# Patient Record
Sex: FEMALE | Race: WHITE | Employment: FULL TIME | URBAN - METROPOLITAN AREA
[De-identification: names, ages, dates, MRNs, and addresses within clinical notes are randomized per-mention and may not be internally consistent; named-entity substitution may affect disease eponyms.]

---

## 2017-03-01 DIAGNOSIS — R60.9 EDEMA, UNSPECIFIED TYPE: ICD-10-CM

## 2017-03-01 DIAGNOSIS — F41.1 GAD (GENERALIZED ANXIETY DISORDER): ICD-10-CM

## 2017-03-01 DIAGNOSIS — I10 ESSENTIAL HYPERTENSION: ICD-10-CM

## 2017-03-01 RX ORDER — HYDROCHLOROTHIAZIDE 25 MG/1
TABLET ORAL
Qty: 90 TABLET | Refills: 0 | Status: SHIPPED | OUTPATIENT
Start: 2017-03-01 | End: 2017-06-06 | Stop reason: SDUPTHER

## 2017-03-01 RX ORDER — ALPRAZOLAM 1 MG/1
TABLET ORAL
Qty: 30 TABLET | Refills: 0 | Status: SHIPPED | OUTPATIENT
Start: 2017-03-01 | End: 2017-04-03 | Stop reason: SDUPTHER

## 2017-03-02 ENCOUNTER — NURSE ONLY (OUTPATIENT)
Dept: INTERNAL MEDICINE CLINIC | Age: 47
End: 2017-03-02

## 2017-03-02 ENCOUNTER — HOSPITAL ENCOUNTER (OUTPATIENT)
Dept: MAMMOGRAPHY | Age: 47
Discharge: HOME | End: 2017-03-02
Attending: FAMILY MEDICINE | Admitting: FAMILY MEDICINE

## 2017-03-02 DIAGNOSIS — E53.8 B12 DEFICIENCY: Primary | ICD-10-CM

## 2017-03-02 DIAGNOSIS — Z12.31 ENCOUNTER FOR SCREENING MAMMOGRAM FOR MALIGNANT NEOPLASM OF BREAST: ICD-10-CM

## 2017-03-02 PROCEDURE — 96372 THER/PROPH/DIAG INJ SC/IM: CPT | Performed by: FAMILY MEDICINE

## 2017-03-02 RX ORDER — CYANOCOBALAMIN 1000 UG/ML
1000 INJECTION INTRAMUSCULAR; SUBCUTANEOUS ONCE
Status: COMPLETED | OUTPATIENT
Start: 2017-03-02 | End: 2017-03-02

## 2017-03-02 RX ADMIN — CYANOCOBALAMIN 1000 MCG: 1000 INJECTION INTRAMUSCULAR; SUBCUTANEOUS at 12:41

## 2017-03-06 ENCOUNTER — TELEPHONE (OUTPATIENT)
Dept: INTERNAL MEDICINE CLINIC | Age: 47
End: 2017-03-06

## 2017-03-06 DIAGNOSIS — F41.1 GAD (GENERALIZED ANXIETY DISORDER): ICD-10-CM

## 2017-03-15 RX ORDER — SERTRALINE HYDROCHLORIDE 25 MG/1
TABLET, FILM COATED ORAL
Qty: 90 TABLET | Refills: 0 | Status: SHIPPED | OUTPATIENT
Start: 2017-03-15 | End: 2017-04-12 | Stop reason: SDUPTHER

## 2017-04-03 DIAGNOSIS — F41.1 GAD (GENERALIZED ANXIETY DISORDER): ICD-10-CM

## 2017-04-03 RX ORDER — ALPRAZOLAM 1 MG/1
TABLET ORAL
Qty: 30 TABLET | Refills: 0 | Status: SHIPPED | OUTPATIENT
Start: 2017-04-03 | End: 2017-04-24 | Stop reason: SDUPTHER

## 2017-04-06 ENCOUNTER — OFFICE VISIT (OUTPATIENT)
Dept: INTERNAL MEDICINE CLINIC | Age: 47
End: 2017-04-06

## 2017-04-06 VITALS
BODY MASS INDEX: 40.8 KG/M2 | SYSTOLIC BLOOD PRESSURE: 116 MMHG | HEIGHT: 64 IN | DIASTOLIC BLOOD PRESSURE: 80 MMHG | WEIGHT: 239 LBS | HEART RATE: 78 BPM | TEMPERATURE: 98.8 F

## 2017-04-06 DIAGNOSIS — E78.2 MIXED HYPERLIPIDEMIA: ICD-10-CM

## 2017-04-06 DIAGNOSIS — E53.8 B12 DEFICIENCY: ICD-10-CM

## 2017-04-06 DIAGNOSIS — I10 ESSENTIAL HYPERTENSION: Primary | ICD-10-CM

## 2017-04-06 DIAGNOSIS — F41.1 GAD (GENERALIZED ANXIETY DISORDER): ICD-10-CM

## 2017-04-06 PROCEDURE — 36415 COLL VENOUS BLD VENIPUNCTURE: CPT | Performed by: FAMILY MEDICINE

## 2017-04-06 PROCEDURE — 96372 THER/PROPH/DIAG INJ SC/IM: CPT | Performed by: FAMILY MEDICINE

## 2017-04-06 PROCEDURE — 99214 OFFICE O/P EST MOD 30 MIN: CPT | Performed by: FAMILY MEDICINE

## 2017-04-06 RX ORDER — CYANOCOBALAMIN 1000 UG/ML
1000 INJECTION INTRAMUSCULAR; SUBCUTANEOUS ONCE
Status: COMPLETED | OUTPATIENT
Start: 2017-04-06 | End: 2017-04-06

## 2017-04-06 RX ADMIN — CYANOCOBALAMIN 1000 MCG: 1000 INJECTION INTRAMUSCULAR; SUBCUTANEOUS at 09:56

## 2017-04-11 DIAGNOSIS — E78.2 MIXED HYPERLIPIDEMIA: Primary | ICD-10-CM

## 2017-04-11 LAB
CHOLESTEROL, TOTAL: 261 MG/DL (ref 100–199)
HDL PARTICLE NO, NMR: 37.3 UMOL/L
HDL SIZE: 9.2 NM
HDLC SERPL-MCNC: 73 MG/DL
LARGE HDL PARTICLE, NMR: 9 UMOL/L
LARGE VLDL PARTICLE, NMR: 13 NMOL/L
LDL CHOLESTEROL: 141 MG/DL (ref 0–99)
LDL PARTICLE NUMBER, NMR: 1450 NMOL/L
LDL PARTICLE SIZE: 22 NM
LDL SIZE: 22 NM
LP INSULIN RESIST SCORE: 45
SMALL LDL PARTICLE, NMR: 369 NMOL/L
SMALL LDL-P: 369 NMOL/L
TRIGL SERPL-MCNC: 233 MG/DL (ref 0–149)
VLDL SIZE: 50.1 NM

## 2017-04-11 RX ORDER — ATORVASTATIN CALCIUM 20 MG/1
20 TABLET, FILM COATED ORAL DAILY
Qty: 30 TABLET | Refills: 5 | Status: SHIPPED | OUTPATIENT
Start: 2017-04-11 | End: 2017-08-22

## 2017-04-12 RX ORDER — SERTRALINE HYDROCHLORIDE 25 MG/1
TABLET, FILM COATED ORAL
Qty: 90 TABLET | Refills: 0 | Status: SHIPPED | OUTPATIENT
Start: 2017-04-12 | End: 2017-05-15 | Stop reason: SDUPTHER

## 2017-04-24 DIAGNOSIS — F41.1 GAD (GENERALIZED ANXIETY DISORDER): ICD-10-CM

## 2017-04-25 RX ORDER — ALPRAZOLAM 1 MG/1
TABLET ORAL
Qty: 30 TABLET | Refills: 2 | Status: SHIPPED | OUTPATIENT
Start: 2017-04-25 | End: 2017-07-21 | Stop reason: SDUPTHER

## 2017-05-11 RX ORDER — FLUCONAZOLE 150 MG/1
150 TABLET ORAL ONCE
Qty: 1 TABLET | Refills: 0 | Status: SHIPPED | OUTPATIENT
Start: 2017-05-11 | End: 2017-05-11

## 2017-05-15 RX ORDER — SERTRALINE HYDROCHLORIDE 25 MG/1
TABLET, FILM COATED ORAL
Qty: 90 TABLET | Refills: 0 | Status: SHIPPED | OUTPATIENT
Start: 2017-05-15 | End: 2017-06-15 | Stop reason: SDUPTHER

## 2017-05-18 ENCOUNTER — NURSE ONLY (OUTPATIENT)
Dept: INTERNAL MEDICINE CLINIC | Age: 47
End: 2017-05-18

## 2017-05-18 DIAGNOSIS — E53.8 B12 DEFICIENCY: Primary | ICD-10-CM

## 2017-05-18 PROCEDURE — 96372 THER/PROPH/DIAG INJ SC/IM: CPT | Performed by: FAMILY MEDICINE

## 2017-05-18 RX ADMIN — CYANOCOBALAMIN 1000 MCG: 1000 INJECTION INTRAMUSCULAR; SUBCUTANEOUS at 11:46

## 2017-05-19 ENCOUNTER — PATIENT MESSAGE (OUTPATIENT)
Dept: INTERNAL MEDICINE CLINIC | Age: 47
End: 2017-05-19

## 2017-06-14 RX ORDER — METFORMIN HYDROCHLORIDE 500 MG/1
TABLET, EXTENDED RELEASE ORAL
Qty: 120 TABLET | Refills: 5 | OUTPATIENT
Start: 2017-06-14

## 2017-07-12 RX ORDER — METFORMIN HYDROCHLORIDE 500 MG/1
2000 TABLET, EXTENDED RELEASE ORAL
Qty: 120 TABLET | Refills: 1 | Status: SHIPPED | OUTPATIENT
Start: 2017-07-12 | End: 2017-09-06 | Stop reason: SDUPTHER

## 2017-07-21 DIAGNOSIS — F41.1 GAD (GENERALIZED ANXIETY DISORDER): ICD-10-CM

## 2017-07-24 RX ORDER — ALPRAZOLAM 1 MG/1
TABLET ORAL
Qty: 30 TABLET | Refills: 0 | Status: SHIPPED | OUTPATIENT
Start: 2017-07-24 | End: 2017-08-22 | Stop reason: SDUPTHER

## 2017-08-15 ENCOUNTER — OFFICE VISIT (OUTPATIENT)
Dept: INTERNAL MEDICINE CLINIC | Age: 47
End: 2017-08-15

## 2017-08-15 VITALS
DIASTOLIC BLOOD PRESSURE: 78 MMHG | WEIGHT: 238.4 LBS | SYSTOLIC BLOOD PRESSURE: 122 MMHG | HEART RATE: 79 BPM | BODY MASS INDEX: 42.24 KG/M2 | TEMPERATURE: 98.1 F | HEIGHT: 63 IN

## 2017-08-15 DIAGNOSIS — E78.2 MIXED HYPERLIPIDEMIA: ICD-10-CM

## 2017-08-15 DIAGNOSIS — E53.8 B12 DEFICIENCY: ICD-10-CM

## 2017-08-15 DIAGNOSIS — Z23 NEED FOR TDAP VACCINATION: ICD-10-CM

## 2017-08-15 DIAGNOSIS — F41.1 GAD (GENERALIZED ANXIETY DISORDER): ICD-10-CM

## 2017-08-15 DIAGNOSIS — E55.9 VITAMIN D DEFICIENCY: ICD-10-CM

## 2017-08-15 DIAGNOSIS — I10 ESSENTIAL HYPERTENSION: ICD-10-CM

## 2017-08-15 DIAGNOSIS — Z00.00 ROUTINE GENERAL MEDICAL EXAMINATION AT A HEALTH CARE FACILITY: Primary | ICD-10-CM

## 2017-08-15 DIAGNOSIS — R53.83 FATIGUE, UNSPECIFIED TYPE: ICD-10-CM

## 2017-08-15 LAB
A/G RATIO: 1.5 (ref 1.1–2.2)
ALBUMIN SERPL-MCNC: 4.2 G/DL (ref 3.4–5)
ALP BLD-CCNC: 94 U/L (ref 40–129)
ALT SERPL-CCNC: 16 U/L (ref 10–40)
ANION GAP SERPL CALCULATED.3IONS-SCNC: 19 MMOL/L (ref 3–16)
AST SERPL-CCNC: 21 U/L (ref 15–37)
BASOPHILS ABSOLUTE: 0.1 K/UL (ref 0–0.2)
BASOPHILS RELATIVE PERCENT: 0.7 %
BILIRUB SERPL-MCNC: 0.3 MG/DL (ref 0–1)
BUN BLDV-MCNC: 15 MG/DL (ref 7–20)
CALCIUM SERPL-MCNC: 9.6 MG/DL (ref 8.3–10.6)
CHLORIDE BLD-SCNC: 99 MMOL/L (ref 99–110)
CO2: 24 MMOL/L (ref 21–32)
CREAT SERPL-MCNC: <0.5 MG/DL (ref 0.6–1.1)
CREATININE URINE: 78.5 MG/DL (ref 28–259)
EOSINOPHILS ABSOLUTE: 0.1 K/UL (ref 0–0.6)
EOSINOPHILS RELATIVE PERCENT: 1.9 %
FOLATE: 8.48 NG/ML (ref 4.78–24.2)
GFR AFRICAN AMERICAN: >60
GFR NON-AFRICAN AMERICAN: >60
GLOBULIN: 2.8 G/DL
GLUCOSE BLD-MCNC: 185 MG/DL (ref 70–99)
HCT VFR BLD CALC: 42.8 % (ref 36–48)
HEMOGLOBIN: 14 G/DL (ref 12–16)
LYMPHOCYTES ABSOLUTE: 2.4 K/UL (ref 1–5.1)
LYMPHOCYTES RELATIVE PERCENT: 31.1 %
MCH RBC QN AUTO: 28.8 PG (ref 26–34)
MCHC RBC AUTO-ENTMCNC: 32.7 G/DL (ref 31–36)
MCV RBC AUTO: 88 FL (ref 80–100)
MICROALBUMIN UR-MCNC: <1.2 MG/DL
MICROALBUMIN/CREAT UR-RTO: NORMAL MG/G (ref 0–30)
MONOCYTES ABSOLUTE: 0.5 K/UL (ref 0–1.3)
MONOCYTES RELATIVE PERCENT: 6.8 %
NEUTROPHILS ABSOLUTE: 4.5 K/UL (ref 1.7–7.7)
NEUTROPHILS RELATIVE PERCENT: 59.5 %
PDW BLD-RTO: 14.1 % (ref 12.4–15.4)
PLATELET # BLD: 320 K/UL (ref 135–450)
PMV BLD AUTO: 7.9 FL (ref 5–10.5)
POTASSIUM SERPL-SCNC: 4.3 MMOL/L (ref 3.5–5.1)
RBC # BLD: 4.86 M/UL (ref 4–5.2)
SODIUM BLD-SCNC: 142 MMOL/L (ref 136–145)
TOTAL PROTEIN: 7 G/DL (ref 6.4–8.2)
TSH REFLEX: 3.37 UIU/ML (ref 0.27–4.2)
VITAMIN B-12: 491 PG/ML (ref 211–911)
VITAMIN D 25-HYDROXY: 30 NG/ML
WBC # BLD: 7.6 K/UL (ref 4–11)

## 2017-08-15 PROCEDURE — 36415 COLL VENOUS BLD VENIPUNCTURE: CPT | Performed by: FAMILY MEDICINE

## 2017-08-15 PROCEDURE — 96372 THER/PROPH/DIAG INJ SC/IM: CPT | Performed by: FAMILY MEDICINE

## 2017-08-15 PROCEDURE — 99396 PREV VISIT EST AGE 40-64: CPT | Performed by: FAMILY MEDICINE

## 2017-08-15 PROCEDURE — 99214 OFFICE O/P EST MOD 30 MIN: CPT | Performed by: FAMILY MEDICINE

## 2017-08-15 PROCEDURE — 90715 TDAP VACCINE 7 YRS/> IM: CPT | Performed by: FAMILY MEDICINE

## 2017-08-15 PROCEDURE — 90471 IMMUNIZATION ADMIN: CPT | Performed by: FAMILY MEDICINE

## 2017-08-15 RX ORDER — CYANOCOBALAMIN 1000 UG/ML
1000 INJECTION INTRAMUSCULAR; SUBCUTANEOUS ONCE
Status: COMPLETED | OUTPATIENT
Start: 2017-08-15 | End: 2017-08-15

## 2017-08-15 RX ADMIN — CYANOCOBALAMIN 1000 MCG: 1000 INJECTION INTRAMUSCULAR; SUBCUTANEOUS at 09:43

## 2017-08-16 LAB
ESTIMATED AVERAGE GLUCOSE: 203 MG/DL
HBA1C MFR BLD: 8.7 %

## 2017-08-18 LAB
CHOLESTEROL, TOTAL: 263 MG/DL (ref 100–199)
HDL PARTICLE NO, NMR: 40.5 UMOL/L
HDL SIZE: 9.3 NM
HDLC SERPL-MCNC: 73 MG/DL
LARGE HDL PARTICLE, NMR: 8.4 UMOL/L
LARGE VLDL PARTICLE, NMR: 9.5 NMOL/L
LDL CHOLESTEROL: 150 MG/DL (ref 0–99)
LDL PARTICLE NUMBER, NMR: 1880 NMOL/L
LDL PARTICLE SIZE: 21.9 NM
LDL SIZE: 21.9 NM
LP INSULIN RESIST SCORE: 51
SMALL LDL PARTICLE, NMR: 472 NMOL/L
SMALL LDL-P: 472 NMOL/L
TRIGL SERPL-MCNC: 201 MG/DL (ref 0–149)
VLDL SIZE: 54.8 NM

## 2017-08-22 DIAGNOSIS — F41.1 GAD (GENERALIZED ANXIETY DISORDER): ICD-10-CM

## 2017-08-22 RX ORDER — ROSUVASTATIN CALCIUM 10 MG/1
10 TABLET, COATED ORAL DAILY
Qty: 30 TABLET | Refills: 1 | Status: SHIPPED | OUTPATIENT
Start: 2017-08-22 | End: 2017-11-11 | Stop reason: SDUPTHER

## 2017-08-22 RX ORDER — ALPRAZOLAM 1 MG/1
TABLET ORAL
Qty: 30 TABLET | Refills: 2 | Status: SHIPPED | OUTPATIENT
Start: 2017-08-22 | End: 2017-11-14 | Stop reason: SDUPTHER

## 2017-09-06 RX ORDER — METFORMIN HYDROCHLORIDE 500 MG/1
TABLET, EXTENDED RELEASE ORAL
Qty: 120 TABLET | Refills: 1 | Status: SHIPPED | OUTPATIENT
Start: 2017-09-06 | End: 2017-10-30 | Stop reason: SDUPTHER

## 2017-10-30 RX ORDER — METFORMIN HYDROCHLORIDE 500 MG/1
TABLET, EXTENDED RELEASE ORAL
Qty: 120 TABLET | Refills: 0 | Status: SHIPPED | OUTPATIENT
Start: 2017-10-30 | End: 2017-11-29 | Stop reason: SDUPTHER

## 2017-10-30 NOTE — TELEPHONE ENCOUNTER
Refill request for metformin medication.      Name of Pharmacy- [facility]    Last visit - 8-15-17     Pending visit - 10-31-17    Last refill -9-6-17    Medication Contract signed -   Last Desiree contreras-     Additional Comments

## 2017-11-07 NOTE — TELEPHONE ENCOUNTER
Refill request for zoloft medication.      Name of Pharmacy- [facility]    Last visit - 10-31-17     Pending visit - 11-9-17    Last refill -8-7-17    Medication Contract signed -   Last Rafael contreras-     Additional Comments

## 2017-11-10 DIAGNOSIS — F41.1 GAD (GENERALIZED ANXIETY DISORDER): ICD-10-CM

## 2017-11-10 RX ORDER — ALPRAZOLAM 1 MG/1
TABLET ORAL
Qty: 30 TABLET | Refills: 2 | Status: CANCELLED | OUTPATIENT
Start: 2017-11-10

## 2017-11-14 DIAGNOSIS — F41.1 GAD (GENERALIZED ANXIETY DISORDER): ICD-10-CM

## 2017-11-14 RX ORDER — SERTRALINE HYDROCHLORIDE 25 MG/1
TABLET, FILM COATED ORAL
Qty: 90 TABLET | Refills: 5 | Status: SHIPPED | OUTPATIENT
Start: 2017-11-14 | End: 2018-06-27 | Stop reason: SDUPTHER

## 2017-11-14 RX ORDER — ALPRAZOLAM 1 MG/1
TABLET ORAL
Qty: 30 TABLET | Refills: 2 | Status: SHIPPED | OUTPATIENT
Start: 2017-11-14 | End: 2018-02-03 | Stop reason: SDUPTHER

## 2017-11-14 NOTE — TELEPHONE ENCOUNTER
Please notify pt: Prescription sent electronically to pharmacy. Controlled Substances Monitoring:     Attestation: The Prescription Monitoring Report for this patient was reviewed today. (Jennifer Jaime MD)  Documentation: No signs of potential drug abuse or diversion identified., Existing medication contract.  (Jennifer Jaime MD)

## 2017-11-27 DIAGNOSIS — I10 ESSENTIAL HYPERTENSION: ICD-10-CM

## 2017-11-27 DIAGNOSIS — R60.9 EDEMA, UNSPECIFIED TYPE: ICD-10-CM

## 2017-11-27 RX ORDER — HYDROCHLOROTHIAZIDE 25 MG/1
TABLET ORAL
Qty: 90 TABLET | Refills: 0 | Status: SHIPPED | OUTPATIENT
Start: 2017-11-27 | End: 2018-01-18 | Stop reason: SDUPTHER

## 2017-11-27 NOTE — TELEPHONE ENCOUNTER
Refill request for hctz medication.      Name of Pharmacy- [facility]    Last visit - 8-15-17     Pending visit - 1-4-18    Last refill -6-6-17    Medication Contract signed -   Liborio contreras-     Additional Comments

## 2017-11-29 RX ORDER — METFORMIN HYDROCHLORIDE 500 MG/1
TABLET, EXTENDED RELEASE ORAL
Qty: 120 TABLET | Refills: 0 | Status: SHIPPED | OUTPATIENT
Start: 2017-11-29 | End: 2017-12-26 | Stop reason: SDUPTHER

## 2017-11-29 NOTE — TELEPHONE ENCOUNTER
Refill request for metformin medication.      Name of Pharmacy- [facility]    Last visit - [phone]     Pending visit - 1-4-18    Last refill -10-30-17    Medication Contract signed -   Liborio contreras-     Additional Comments

## 2018-01-02 NOTE — TELEPHONE ENCOUNTER
From: Radha Nolasco  Sent: 1/2/2018 11:24 AM EST  Subject: Medication Renewal Request    Radha Gaurav would like a refill of the following medications:  insulin detemir (LEVEMIR FLEXTOUCH) 100 UNIT/ML injection pen [DOROTHEA Jolley MD]    Preferred pharmacy: [facility]/PHARMACY [address], [address]    Comment:  Out of levimer. I take 30 units a day.

## 2018-01-04 ENCOUNTER — OFFICE VISIT (OUTPATIENT)
Dept: INTERNAL MEDICINE CLINIC | Age: 48
End: 2018-01-04

## 2018-01-04 VITALS
WEIGHT: 241.4 LBS | HEART RATE: 81 BPM | BODY MASS INDEX: 42.77 KG/M2 | HEIGHT: 63 IN | RESPIRATION RATE: 18 BRPM | OXYGEN SATURATION: 98 % | DIASTOLIC BLOOD PRESSURE: 80 MMHG | TEMPERATURE: 98 F | SYSTOLIC BLOOD PRESSURE: 126 MMHG

## 2018-01-04 DIAGNOSIS — E53.8 B12 DEFICIENCY: ICD-10-CM

## 2018-01-04 DIAGNOSIS — F41.1 GAD (GENERALIZED ANXIETY DISORDER): Primary | ICD-10-CM

## 2018-01-04 PROCEDURE — 96372 THER/PROPH/DIAG INJ SC/IM: CPT | Performed by: FAMILY MEDICINE

## 2018-01-04 PROCEDURE — 99213 OFFICE O/P EST LOW 20 MIN: CPT | Performed by: FAMILY MEDICINE

## 2018-01-04 RX ORDER — CYANOCOBALAMIN 1000 UG/ML
1000 INJECTION INTRAMUSCULAR; SUBCUTANEOUS ONCE
Status: COMPLETED | OUTPATIENT
Start: 2018-01-04 | End: 2018-01-04

## 2018-01-04 RX ADMIN — CYANOCOBALAMIN 1000 MCG: 1000 INJECTION INTRAMUSCULAR; SUBCUTANEOUS at 09:33

## 2018-01-04 NOTE — PATIENT INSTRUCTIONS
Continue current medicines. Return in about 12 weeks (around 3/29/2018) for Fasting recheck DM, BP, lipids, anxiety, CSM.

## 2018-01-04 NOTE — PROGRESS NOTES
Subjective:      Patient ID: Nereyda Calixto is a 50 y.o. female. HPI  Mood Disorder:  Patient presents for follow-up of anxiety disorder. Current complaints include: none. She denies anhedonia, depressed mood, tearfulness, feelings of hopelessness, feelings of worthlessness/excessive guilt, insomnia, irritability, excessive worry, restlessness, panic attacks, increased use of drugs or alcohol and suicidal thoughts or behavior. Symptoms/signs of roshan: none. External stressors: nothing new. Current treatment includes: Zoloft- 75 mg qd and benzodiazepine- Xanax 1 mg qd. Medication side effects: none. B12 Deficiency  Stable. C/O fatigue, but due for her monthly injection. Requests B12 shot today. Review of Systems  Review of Systems - General ROS: positive for  - fatigue  negative for - chills, fever, night sweats, sleep disturbance, weight gain or weight loss  Psychological ROS: negative, anxiety under good control  ENT ROS: negative  Respiratory ROS: no cough, shortness of breath, or wheezing  Cardiovascular ROS: no chest pain or dyspnea on exertion  Gastrointestinal ROS: no abdominal pain, change in bowel habits, or black or bloody stools  Genito-Urinary ROS: no dysuria, trouble voiding, or hematuria  Musculoskeletal ROS: negative  Neurological ROS: no TIA or stroke symptoms  Dermatological ROS: negative      Objective:   Physical Exam  Nursing note and vitals reviewed. Vitals:    01/04/18 0853   BP: 126/80   Pulse: 81   Resp: 18   Temp: 98 °F (36.7 °C)   TempSrc: Oral   SpO2: 98%   Weight: 241 lb 6.4 oz (109.5 kg)   Height: 5' 3\" (1.6 m)     Wt Readings from Last 3 Encounters:   01/04/18 241 lb 6.4 oz (109.5 kg)   08/15/17 238 lb 6.4 oz (108.1 kg)   04/06/17 239 lb (108.4 kg)     BP Readings from Last 3 Encounters:   01/04/18 126/80   08/15/17 122/78   04/06/17 116/80     Body mass index is 42.76 kg/m². Constitutional: Patient appears well-developed and well-nourished. No distress.    Head:

## 2018-01-18 DIAGNOSIS — I10 ESSENTIAL HYPERTENSION: ICD-10-CM

## 2018-01-18 DIAGNOSIS — R60.9 EDEMA, UNSPECIFIED TYPE: ICD-10-CM

## 2018-01-18 RX ORDER — HYDROCHLOROTHIAZIDE 25 MG/1
25 TABLET ORAL DAILY
Qty: 90 TABLET | Refills: 1 | Status: SHIPPED | OUTPATIENT
Start: 2018-01-18 | End: 2018-07-10 | Stop reason: SDUPTHER

## 2018-01-18 RX ORDER — METFORMIN HYDROCHLORIDE 500 MG/1
1000 TABLET, EXTENDED RELEASE ORAL
Qty: 120 TABLET | Refills: 1 | Status: SHIPPED | OUTPATIENT
Start: 2018-01-18 | End: 2018-03-05 | Stop reason: SDUPTHER

## 2018-01-18 RX ORDER — METFORMIN HYDROCHLORIDE 500 MG/1
TABLET, EXTENDED RELEASE ORAL
Qty: 120 TABLET | Refills: 5 | OUTPATIENT
Start: 2018-01-18

## 2018-01-18 NOTE — TELEPHONE ENCOUNTER
From: Cem Marquis  Sent: 1/18/2018 7:54 AM EST  Subject: Medication Renewal Request    Cem Marquis would like a refill of the following medications:  hydrochlorothiazide (HYDRODIURIL) 25 MG tablet Nieves Cordon MD]    Preferred pharmacy: [facility]/PHARMACY [facility], [address] - [address] [phone]    Comment:      Medication renewals requested in this message routed separately:  metFORMIN (GLUCOPHAGE-XR) 500 MG extended release tablet [DOROTHEA SERRANO MD]

## 2018-01-25 NOTE — TELEPHONE ENCOUNTER
From: Fabby Tse  Sent: 1/25/2018 10:58 AM EST  Subject: Medication Renewal Request    Fabby Tse would like a refill of the following medications:  insulin detemir (LEVEMIR FLEXTOUCH) 100 UNIT/ML injection pen Main Simpson CNP]    Preferred pharmacy: [facility]/PHARMACY [id number], [address] [facility]    Comment:  Dr Milagro Sanabria has changed prescription to 30mg per day, so I am running low. Thank you.

## 2018-01-26 ENCOUNTER — E-VISIT (OUTPATIENT)
Dept: INTERNAL MEDICINE CLINIC | Age: 48
End: 2018-01-26

## 2018-01-26 PROCEDURE — 99444 PR PHYSICIAN ONLINE EVALUATION & MANAGEMENT SERVICE: CPT | Performed by: FAMILY MEDICINE

## 2018-01-26 RX ORDER — FLUCONAZOLE 150 MG/1
150 TABLET ORAL ONCE
Qty: 1 TABLET | Refills: 0 | Status: SHIPPED | OUTPATIENT
Start: 2018-01-26 | End: 2018-01-26

## 2018-02-03 DIAGNOSIS — F41.1 GAD (GENERALIZED ANXIETY DISORDER): ICD-10-CM

## 2018-02-06 RX ORDER — ALPRAZOLAM 1 MG/1
.5-1 TABLET ORAL NIGHTLY PRN
Qty: 30 TABLET | Refills: 2 | Status: SHIPPED | OUTPATIENT
Start: 2018-02-08 | End: 2018-04-26 | Stop reason: SDUPTHER

## 2018-02-06 NOTE — TELEPHONE ENCOUNTER
Prescription sent electronically to pharmacy. Controlled Substances Monitoring:     Attestation: The Prescription Monitoring Report for this patient was reviewed today. (Julia Collado MD)  Documentation: No signs of potential drug abuse or diversion identified. (Julia Collado MD)  Medication Contracts: Existing medication contract.  (Julia Collado MD)

## 2018-03-01 ENCOUNTER — NURSE ONLY (OUTPATIENT)
Dept: INTERNAL MEDICINE CLINIC | Age: 48
End: 2018-03-01

## 2018-03-01 DIAGNOSIS — E53.8 B12 DEFICIENCY: Primary | ICD-10-CM

## 2018-03-01 DIAGNOSIS — R53.83 FATIGUE, UNSPECIFIED TYPE: ICD-10-CM

## 2018-03-01 PROCEDURE — 96372 THER/PROPH/DIAG INJ SC/IM: CPT | Performed by: INTERNAL MEDICINE

## 2018-03-01 RX ORDER — CYANOCOBALAMIN 1000 UG/ML
1000 INJECTION INTRAMUSCULAR; SUBCUTANEOUS ONCE
Qty: 1 ML | Refills: 0 | Status: SHIPPED | OUTPATIENT
Start: 2018-03-01 | End: 2018-03-01 | Stop reason: SDUPTHER

## 2018-03-01 RX ORDER — CYANOCOBALAMIN 1000 UG/ML
1000 INJECTION INTRAMUSCULAR; SUBCUTANEOUS ONCE
Status: COMPLETED | OUTPATIENT
Start: 2018-03-01 | End: 2018-03-01

## 2018-03-01 RX ADMIN — CYANOCOBALAMIN 1000 MCG: 1000 INJECTION INTRAMUSCULAR; SUBCUTANEOUS at 13:56

## 2018-03-05 RX ORDER — METFORMIN HYDROCHLORIDE 500 MG/1
1000 TABLET, EXTENDED RELEASE ORAL
Qty: 120 TABLET | Refills: 0 | Status: SHIPPED | OUTPATIENT
Start: 2018-03-05 | End: 2018-03-08 | Stop reason: SDUPTHER

## 2018-03-05 NOTE — TELEPHONE ENCOUNTER
Refill request for test strip / metformin  medication.      Name of Pharmacy- [facility]     Last visit - 1-4-18     Pending visit - 4-10-18    Last refill -5-/ 1-18-18    Medication Contract signed -   Last [employer] contreras-     Additional Comments

## 2018-03-07 ENCOUNTER — TELEPHONE (OUTPATIENT)
Dept: INTERNAL MEDICINE CLINIC | Age: 48
End: 2018-03-07

## 2018-03-08 RX ORDER — METFORMIN HYDROCHLORIDE 500 MG/1
2000 TABLET, EXTENDED RELEASE ORAL
Qty: 120 TABLET | Refills: 5 | Status: SHIPPED | OUTPATIENT
Start: 2018-03-08 | End: 2018-08-19 | Stop reason: SDUPTHER

## 2018-03-26 ENCOUNTER — HOSPITAL ENCOUNTER (OUTPATIENT)
Dept: MAMMOGRAPHY | Age: 48
Discharge: HOME | End: 2018-03-26
Attending: FAMILY MEDICINE | Admitting: FAMILY MEDICINE

## 2018-03-26 DIAGNOSIS — Z12.31 ENCOUNTER FOR SCREENING MAMMOGRAM FOR BREAST CANCER: ICD-10-CM

## 2018-04-26 ENCOUNTER — OFFICE VISIT (OUTPATIENT)
Dept: INTERNAL MEDICINE CLINIC | Age: 48
End: 2018-04-26

## 2018-04-26 VITALS
OXYGEN SATURATION: 98 % | BODY MASS INDEX: 43.52 KG/M2 | HEART RATE: 78 BPM | DIASTOLIC BLOOD PRESSURE: 84 MMHG | HEIGHT: 63 IN | WEIGHT: 245.6 LBS | SYSTOLIC BLOOD PRESSURE: 136 MMHG | TEMPERATURE: 98 F

## 2018-04-26 DIAGNOSIS — E78.2 MIXED HYPERLIPIDEMIA: ICD-10-CM

## 2018-04-26 DIAGNOSIS — K80.12 CALCULUS OF GALLBLADDER WITH ACUTE ON CHRONIC CHOLECYSTITIS WITHOUT OBSTRUCTION: ICD-10-CM

## 2018-04-26 DIAGNOSIS — R10.11 RUQ PAIN: ICD-10-CM

## 2018-04-26 DIAGNOSIS — F41.1 GAD (GENERALIZED ANXIETY DISORDER): ICD-10-CM

## 2018-04-26 DIAGNOSIS — I10 ESSENTIAL HYPERTENSION: ICD-10-CM

## 2018-04-26 DIAGNOSIS — R19.7 DIARRHEA, UNSPECIFIED TYPE: ICD-10-CM

## 2018-04-26 DIAGNOSIS — E53.8 B12 DEFICIENCY: ICD-10-CM

## 2018-04-26 PROCEDURE — 99214 OFFICE O/P EST MOD 30 MIN: CPT | Performed by: FAMILY MEDICINE

## 2018-04-26 PROCEDURE — 96372 THER/PROPH/DIAG INJ SC/IM: CPT | Performed by: FAMILY MEDICINE

## 2018-04-26 PROCEDURE — 36415 COLL VENOUS BLD VENIPUNCTURE: CPT | Performed by: FAMILY MEDICINE

## 2018-04-26 RX ORDER — ALPRAZOLAM 1 MG/1
.5-1 TABLET ORAL NIGHTLY PRN
Qty: 30 TABLET | Refills: 2 | Status: SHIPPED | OUTPATIENT
Start: 2018-04-26 | End: 2018-08-08 | Stop reason: SDUPTHER

## 2018-04-26 RX ORDER — CYANOCOBALAMIN 1000 UG/ML
1000 INJECTION INTRAMUSCULAR; SUBCUTANEOUS ONCE
Status: COMPLETED | OUTPATIENT
Start: 2018-04-26 | End: 2018-04-26

## 2018-04-26 RX ADMIN — CYANOCOBALAMIN 1000 MCG: 1000 INJECTION INTRAMUSCULAR; SUBCUTANEOUS at 09:43

## 2018-04-27 LAB
A/G RATIO: 1.8 (ref 1.1–2.2)
ALBUMIN SERPL-MCNC: 5 G/DL (ref 3.4–5)
ALP BLD-CCNC: 97 U/L (ref 40–129)
ALT SERPL-CCNC: 14 U/L (ref 10–40)
ANION GAP SERPL CALCULATED.3IONS-SCNC: 21 MMOL/L (ref 3–16)
AST SERPL-CCNC: 20 U/L (ref 15–37)
BILIRUB SERPL-MCNC: 0.3 MG/DL (ref 0–1)
BUN BLDV-MCNC: 13 MG/DL (ref 7–20)
CALCIUM SERPL-MCNC: 10.5 MG/DL (ref 8.3–10.6)
CHLORIDE BLD-SCNC: 96 MMOL/L (ref 99–110)
CHOLESTEROL, TOTAL: 257 MG/DL (ref 0–199)
CO2: 23 MMOL/L (ref 21–32)
CREAT SERPL-MCNC: 0.5 MG/DL (ref 0.6–1.1)
ESTIMATED AVERAGE GLUCOSE: 200.1 MG/DL
GFR AFRICAN AMERICAN: >60
GFR NON-AFRICAN AMERICAN: >60
GLOBULIN: 2.8 G/DL
GLUCOSE BLD-MCNC: 212 MG/DL (ref 70–99)
HBA1C MFR BLD: 8.6 %
HDLC SERPL-MCNC: 65 MG/DL (ref 40–60)
LDL CHOLESTEROL CALCULATED: 146 MG/DL
POTASSIUM SERPL-SCNC: 4.3 MMOL/L (ref 3.5–5.1)
SODIUM BLD-SCNC: 140 MMOL/L (ref 136–145)
TOTAL PROTEIN: 7.8 G/DL (ref 6.4–8.2)
TRIGL SERPL-MCNC: 229 MG/DL (ref 0–150)
VLDLC SERPL CALC-MCNC: 46 MG/DL

## 2018-05-02 ENCOUNTER — HOSPITAL ENCOUNTER (OUTPATIENT)
Dept: ULTRASOUND IMAGING | Age: 48
Discharge: HOME | End: 2018-05-02
Attending: FAMILY MEDICINE | Admitting: FAMILY MEDICINE

## 2018-05-02 DIAGNOSIS — R10.11 RUQ PAIN: ICD-10-CM

## 2018-05-02 DIAGNOSIS — K80.12 CALCULUS OF GALLBLADDER WITH ACUTE ON CHRONIC CHOLECYSTITIS WITHOUT OBSTRUCTION: ICD-10-CM

## 2018-05-03 RX ORDER — CIPROFLOXACIN 500 MG/1
500 TABLET, FILM COATED ORAL 2 TIMES DAILY
Qty: 6 TABLET | Refills: 0 | Status: SHIPPED | OUTPATIENT
Start: 2018-05-03 | End: 2018-05-06

## 2018-06-27 RX ORDER — SERTRALINE HYDROCHLORIDE 25 MG/1
TABLET, FILM COATED ORAL
Qty: 90 TABLET | Refills: 5 | Status: SHIPPED | OUTPATIENT
Start: 2018-06-27 | End: 2019-03-11 | Stop reason: SDUPTHER

## 2018-07-10 DIAGNOSIS — R60.9 EDEMA, UNSPECIFIED TYPE: ICD-10-CM

## 2018-07-10 DIAGNOSIS — I10 ESSENTIAL HYPERTENSION: ICD-10-CM

## 2018-07-10 RX ORDER — HYDROCHLOROTHIAZIDE 25 MG/1
25 TABLET ORAL DAILY
Qty: 90 TABLET | Refills: 1 | Status: SHIPPED | OUTPATIENT
Start: 2018-07-10 | End: 2019-01-04 | Stop reason: SDUPTHER

## 2018-07-10 NOTE — TELEPHONE ENCOUNTER
Refill request for hctz medication.      Name of Pharmacy- [facility]    Last visit - 4-26-18     Pending visit - 7-17-18    Last refill -1-18-18    Medication Contract signed    Last Manuel contreras-     Additional Comments

## 2018-08-08 DIAGNOSIS — F41.1 GAD (GENERALIZED ANXIETY DISORDER): ICD-10-CM

## 2018-08-09 ENCOUNTER — TELEPHONE (OUTPATIENT)
Dept: INTERNAL MEDICINE CLINIC | Age: 48
End: 2018-08-09

## 2018-08-09 RX ORDER — ALPRAZOLAM 1 MG/1
1 TABLET ORAL NIGHTLY PRN
Qty: 30 TABLET | Refills: 2 | Status: SHIPPED | OUTPATIENT
Start: 2018-08-09 | End: 2018-11-09 | Stop reason: SDUPTHER

## 2018-08-09 NOTE — TELEPHONE ENCOUNTER
Prescription sent electronically to pharmacy. Controlled Substances Monitoring:     RX Monitoring 8/9/2018   Attestation The Prescription Monitoring Report for this patient was reviewed today. Documentation No signs of potential drug abuse or diversion identified. Medication Contracts Existing medication contract.

## 2018-08-09 NOTE — TELEPHONE ENCOUNTER
Refill request for xanax medication.      Name of Pharmacy- [facility]    Last visit - 7/17/18     Pending visit - none    Last refill - 4/26/18    Medication Contract signed - 6/18/15  Last Oarrs ran- 8/9/18

## 2018-08-16 ENCOUNTER — OFFICE VISIT (OUTPATIENT)
Dept: INTERNAL MEDICINE CLINIC | Age: 48
End: 2018-08-16

## 2018-08-16 VITALS
HEART RATE: 85 BPM | SYSTOLIC BLOOD PRESSURE: 118 MMHG | WEIGHT: 232 LBS | DIASTOLIC BLOOD PRESSURE: 64 MMHG | BODY MASS INDEX: 39.61 KG/M2 | OXYGEN SATURATION: 96 % | HEIGHT: 64 IN

## 2018-08-16 DIAGNOSIS — F41.1 GAD (GENERALIZED ANXIETY DISORDER): Primary | ICD-10-CM

## 2018-08-16 DIAGNOSIS — E53.8 B12 DEFICIENCY: ICD-10-CM

## 2018-08-16 PROCEDURE — 99213 OFFICE O/P EST LOW 20 MIN: CPT | Performed by: FAMILY MEDICINE

## 2018-08-16 PROCEDURE — 96372 THER/PROPH/DIAG INJ SC/IM: CPT | Performed by: FAMILY MEDICINE

## 2018-08-16 RX ORDER — CYANOCOBALAMIN 1000 UG/ML
1000 INJECTION INTRAMUSCULAR; SUBCUTANEOUS ONCE
Status: COMPLETED | OUTPATIENT
Start: 2018-08-16 | End: 2018-08-16

## 2018-08-16 RX ADMIN — CYANOCOBALAMIN 1000 MCG: 1000 INJECTION INTRAMUSCULAR; SUBCUTANEOUS at 11:35

## 2018-08-16 ASSESSMENT — ENCOUNTER SYMPTOMS
DIARRHEA: 0
CHEST TIGHTNESS: 0
ABDOMINAL PAIN: 0
BACK PAIN: 0
COUGH: 0
EYE REDNESS: 0
EYE DISCHARGE: 0
CONSTIPATION: 0
SINUS PRESSURE: 0
WHEEZING: 0
SORE THROAT: 0
EYE PAIN: 0
SHORTNESS OF BREATH: 0
VOMITING: 0
NAUSEA: 0
RHINORRHEA: 0
TROUBLE SWALLOWING: 0

## 2018-08-16 ASSESSMENT — PATIENT HEALTH QUESTIONNAIRE - PHQ9
SUM OF ALL RESPONSES TO PHQ QUESTIONS 1-9: 0
2. FEELING DOWN, DEPRESSED OR HOPELESS: 0
1. LITTLE INTEREST OR PLEASURE IN DOING THINGS: 0
SUM OF ALL RESPONSES TO PHQ QUESTIONS 1-9: 0
SUM OF ALL RESPONSES TO PHQ9 QUESTIONS 1 & 2: 0

## 2018-08-16 NOTE — PROGRESS NOTES
SpO2: 96%       Physical Exam  Nursing note and vitals reviewed. Vitals:    08/16/18 1036   BP: 118/64   Site: Left Arm   Position: Sitting   Cuff Size: Medium Adult   Pulse: 85   SpO2: 96%   Weight: 232 lb (105.2 kg)   Height: 5' 4\" (1.626 m)     Wt Readings from Last 3 Encounters:   08/16/18 232 lb (105.2 kg)   04/26/18 245 lb 9.6 oz (111.4 kg)   01/04/18 241 lb 6.4 oz (109.5 kg)     BP Readings from Last 3 Encounters:   08/16/18 118/64   04/26/18 136/84   01/04/18 126/80     Body mass index is 39.82 kg/m². Constitutional: Patient appears well-developed and well-nourished. No distress. Head: Normocephalic and atraumatic. Oropharyngeal exam: mucous membranes moist, pharynx normal without lesions. Neck: Normal range of motion. Neck supple. No thyroidmegaly. Cardiovascular: Normal rate, regular rhythm, normal heart sounds and intact distal pulses. Pulmonary/Chest: Effort normal and breath sounds normal. No stridor. No respiratory distress. No wheezes and no rales. Abdominal: Soft. Bowel sounds are normal. No distension and no mass. No tenderness. No rebound and no guarding. Musculoskeletal: No edema and no tenderness. Skin: No rash or erythema. Psychiatric: Normal mood and affect. Behavior is normal.       Assessment       Diagnosis Orders   1. AMANDA (generalized anxiety disorder)     2. B12 deficiency  cyanocobalamin injection 1,000 mcg            Sarabjit      Debra Drake was seen today for anxiety and other. Diagnoses and all orders for this visit:    AMANDA (generalized anxiety disorder)  Stable, well controlled. Continue current medicines. Controlled Substances Monitoring:     RX Monitoring 8/16/2018   Attestation The Prescription Monitoring Report for this patient was reviewed today. Documentation Possible medication side effects, risk of tolerance/dependence & alternative treatments discussed. ;No signs of potential drug abuse or diversion identified.    Medication Contracts Existing medication

## 2018-08-20 RX ORDER — METFORMIN HYDROCHLORIDE 500 MG/1
2000 TABLET, EXTENDED RELEASE ORAL
Qty: 120 TABLET | Refills: 5 | Status: SHIPPED | OUTPATIENT
Start: 2018-08-20 | End: 2018-12-13 | Stop reason: SDUPTHER

## 2018-08-20 NOTE — TELEPHONE ENCOUNTER
Refill request for metformin medication.      Name of Pharmacy- [facility]    Last visit - 8/16/18     Pending visit - 1/29/19    Last refill -3/8/18  5 refills

## 2018-10-30 ENCOUNTER — APPOINTMENT (OUTPATIENT)
Dept: GENERAL RADIOLOGY | Age: 48
End: 2018-10-30
Payer: COMMERCIAL

## 2018-10-30 ENCOUNTER — HOSPITAL ENCOUNTER (EMERGENCY)
Age: 48
Discharge: HOME | End: 2018-10-31
Attending: EMERGENCY MEDICINE
Payer: COMMERCIAL

## 2018-10-30 DIAGNOSIS — R73.9 HYPERGLYCEMIA: Primary | ICD-10-CM

## 2018-10-30 DIAGNOSIS — N39.0 ACUTE UTI: ICD-10-CM

## 2018-10-30 LAB
A/G RATIO: 1.3 (ref 1.1–2.2)
ALBUMIN SERPL-MCNC: 4.3 G/DL (ref 3.4–5)
ALP BLD-CCNC: 117 U/L (ref 40–129)
ALT SERPL-CCNC: 32 U/L (ref 10–40)
ANION GAP SERPL CALCULATED.3IONS-SCNC: 15 MMOL/L (ref 3–16)
AST SERPL-CCNC: 33 U/L (ref 15–37)
BACTERIA: ABNORMAL /HPF
BASE EXCESS VENOUS: 3.6 MMOL/L (ref -3–3)
BASOPHILS ABSOLUTE: 0.1 K/UL (ref 0–0.2)
BASOPHILS RELATIVE PERCENT: 0.7 %
BILIRUB SERPL-MCNC: 0.4 MG/DL (ref 0–1)
BILIRUBIN URINE: NEGATIVE
BLOOD, URINE: ABNORMAL
BUN BLDV-MCNC: 11 MG/DL (ref 7–20)
CALCIUM SERPL-MCNC: 9.7 MG/DL (ref 8.3–10.6)
CARBOXYHEMOGLOBIN: 1.2 % (ref 0–1.5)
CHLORIDE BLD-SCNC: 90 MMOL/L (ref 99–110)
CLARITY: CLEAR
CO2: 28 MMOL/L (ref 21–32)
COLOR: YELLOW
CREAT SERPL-MCNC: 0.8 MG/DL (ref 0.6–1.1)
EOSINOPHILS ABSOLUTE: 0.1 K/UL (ref 0–0.6)
EOSINOPHILS RELATIVE PERCENT: 0.7 %
EPITHELIAL CELLS, UA: ABNORMAL /HPF
GFR AFRICAN AMERICAN: >60
GFR NON-AFRICAN AMERICAN: >60
GLOBULIN: 3.2 G/DL
GLUCOSE BLD-MCNC: 352 MG/DL (ref 70–99)
GLUCOSE BLD-MCNC: 352 MG/DL (ref 70–99)
GLUCOSE URINE: >=1000 MG/DL
HCO3 VENOUS: 28.5 MMOL/L (ref 23–29)
HCT VFR BLD CALC: 43.6 % (ref 36–48)
HEMOGLOBIN: 14.9 G/DL (ref 12–16)
KETONES, URINE: NEGATIVE MG/DL
LACTIC ACID: 2.1 MMOL/L (ref 0.4–2)
LEUKOCYTE ESTERASE, URINE: NEGATIVE
LIPASE: 32 U/L (ref 13–60)
LYMPHOCYTES ABSOLUTE: 2.2 K/UL (ref 1–5.1)
LYMPHOCYTES RELATIVE PERCENT: 20.1 %
MAGNESIUM: 1.5 MG/DL (ref 1.8–2.4)
MCH RBC QN AUTO: 29.2 PG (ref 26–34)
MCHC RBC AUTO-ENTMCNC: 34.2 G/DL (ref 31–36)
MCV RBC AUTO: 85.4 FL (ref 80–100)
METHEMOGLOBIN VENOUS: 0.4 %
MICROSCOPIC EXAMINATION: YES
MONOCYTES ABSOLUTE: 0.9 K/UL (ref 0–1.3)
MONOCYTES RELATIVE PERCENT: 8.3 %
NEUTROPHILS ABSOLUTE: 7.8 K/UL (ref 1.7–7.7)
NEUTROPHILS RELATIVE PERCENT: 70.2 %
NITRITE, URINE: POSITIVE
O2 CONTENT, VEN: 17 VOL %
O2 SAT, VEN: 78 %
O2 THERAPY: ABNORMAL
PCO2, VEN: 44 MMHG (ref 40–50)
PDW BLD-RTO: 13.1 % (ref 12.4–15.4)
PERFORMED ON: ABNORMAL
PH UA: 6.5
PH VENOUS: 7.43 (ref 7.35–7.45)
PLATELET # BLD: 354 K/UL (ref 135–450)
PMV BLD AUTO: 7.4 FL (ref 5–10.5)
PO2, VEN: 39.6 MMHG (ref 25–40)
POTASSIUM REFLEX MAGNESIUM: 4.1 MMOL/L (ref 3.5–5.1)
PROTEIN UA: NEGATIVE MG/DL
RBC # BLD: 5.11 M/UL (ref 4–5.2)
RBC UA: ABNORMAL /HPF (ref 0–2)
SODIUM BLD-SCNC: 133 MMOL/L (ref 136–145)
SPECIFIC GRAVITY UA: 1.01
TCO2 CALC VENOUS: 30 MMOL/L
TOTAL PROTEIN: 7.5 G/DL (ref 6.4–8.2)
URINE TYPE: ABNORMAL
UROBILINOGEN, URINE: 0.2 E.U./DL
WBC # BLD: 11.1 K/UL (ref 4–11)
WBC UA: ABNORMAL /HPF (ref 0–5)

## 2018-10-30 PROCEDURE — 99285 EMERGENCY DEPT VISIT HI MDM: CPT

## 2018-10-30 PROCEDURE — 93005 ELECTROCARDIOGRAM TRACING: CPT | Performed by: EMERGENCY MEDICINE

## 2018-10-30 PROCEDURE — 6370000000 HC RX 637 (ALT 250 FOR IP): Performed by: EMERGENCY MEDICINE

## 2018-10-30 PROCEDURE — 2580000003 HC RX 258: Performed by: EMERGENCY MEDICINE

## 2018-10-30 PROCEDURE — 96374 THER/PROPH/DIAG INJ IV PUSH: CPT

## 2018-10-30 PROCEDURE — 80053 COMPREHEN METABOLIC PANEL: CPT

## 2018-10-30 PROCEDURE — 6360000002 HC RX W HCPCS: Performed by: EMERGENCY MEDICINE

## 2018-10-30 PROCEDURE — 83690 ASSAY OF LIPASE: CPT

## 2018-10-30 PROCEDURE — 83735 ASSAY OF MAGNESIUM: CPT

## 2018-10-30 PROCEDURE — 96372 THER/PROPH/DIAG INJ SC/IM: CPT

## 2018-10-30 PROCEDURE — 85025 COMPLETE CBC W/AUTO DIFF WBC: CPT

## 2018-10-30 PROCEDURE — 96361 HYDRATE IV INFUSION ADD-ON: CPT

## 2018-10-30 PROCEDURE — 83605 ASSAY OF LACTIC ACID: CPT

## 2018-10-30 PROCEDURE — 82803 BLOOD GASES ANY COMBINATION: CPT

## 2018-10-30 PROCEDURE — 81001 URINALYSIS AUTO W/SCOPE: CPT

## 2018-10-30 PROCEDURE — 71045 X-RAY EXAM CHEST 1 VIEW: CPT

## 2018-10-30 RX ORDER — ONDANSETRON 2 MG/ML
4 INJECTION INTRAMUSCULAR; INTRAVENOUS ONCE
Status: COMPLETED | OUTPATIENT
Start: 2018-10-30 | End: 2018-10-30

## 2018-10-30 RX ORDER — ALPRAZOLAM 1 MG/1
1 TABLET ORAL
COMMUNITY
End: 2018-11-12

## 2018-10-30 RX ORDER — 0.9 % SODIUM CHLORIDE 0.9 %
1000 INTRAVENOUS SOLUTION INTRAVENOUS ONCE
Status: COMPLETED | OUTPATIENT
Start: 2018-10-30 | End: 2018-10-31

## 2018-10-30 RX ADMIN — SODIUM CHLORIDE 1000 ML: 9 INJECTION, SOLUTION INTRAVENOUS at 22:59

## 2018-10-30 RX ADMIN — SODIUM CHLORIDE 1000 ML: 9 INJECTION, SOLUTION INTRAVENOUS at 23:39

## 2018-10-30 RX ADMIN — ONDANSETRON 4 MG: 2 INJECTION, SOLUTION INTRAMUSCULAR; INTRAVENOUS at 23:38

## 2018-10-30 RX ADMIN — INSULIN HUMAN 7 UNITS: 100 INJECTION, SOLUTION PARENTERAL at 23:36

## 2018-10-30 ASSESSMENT — PAIN DESCRIPTION - LOCATION: LOCATION: ABDOMEN

## 2018-10-30 ASSESSMENT — PAIN DESCRIPTION - ONSET: ONSET: ON-GOING

## 2018-10-30 ASSESSMENT — PAIN DESCRIPTION - DESCRIPTORS: DESCRIPTORS: CRAMPING

## 2018-10-30 ASSESSMENT — PAIN DESCRIPTION - PAIN TYPE: TYPE: CHRONIC PAIN

## 2018-10-30 ASSESSMENT — PAIN DESCRIPTION - FREQUENCY: FREQUENCY: INTERMITTENT

## 2018-10-30 ASSESSMENT — PAIN SCALES - GENERAL: PAINLEVEL_OUTOF10: 1

## 2018-10-30 ASSESSMENT — PAIN DESCRIPTION - PROGRESSION: CLINICAL_PROGRESSION: NOT CHANGED

## 2018-10-31 ENCOUNTER — TELEPHONE (OUTPATIENT)
Dept: INTERNAL MEDICINE CLINIC | Age: 48
End: 2018-10-31

## 2018-10-31 VITALS
DIASTOLIC BLOOD PRESSURE: 92 MMHG | HEART RATE: 97 BPM | HEIGHT: 63 IN | WEIGHT: 190 LBS | TEMPERATURE: 98.5 F | BODY MASS INDEX: 33.66 KG/M2 | RESPIRATION RATE: 32 BRPM | SYSTOLIC BLOOD PRESSURE: 163 MMHG | OXYGEN SATURATION: 97 %

## 2018-10-31 LAB
EKG ATRIAL RATE: 98 BPM
EKG DIAGNOSIS: NORMAL
EKG P AXIS: 45 DEGREES
EKG P-R INTERVAL: 162 MS
EKG Q-T INTERVAL: 334 MS
EKG QRS DURATION: 78 MS
EKG QTC CALCULATION (BAZETT): 426 MS
EKG R AXIS: 4 DEGREES
EKG T AXIS: 36 DEGREES
EKG VENTRICULAR RATE: 98 BPM
GLUCOSE BLD-MCNC: 239 MG/DL (ref 70–99)
LACTIC ACID: 2.2 MMOL/L (ref 0.4–2)
PERFORMED ON: ABNORMAL

## 2018-10-31 PROCEDURE — 6370000000 HC RX 637 (ALT 250 FOR IP): Performed by: EMERGENCY MEDICINE

## 2018-10-31 PROCEDURE — 96361 HYDRATE IV INFUSION ADD-ON: CPT

## 2018-10-31 PROCEDURE — 83605 ASSAY OF LACTIC ACID: CPT

## 2018-10-31 PROCEDURE — 93010 ELECTROCARDIOGRAM REPORT: CPT | Performed by: INTERNAL MEDICINE

## 2018-10-31 RX ORDER — ACETAMINOPHEN 500 MG
1000 TABLET ORAL ONCE
Status: COMPLETED | OUTPATIENT
Start: 2018-10-31 | End: 2018-10-31

## 2018-10-31 RX ORDER — CEFUROXIME AXETIL 250 MG/1
500 TABLET ORAL 2 TIMES DAILY
Qty: 28 TABLET | Refills: 0 | Status: SHIPPED | OUTPATIENT
Start: 2018-10-31 | End: 2018-11-07

## 2018-10-31 RX ADMIN — ACETAMINOPHEN 1000 MG: 500 TABLET ORAL at 01:28

## 2018-11-09 DIAGNOSIS — F41.1 GAD (GENERALIZED ANXIETY DISORDER): ICD-10-CM

## 2018-11-12 RX ORDER — ALPRAZOLAM 1 MG/1
1 TABLET ORAL NIGHTLY PRN
Qty: 30 TABLET | Refills: 2 | Status: SHIPPED | OUTPATIENT
Start: 2018-11-12 | End: 2019-02-11 | Stop reason: SDUPTHER

## 2018-11-12 NOTE — TELEPHONE ENCOUNTER
Refill request for Alprazolam medication.      Name of Pharmacy- [facility]    Last visit - 8/16/18     Pending visit - 1/29/19    Last refill -10/30/18    Medication Contract signed -6/18/15   Last Oarrs ran- 8/16/18    Additional Comments

## 2018-11-12 NOTE — TELEPHONE ENCOUNTER
Thelast refill on this medication was 10/8. ..she had 3 refills from the original script written 8/9/18.       Patient is out and asks if this can be fill today please

## 2018-12-13 RX ORDER — METFORMIN HYDROCHLORIDE 500 MG/1
2000 TABLET, EXTENDED RELEASE ORAL
Qty: 120 TABLET | Refills: 5 | Status: CANCELLED | OUTPATIENT
Start: 2018-12-13

## 2018-12-13 RX ORDER — METFORMIN HYDROCHLORIDE 500 MG/1
2000 TABLET, EXTENDED RELEASE ORAL
Qty: 360 TABLET | Refills: 1 | Status: SHIPPED | OUTPATIENT
Start: 2018-12-13 | End: 2019-06-08 | Stop reason: SDUPTHER

## 2019-01-16 ENCOUNTER — TELEPHONE (OUTPATIENT)
Dept: ORTHOPEDIC SURGERY | Age: 49
End: 2019-01-16

## 2019-01-29 ENCOUNTER — OFFICE VISIT (OUTPATIENT)
Dept: INTERNAL MEDICINE CLINIC | Age: 49
End: 2019-01-29
Payer: COMMERCIAL

## 2019-01-29 VITALS
HEART RATE: 76 BPM | SYSTOLIC BLOOD PRESSURE: 112 MMHG | BODY MASS INDEX: 37.52 KG/M2 | DIASTOLIC BLOOD PRESSURE: 70 MMHG | WEIGHT: 219.8 LBS | OXYGEN SATURATION: 97 % | HEIGHT: 64 IN

## 2019-01-29 DIAGNOSIS — E53.8 B12 DEFICIENCY: ICD-10-CM

## 2019-01-29 DIAGNOSIS — Z23 NEED FOR SHINGLES VACCINE: ICD-10-CM

## 2019-01-29 DIAGNOSIS — E78.2 MIXED HYPERLIPIDEMIA: ICD-10-CM

## 2019-01-29 DIAGNOSIS — Z12.11 SCREENING FOR COLON CANCER: ICD-10-CM

## 2019-01-29 DIAGNOSIS — F41.1 GAD (GENERALIZED ANXIETY DISORDER): ICD-10-CM

## 2019-01-29 DIAGNOSIS — E55.9 VITAMIN D DEFICIENCY: ICD-10-CM

## 2019-01-29 DIAGNOSIS — Z00.00 ROUTINE GENERAL MEDICAL EXAMINATION AT A HEALTH CARE FACILITY: Primary | ICD-10-CM

## 2019-01-29 DIAGNOSIS — R53.83 FATIGUE, UNSPECIFIED TYPE: ICD-10-CM

## 2019-01-29 DIAGNOSIS — I10 ESSENTIAL HYPERTENSION: ICD-10-CM

## 2019-01-29 LAB
A/G RATIO: 1.6 (ref 1.1–2.2)
ALBUMIN SERPL-MCNC: 4.4 G/DL (ref 3.4–5)
ALP BLD-CCNC: 95 U/L (ref 40–129)
ALT SERPL-CCNC: 16 U/L (ref 10–40)
ANION GAP SERPL CALCULATED.3IONS-SCNC: 16 MMOL/L (ref 3–16)
AST SERPL-CCNC: 18 U/L (ref 15–37)
BASOPHILS ABSOLUTE: 0.1 K/UL (ref 0–0.2)
BASOPHILS RELATIVE PERCENT: 0.8 %
BILIRUB SERPL-MCNC: 0.3 MG/DL (ref 0–1)
BUN BLDV-MCNC: 13 MG/DL (ref 7–20)
CALCIUM SERPL-MCNC: 9.9 MG/DL (ref 8.3–10.6)
CHLORIDE BLD-SCNC: 97 MMOL/L (ref 99–110)
CHOLESTEROL, TOTAL: 279 MG/DL (ref 0–199)
CO2: 25 MMOL/L (ref 21–32)
CREAT SERPL-MCNC: 0.5 MG/DL (ref 0.6–1.1)
CREATININE URINE: 65.4 MG/DL (ref 28–259)
EOSINOPHILS ABSOLUTE: 0.1 K/UL (ref 0–0.6)
EOSINOPHILS RELATIVE PERCENT: 1.1 %
FOLATE: 16.15 NG/ML (ref 4.78–24.2)
GFR AFRICAN AMERICAN: >60
GFR NON-AFRICAN AMERICAN: >60
GLOBULIN: 2.8 G/DL
GLUCOSE BLD-MCNC: 191 MG/DL (ref 70–99)
HCT VFR BLD CALC: 44 % (ref 36–48)
HDLC SERPL-MCNC: 58 MG/DL (ref 40–60)
HEMOGLOBIN: 14.9 G/DL (ref 12–16)
LDL CHOLESTEROL CALCULATED: 175 MG/DL
LYMPHOCYTES ABSOLUTE: 2.5 K/UL (ref 1–5.1)
LYMPHOCYTES RELATIVE PERCENT: 31 %
MCH RBC QN AUTO: 29.3 PG (ref 26–34)
MCHC RBC AUTO-ENTMCNC: 33.8 G/DL (ref 31–36)
MCV RBC AUTO: 86.7 FL (ref 80–100)
MICROALBUMIN UR-MCNC: <1.2 MG/DL
MICROALBUMIN/CREAT UR-RTO: NORMAL MG/G (ref 0–30)
MONOCYTES ABSOLUTE: 0.6 K/UL (ref 0–1.3)
MONOCYTES RELATIVE PERCENT: 6.9 %
NEUTROPHILS ABSOLUTE: 4.9 K/UL (ref 1.7–7.7)
NEUTROPHILS RELATIVE PERCENT: 60.2 %
PDW BLD-RTO: 13.3 % (ref 12.4–15.4)
PLATELET # BLD: 361 K/UL (ref 135–450)
PMV BLD AUTO: 7.8 FL (ref 5–10.5)
POTASSIUM SERPL-SCNC: 4.2 MMOL/L (ref 3.5–5.1)
RBC # BLD: 5.08 M/UL (ref 4–5.2)
SODIUM BLD-SCNC: 138 MMOL/L (ref 136–145)
T4 FREE: 1.1 NG/DL (ref 0.9–1.8)
TOTAL PROTEIN: 7.2 G/DL (ref 6.4–8.2)
TRIGL SERPL-MCNC: 231 MG/DL (ref 0–150)
TSH REFLEX: 4.29 UIU/ML (ref 0.27–4.2)
VITAMIN B-12: 880 PG/ML (ref 211–911)
VITAMIN D 25-HYDROXY: 29.4 NG/ML
VLDLC SERPL CALC-MCNC: 46 MG/DL
WBC # BLD: 8.2 K/UL (ref 4–11)

## 2019-01-29 PROCEDURE — 96372 THER/PROPH/DIAG INJ SC/IM: CPT | Performed by: FAMILY MEDICINE

## 2019-01-29 PROCEDURE — 36415 COLL VENOUS BLD VENIPUNCTURE: CPT | Performed by: FAMILY MEDICINE

## 2019-01-29 PROCEDURE — 99396 PREV VISIT EST AGE 40-64: CPT | Performed by: FAMILY MEDICINE

## 2019-01-29 PROCEDURE — 99214 OFFICE O/P EST MOD 30 MIN: CPT | Performed by: FAMILY MEDICINE

## 2019-01-29 RX ORDER — CYANOCOBALAMIN 1000 UG/ML
1000 INJECTION INTRAMUSCULAR; SUBCUTANEOUS ONCE
Status: COMPLETED | OUTPATIENT
Start: 2019-01-29 | End: 2019-01-29

## 2019-01-29 RX ADMIN — CYANOCOBALAMIN 1000 MCG: 1000 INJECTION INTRAMUSCULAR; SUBCUTANEOUS at 09:39

## 2019-01-29 ASSESSMENT — ENCOUNTER SYMPTOMS
VOMITING: 0
EYE REDNESS: 0
RHINORRHEA: 0
TROUBLE SWALLOWING: 0
NAUSEA: 0
EYE PAIN: 0
DIARRHEA: 0
ABDOMINAL PAIN: 0
COUGH: 0
SHORTNESS OF BREATH: 0
WHEEZING: 0
CHEST TIGHTNESS: 0
CONSTIPATION: 0
SINUS PRESSURE: 0
SORE THROAT: 0
EYE DISCHARGE: 0
BACK PAIN: 0

## 2019-01-30 LAB
ESTIMATED AVERAGE GLUCOSE: 263.3 MG/DL
HBA1C MFR BLD: 10.8 %

## 2019-02-11 DIAGNOSIS — F41.1 GAD (GENERALIZED ANXIETY DISORDER): ICD-10-CM

## 2019-02-12 RX ORDER — ALPRAZOLAM 1 MG/1
1 TABLET ORAL NIGHTLY PRN
Qty: 30 TABLET | Refills: 2 | Status: SHIPPED | OUTPATIENT
Start: 2019-02-12 | End: 2019-05-13 | Stop reason: SDUPTHER

## 2019-02-25 DIAGNOSIS — R79.89 ELEVATED TSH: ICD-10-CM

## 2019-02-27 ENCOUNTER — TELEPHONE (OUTPATIENT)
Dept: ENDOCRINOLOGY | Age: 49
End: 2019-02-27

## 2019-02-27 RX ORDER — ROSUVASTATIN CALCIUM 10 MG/1
10 TABLET, COATED ORAL DAILY
Qty: 30 TABLET | Refills: 0 | Status: SHIPPED | OUTPATIENT
Start: 2019-02-27 | End: 2019-04-04 | Stop reason: SDUPTHER

## 2019-03-11 RX ORDER — SERTRALINE HYDROCHLORIDE 25 MG/1
TABLET, FILM COATED ORAL
Qty: 90 TABLET | Refills: 5 | Status: SHIPPED | OUTPATIENT
Start: 2019-03-11 | End: 2019-07-01 | Stop reason: SDUPTHER

## 2019-04-02 ENCOUNTER — HOSPITAL ENCOUNTER (OUTPATIENT)
Dept: WOMENS IMAGING | Age: 49
Discharge: HOME | End: 2019-04-02
Payer: COMMERCIAL

## 2019-04-02 DIAGNOSIS — Z12.31 ENCOUNTER FOR SCREENING MAMMOGRAM FOR BREAST CANCER: ICD-10-CM

## 2019-04-02 PROCEDURE — 77067 SCR MAMMO BI INCL CAD: CPT

## 2019-04-05 RX ORDER — ROSUVASTATIN CALCIUM 10 MG/1
TABLET, COATED ORAL
Qty: 30 TABLET | Refills: 0 | Status: SHIPPED | OUTPATIENT
Start: 2019-04-05 | End: 2019-05-06 | Stop reason: SDUPTHER

## 2019-04-11 ENCOUNTER — HOSPITAL ENCOUNTER (OUTPATIENT)
Age: 49
Discharge: HOME | End: 2019-04-11
Payer: COMMERCIAL

## 2019-04-11 DIAGNOSIS — R79.89 ELEVATED TSH: ICD-10-CM

## 2019-04-11 LAB — TSH REFLEX: 3.09 UIU/ML (ref 0.27–4.2)

## 2019-04-11 PROCEDURE — 84443 ASSAY THYROID STIM HORMONE: CPT

## 2019-04-11 PROCEDURE — 36415 COLL VENOUS BLD VENIPUNCTURE: CPT

## 2019-05-06 RX ORDER — ROSUVASTATIN CALCIUM 10 MG/1
TABLET, COATED ORAL
Qty: 30 TABLET | Refills: 0 | Status: SHIPPED | OUTPATIENT
Start: 2019-05-06 | End: 2019-05-31 | Stop reason: SDUPTHER

## 2019-05-06 NOTE — TELEPHONE ENCOUNTER
Refill request for Rosuvastatin medication.      Name of Pharmacy- [facility]    Last visit - 1/29/19     Pending visit - 5/8/19    Last refill -4/5/19    Medication Contract signed -   Last Oarrs ran-     Additional Comments

## 2019-05-08 ENCOUNTER — OFFICE VISIT (OUTPATIENT)
Dept: ENDOCRINOLOGY | Age: 49
End: 2019-05-08
Payer: COMMERCIAL

## 2019-05-08 VITALS
DIASTOLIC BLOOD PRESSURE: 86 MMHG | HEART RATE: 84 BPM | SYSTOLIC BLOOD PRESSURE: 140 MMHG | WEIGHT: 220 LBS | BODY MASS INDEX: 38.98 KG/M2 | HEIGHT: 63 IN

## 2019-05-08 DIAGNOSIS — R79.89 HIGH THYROID STIMULATING HORMONE (TSH) LEVEL: ICD-10-CM

## 2019-05-08 DIAGNOSIS — E78.2 MIXED HYPERLIPIDEMIA: ICD-10-CM

## 2019-05-08 DIAGNOSIS — E66.9 OBESITY WITH SERIOUS COMORBIDITY, UNSPECIFIED CLASSIFICATION, UNSPECIFIED OBESITY TYPE: ICD-10-CM

## 2019-05-08 DIAGNOSIS — E55.9 VITAMIN D DEFICIENCY: ICD-10-CM

## 2019-05-08 DIAGNOSIS — I10 ESSENTIAL HYPERTENSION: ICD-10-CM

## 2019-05-08 LAB — HBA1C MFR BLD: 9.5 %

## 2019-05-08 PROCEDURE — 95250 CONT GLUC MNTR PHYS/QHP EQP: CPT | Performed by: NURSE PRACTITIONER

## 2019-05-08 PROCEDURE — 99203 OFFICE O/P NEW LOW 30 MIN: CPT | Performed by: NURSE PRACTITIONER

## 2019-05-08 PROCEDURE — 83036 HEMOGLOBIN GLYCOSYLATED A1C: CPT | Performed by: NURSE PRACTITIONER

## 2019-05-08 ASSESSMENT — ENCOUNTER SYMPTOMS
COLOR CHANGE: 0
CONSTIPATION: 0
EYE PAIN: 0
NAUSEA: 0
SHORTNESS OF BREATH: 0
DIARRHEA: 0

## 2019-05-08 NOTE — PROGRESS NOTES
Endocrinology  [facility] Stewart, [facility]  Phone: [phone]   FAX: [phone]    Deja Pena is a 46 y.o. female who is a new patient presenting for management of Diabetes Mellitus Type 2. Last A1C:   Lab Results   Component Value Date    LABA1C 9.5 05/08/2019    LABA1C 10.8 01/29/2019    LABA1C 8.6 04/26/2018     Last BP Readings:  BP Readings from Last 3 Encounters:   05/08/19 (!) 140/86   01/29/19 112/70   10/31/18 (!) 163/92     Last LDL:   Lab Results   Component Value Date    LDLCALC 175 (H) 01/29/2019    LDLCHOLESTEROL 150 (H) 08/15/2017     Aspirin Use: 81 mg     Tobacco/Alcohol History:    Smoking status: Never Smoker    Smokeless tobacco: Never Used    Alcohol use: Yes     Alcohol/week: 0.0 oz     Comment: Jose Guadalupe Robertson  has been diabetic for 5  years and on insulin for  5 years. Diagnosed after being on high dose prednisone for auto immune hepatitis, was in ED with DKA and BG > 600  Patient reports that diabetes is generally not well controlled. Disease course has been variable  Current microvascular complications include none  Current Macrovascular complications include no h/o CAD, PVD  Patient reports compliance for about 80% of the time and adheres to medication, but usually not to diet and exercise instructions. There have been no recent hospital or ED admissions for hypoglycemia, hyperglycemia or DKA.   Other ED visit include for   PMH includes  Past Medical History:   Diagnosis Date    Anxiety     B12 deficiency     Depression 9/12/2013    Diabetes mellitus type 2, uncontrolled ([address] 5/9/2012    Edema of hand     Edema of lower extremity     Hepatitis     Hyperglycemia     Hyperlipidemia     Hypertension     Insomnia     Obesity     PUD (peptic ulcer disease)     Vertigo     Vitamin D insufficiency        Blood glucose trends:  Patient brought log glucometer for download: no  Readings per day  <1  per patient report  Average reading 01/29/2019    VITD25 30.0 08/15/2017    VITD25 31.6 05/12/2016     Hypertension  Currently on HCTZ 5 mg. Controlled , denies symptoms of dizziness, light headedness. Occasional dependent edema. Tries to follow a salt restricted diet. Lab Results   Component Value Date     01/29/2019    K 4.2 01/29/2019    CL 97 (L) 01/29/2019    CO2 25 01/29/2019    BUN 13 01/29/2019    CREATININE 0.5 (L) 01/29/2019    GLUCOSE 191 (H) 01/29/2019    CALCIUM 9.9 01/29/2019    PROT 7.2 01/29/2019    LABALBU 4.4 01/29/2019    BILITOT 0.3 01/29/2019    ALKPHOS 95 01/29/2019    AST 18 01/29/2019    ALT 16 01/29/2019    LABGLOM >60 01/29/2019    GFRAA >60 01/29/2019    AGRATIO 1.6 01/29/2019    GLOB 2.8 01/29/2019     The 10-year ASCVD risk score (Sarai Grady., et al., 2013) is: 5.6%    Values used to calculate the score:      Age: 46 years      Sex: Female      Is Non- : No      Diabetic: Yes      Tobacco smoker: No      Systolic Blood Pressure: 215 mmHg      Is BP treated: Yes      HDL Cholesterol: 58 mg/dL      Total Cholesterol: 279 mg/dL    Family History   Problem Relation Age of Onset    High Cholesterol Mother     Heart Disease Father     High Blood Pressure Father     Diabetes Father     High Cholesterol Father     Thyroid Disease Sister     Cancer Maternal Aunt         breast    Heart Disease Paternal Uncle     Cancer Maternal Grandmother         breast     Review of Systems   Constitutional: Negative for activity change, appetite change, diaphoresis, fever and unexpected weight change. HENT: Negative for dental problem. Eyes: Negative for pain and visual disturbance. Respiratory: Negative for shortness of breath. Cardiovascular: Negative for chest pain, palpitations and leg swelling. Gastrointestinal: Negative for constipation, diarrhea and nausea. Endocrine: Negative for cold intolerance, heat intolerance, polydipsia, polyphagia and polyuria.    Genitourinary: Negative for without complication, with long-term current use of insulin ([facility] [address] - Primary     Lab Results   Component Value Date    LABA1C 9.5 05/08/2019     Goal A1c is <6.5  Discussed lantus titration  Titrate to 90 -130 fasting AM  Clarify current diabetic status: c-pep and antibodies  Patient does not check often: sensor applied to review patterns  Describes a fairly low carb diet  Interested in personal sensor  Diet : 30-40 grams per meal , 3 meals per day, avoid carbs in snack choices  Ensure adequate hydration and  electrolyte replacement    Exercise :Recommended exercise is 5-7 days a week for 30-60 mins at least, per day OR a total of 2.5 hours per week , which ever is more feasible for you    Diabetic Health Maintenance  Follow up with annual eye exams  Follow up with annual podiatry exams  Check feet daily and make sure injuries do not go unnoticed. Other areas of Diabetic Education reviewed:   Carbs: good carbs and bad carbs, importance of carb counting, incorporation of protein with each meal to reduce Glycemic index, importance of portions, Carb/insulin ratio   Fats: Good fats and bad fats, meal planning and supplements.  Discussed how food affects blood sugar readings.     Insulin pumps, how they work and how they affect blood sugar levels   Different diabetic medications based on diabetic status   Managing high and low sugar readings   Rotation of sites for subcutaneous medication injection         Relevant Medications    insulin glargine (TOUJEO MAX SOLOSTAR) 300 UNIT/ML injection pen    Other Relevant Orders    POCT glycosylated hemoglobin (Hb A1C) (Completed)    C-Peptide    Glutamic Acid Decarboxylase    INSULIN ANTIBODY    VA CONT GLUC MNTR PHYSICIAN/QHP PROVIDED EQUIPTMENT (Completed)    Vitamin D deficiency     Lab Results   Component Value Date    VITD25 29.4 (L) 01/29/2019     Supplement @ > 1000IU QD         High thyroid stimulating hormone (TSH) level     Recheck TFT off of biotin  Antibody test

## 2019-05-08 NOTE — PROGRESS NOTES
Inserted Freestyle mauricio during visit. .freestylelibrepro inserted on 5/8/2019 11:36 AM. Site: Left arm    LOT# [id number]  Exp: 9/30/2019   . Return on 5/15/19 to have sensor removed. Advised pt to come in between 8am-430pm.     Pt stated understanding.

## 2019-05-13 DIAGNOSIS — F41.1 GAD (GENERALIZED ANXIETY DISORDER): ICD-10-CM

## 2019-05-14 RX ORDER — ALPRAZOLAM 1 MG/1
1 TABLET ORAL NIGHTLY PRN
Qty: 30 TABLET | Refills: 2 | Status: SHIPPED | OUTPATIENT
Start: 2019-05-14 | End: 2019-08-19 | Stop reason: SDUPTHER

## 2019-05-14 NOTE — TELEPHONE ENCOUNTER
Refill request for alprazolam medication.      Name of Pharmacy- [facility]    Last visit - 1-29-19     Pending visit - none    Last refill -3-14-19    Medication Contract signed -6-18-15   Last [facility] ran- 1-29-19  Additional Comments

## 2019-05-14 NOTE — TELEPHONE ENCOUNTER
Prescription sent electronically to pharmacy. Controlled Substances Monitoring:     RX Monitoring 5/14/2019   Attestation The Prescription Monitoring Report for this patient was reviewed today. Chronic Pain Routine Monitoring No signs of potential drug abuse or diversion identified: otherwise, see note documentation     Med contract on file.

## 2019-05-15 ENCOUNTER — HOSPITAL ENCOUNTER (OUTPATIENT)
Age: 49
Discharge: HOME | End: 2019-05-15
Payer: COMMERCIAL

## 2019-05-15 DIAGNOSIS — R79.89 HIGH THYROID STIMULATING HORMONE (TSH) LEVEL: ICD-10-CM

## 2019-05-15 LAB
T3 FREE: 2.6 PG/ML (ref 2.3–4.2)
T4 FREE: 1 NG/DL (ref 0.9–1.8)
THYROID PEROXIDASE (TPO) ABS: 38 IU/ML
TSH SERPL DL<=0.05 MIU/L-ACNC: 2.03 UIU/ML (ref 0.27–4.2)

## 2019-05-15 PROCEDURE — 86341 ISLET CELL ANTIBODY: CPT

## 2019-05-15 PROCEDURE — 84681 ASSAY OF C-PEPTIDE: CPT

## 2019-05-15 PROCEDURE — 84439 ASSAY OF FREE THYROXINE: CPT

## 2019-05-15 PROCEDURE — 86376 MICROSOMAL ANTIBODY EACH: CPT

## 2019-05-15 PROCEDURE — 84481 FREE ASSAY (FT-3): CPT

## 2019-05-15 PROCEDURE — 86337 INSULIN ANTIBODIES: CPT

## 2019-05-15 PROCEDURE — 36415 COLL VENOUS BLD VENIPUNCTURE: CPT

## 2019-05-15 PROCEDURE — 84443 ASSAY THYROID STIM HORMONE: CPT

## 2019-05-17 LAB
C-PEPTIDE: 1.8 NG/ML (ref 1.1–4.4)
GLUTAMIC ACID DECARB AB: <5 IU/ML (ref 0–5)
INSULIN A: <0.4 U/ML (ref 0–0.4)

## 2019-06-10 RX ORDER — METFORMIN HYDROCHLORIDE 500 MG/1
2000 TABLET, EXTENDED RELEASE ORAL
Qty: 360 TABLET | Refills: 1 | Status: SHIPPED | OUTPATIENT
Start: 2019-06-10 | End: 2019-11-15 | Stop reason: SDUPTHER

## 2019-06-10 NOTE — TELEPHONE ENCOUNTER
Refill request for metformin  medication.      Name of Pharmacy- [facility]    Last visit - 1-29-19     Pending visit - 6-27-19    Last refill -12-13-18    Medication Contract signed -   Last Cierra contreras-     Additional Comments

## 2019-06-27 ENCOUNTER — TELEPHONE (OUTPATIENT)
Dept: INTERNAL MEDICINE CLINIC | Age: 49
End: 2019-06-27

## 2019-06-27 NOTE — TELEPHONE ENCOUNTER
Received call from [facility], patient taking sertraline 25mg, 3 tablets daily    Insurance only covers 1 tablet per day.     Please address

## 2019-06-28 NOTE — TELEPHONE ENCOUNTER
[phone] (home) [phone] (work)  [phone] spoke with patient, she states she is fine with just keeping it at 25mg 3 tablets per day. She will just pay the difference. She does not need a refill at this time.

## 2019-07-02 ENCOUNTER — TELEPHONE (OUTPATIENT)
Dept: INTERNAL MEDICINE CLINIC | Age: 49
End: 2019-07-02

## 2019-07-09 DIAGNOSIS — I10 ESSENTIAL HYPERTENSION: ICD-10-CM

## 2019-07-09 DIAGNOSIS — R60.9 EDEMA, UNSPECIFIED TYPE: ICD-10-CM

## 2019-07-09 RX ORDER — HYDROCHLOROTHIAZIDE 25 MG/1
TABLET ORAL
Qty: 90 TABLET | Refills: 0 | Status: SHIPPED | OUTPATIENT
Start: 2019-07-09 | End: 2019-10-01 | Stop reason: SDUPTHER

## 2019-07-18 ENCOUNTER — TELEPHONE (OUTPATIENT)
Dept: INTERNAL MEDICINE CLINIC | Age: 49
End: 2019-07-18

## 2019-07-18 DIAGNOSIS — E78.2 MIXED HYPERLIPIDEMIA: Primary | ICD-10-CM

## 2019-07-18 DIAGNOSIS — I10 ESSENTIAL HYPERTENSION: ICD-10-CM

## 2019-07-31 ENCOUNTER — HOSPITAL ENCOUNTER (OUTPATIENT)
Age: 49
Discharge: HOME | End: 2019-07-31
Payer: COMMERCIAL

## 2019-07-31 ENCOUNTER — OFFICE VISIT (OUTPATIENT)
Dept: INTERNAL MEDICINE CLINIC | Age: 49
End: 2019-07-31
Payer: COMMERCIAL

## 2019-07-31 VITALS
BODY MASS INDEX: 39.34 KG/M2 | OXYGEN SATURATION: 98 % | HEIGHT: 63 IN | HEART RATE: 72 BPM | DIASTOLIC BLOOD PRESSURE: 90 MMHG | WEIGHT: 222 LBS | SYSTOLIC BLOOD PRESSURE: 136 MMHG

## 2019-07-31 DIAGNOSIS — I10 ESSENTIAL HYPERTENSION: ICD-10-CM

## 2019-07-31 DIAGNOSIS — E66.9 OBESITY (BMI 35.0-39.9 WITHOUT COMORBIDITY): ICD-10-CM

## 2019-07-31 DIAGNOSIS — R30.0 DYSURIA: ICD-10-CM

## 2019-07-31 DIAGNOSIS — E78.2 MIXED HYPERLIPIDEMIA: ICD-10-CM

## 2019-07-31 DIAGNOSIS — I10 ESSENTIAL HYPERTENSION: Primary | ICD-10-CM

## 2019-07-31 LAB
A/G RATIO: 1.6 (ref 1.1–2.2)
ALBUMIN SERPL-MCNC: 4.5 G/DL (ref 3.4–5)
ALP BLD-CCNC: 88 U/L (ref 40–129)
ALT SERPL-CCNC: 11 U/L (ref 10–40)
ANION GAP SERPL CALCULATED.3IONS-SCNC: 13 MMOL/L (ref 3–16)
AST SERPL-CCNC: 17 U/L (ref 15–37)
BILIRUB SERPL-MCNC: 0.3 MG/DL (ref 0–1)
BILIRUBIN, POC: NORMAL
BLOOD URINE, POC: NORMAL
BUN BLDV-MCNC: 15 MG/DL (ref 7–20)
CALCIUM SERPL-MCNC: 10 MG/DL (ref 8.3–10.6)
CHLORIDE BLD-SCNC: 93 MMOL/L (ref 99–110)
CHOLESTEROL, TOTAL: 281 MG/DL (ref 0–199)
CLARITY, POC: CLEAR
CO2: 29 MMOL/L (ref 21–32)
COLOR, POC: NORMAL
CREAT SERPL-MCNC: 0.6 MG/DL (ref 0.6–1.1)
GFR AFRICAN AMERICAN: >60
GFR NON-AFRICAN AMERICAN: >60
GLOBULIN: 2.9 G/DL
GLUCOSE BLD-MCNC: 233 MG/DL (ref 70–99)
GLUCOSE URINE, POC: NORMAL
HDLC SERPL-MCNC: 70 MG/DL (ref 40–60)
KETONES, POC: NORMAL
LDL CHOLESTEROL CALCULATED: 170 MG/DL
LEUKOCYTE EST, POC: NORMAL
NITRITE, POC: NORMAL
PH, POC: 5
POTASSIUM SERPL-SCNC: 4.4 MMOL/L (ref 3.5–5.1)
PROTEIN, POC: NORMAL
SODIUM BLD-SCNC: 135 MMOL/L (ref 136–145)
SPECIFIC GRAVITY, POC: 1010
TOTAL PROTEIN: 7.4 G/DL (ref 6.4–8.2)
TRIGL SERPL-MCNC: 207 MG/DL (ref 0–150)
UROBILINOGEN, POC: NORMAL
VLDLC SERPL CALC-MCNC: 41 MG/DL

## 2019-07-31 PROCEDURE — 80061 LIPID PANEL: CPT

## 2019-07-31 PROCEDURE — 36415 COLL VENOUS BLD VENIPUNCTURE: CPT

## 2019-07-31 PROCEDURE — 99214 OFFICE O/P EST MOD 30 MIN: CPT | Performed by: NURSE PRACTITIONER

## 2019-07-31 PROCEDURE — 80053 COMPREHEN METABOLIC PANEL: CPT

## 2019-07-31 PROCEDURE — 81002 URINALYSIS NONAUTO W/O SCOPE: CPT | Performed by: NURSE PRACTITIONER

## 2019-07-31 RX ORDER — ALPRAZOLAM 1 MG/1
1 TABLET ORAL
Refills: 2 | COMMUNITY
Start: 2019-07-25 | End: 2019-08-19

## 2019-07-31 ASSESSMENT — ENCOUNTER SYMPTOMS
BLOOD IN STOOL: 0
WHEEZING: 0
COUGH: 0
NAUSEA: 0
VOMITING: 0
CONSTIPATION: 0
ABDOMINAL PAIN: 0
SHORTNESS OF BREATH: 0
EYE DISCHARGE: 0
DIARRHEA: 0

## 2019-07-31 NOTE — PROGRESS NOTES
07/31/19    Jens Red  46 y.o.  female    Chief Complaint   Patient presents with    Hyperlipidemia    Hypertension    Peptic Ulcer Disease         HPI:     Pt presents for review of labs and evaluation of DM, HTN, hyperlipidemia and obesity. Unfortunately she did not go for lab draw until this morning. She is now seeing Dr. Petra Tuttle for management of DM and thyroid disorder. HTN: elevated here today which she attributes to rushing around this morning. HYPERLIPIDEMIA: will await lab results    BP (!) 136/90 (Site: Right Upper Arm, Position: Sitting, Cuff Size: Medium Adult)   Pulse 72   Ht 5' 3\" (1.6 m)   Wt 222 lb (100.7 kg)   LMP 01/06/2005   SpO2 98%   BMI 39.33 kg/m²        Current Outpatient Medications   Medication Sig Dispense Refill    hydrochlorothiazide (HYDRODIURIL) 25 MG tablet TAKE 1 TABLET BY MOUTH EVERY DAY 90 tablet 0    sertraline (ZOLOFT) 25 MG tablet TAKE 3 TABLETS BY MOUTH EVERY DAY 90 tablet 1    metFORMIN (GLUCOPHAGE-XR) 500 MG extended release tablet TAKE 4 TABLETS BY MOUTH DAILY (WITH BREAKFAST) 360 tablet 1    rosuvastatin (CRESTOR) 10 MG tablet TAKE 1 TABLET BY MOUTH EVERY DAY 30 tablet 5    insulin glargine (TOUJEO MAX SOLOSTAR) 300 UNIT/ML injection pen Inject 45 Units into the skin nightly (Patient taking differently: Inject 30 Units into the skin nightly ) 3 pen 3    blood glucose test strips (ASCENSIA AUTODISC VI;ONE TOUCH ULTRA TEST VI) strip Inject 1 each into the skin 4 times daily As needed. One Touch Ultra test strips. 150 each 11    Insulin Pen Needle (ULTICARE MINI PEN NEEDLES) 31G X 6 MM MISC USE 1 DAILY AS DIRECTED 100 each 5    aspirin 81 MG tablet Take 81 mg by mouth daily      Cholecalciferol (VITAMIN D3) 5000 UNITS CAPS Take 5,000 Int'l Units by mouth daily.  ranitidine (ZANTAC) 150 MG tablet Take 150 mg by mouth 2 times daily. No current facility-administered medications for this visit.         Social History Socioeconomic History    Marital status:      Spouse name: Clair Arroyo    Number of children: 2    Years of education: college    Highest education level: Not on file   Occupational History    Occupation: Homemaker   Social Needs    Financial resource strain: Not on file    Food insecurity:     Worry: Not on file     Inability: Not on file   [employer] needs:     Medical: Not on file     Non-medical: Not on file   Tobacco Use    Smoking status: Never Smoker    Smokeless tobacco: Never Used   Substance and Sexual Activity    Alcohol use:  Yes     Alcohol/week: 0.0 standard drinks     Comment: SOCIALLY    Drug use: No    Sexual activity: Yes     Partners: Male   Lifestyle    Physical activity:     Days per week: Not on file     Minutes per session: Not on file    Stress: Not on file   Relationships    Social connections:     Talks on phone: Not on file     Gets together: Not on file     Attends Hindu service: Not on file     Active member of club or organization: Not on file     Attends meetings of clubs or organizations: Not on file     Relationship status: Not on file    Intimate partner violence:     Fear of current or ex partner: Not on file     Emotionally abused: Not on file     Physically abused: Not on file     Forced sexual activity: Not on file   Other Topics Concern    Not on file   Social History Narrative    Not on file       Family History   Problem Relation Age of Onset    High Cholesterol Mother     Heart Disease Father     High Blood Pressure Father     Diabetes Father     High Cholesterol Father     Thyroid Disease Sister     Cancer Maternal Aunt         breast    Heart Disease Paternal Uncle     Cancer Maternal Grandmother         breast       Past Medical History:   Diagnosis Date    Anxiety     B12 deficiency     Depression 9/12/2013    [facility] 5/9/2012    Edema of hand     Edema of lower extremity    

## 2019-08-19 DIAGNOSIS — F41.1 GAD (GENERALIZED ANXIETY DISORDER): ICD-10-CM

## 2019-08-19 RX ORDER — ALPRAZOLAM 1 MG/1
1 TABLET ORAL NIGHTLY PRN
Qty: 30 TABLET | Refills: 2 | Status: SHIPPED | OUTPATIENT
Start: 2019-08-19 | End: 2019-08-22 | Stop reason: SDUPTHER

## 2019-08-22 ENCOUNTER — TELEPHONE (OUTPATIENT)
Dept: INTERNAL MEDICINE CLINIC | Age: 49
End: 2019-08-22

## 2019-08-22 DIAGNOSIS — F41.1 GAD (GENERALIZED ANXIETY DISORDER): ICD-10-CM

## 2019-08-22 RX ORDER — ALPRAZOLAM 1 MG/1
1 TABLET ORAL NIGHTLY PRN
Qty: 30 TABLET | Refills: 2 | OUTPATIENT
Start: 2019-08-22 | End: 2019-09-21

## 2019-08-22 RX ORDER — ALPRAZOLAM 1 MG/1
1 TABLET ORAL NIGHTLY PRN
Qty: 30 TABLET | Refills: 0 | Status: SHIPPED | OUTPATIENT
Start: 2019-08-22 | End: 2019-09-22 | Stop reason: SDUPTHER

## 2019-08-22 NOTE — TELEPHONE ENCOUNTER
Yes she filled it but can not pick it up. She wants us to call the pharmacy to give a verbal that it is ok to  early. The pharmacy will not give it to her.

## 2019-09-22 DIAGNOSIS — F41.1 GAD (GENERALIZED ANXIETY DISORDER): ICD-10-CM

## 2019-09-23 RX ORDER — ALPRAZOLAM 1 MG/1
1 TABLET ORAL NIGHTLY PRN
Qty: 30 TABLET | Refills: 0 | Status: SHIPPED | OUTPATIENT
Start: 2019-09-23 | End: 2019-10-22 | Stop reason: SDUPTHER

## 2019-09-23 NOTE — TELEPHONE ENCOUNTER
Refill request for ALPRAZOLAM medication.      Name of Pharmacy- [facility]      Last visit - 7/31/19     Pending visit - 2/4/20    Last refill -8/22/19      Medication Contract signed -Virginie contreras- 5/31/19        Additional Comments

## 2019-10-01 DIAGNOSIS — R60.9 EDEMA, UNSPECIFIED TYPE: ICD-10-CM

## 2019-10-01 DIAGNOSIS — I10 ESSENTIAL HYPERTENSION: ICD-10-CM

## 2019-10-01 RX ORDER — INSULIN GLARGINE 300 U/ML
INJECTION, SOLUTION SUBCUTANEOUS
Qty: 3 PEN | Refills: 3 | Status: SHIPPED | OUTPATIENT
Start: 2019-10-01 | End: 2020-05-15 | Stop reason: SDUPTHER

## 2019-10-01 RX ORDER — HYDROCHLOROTHIAZIDE 25 MG/1
TABLET ORAL
Qty: 90 TABLET | Refills: 0 | Status: SHIPPED | OUTPATIENT
Start: 2019-10-01 | End: 2019-12-24

## 2019-10-02 ENCOUNTER — OFFICE VISIT (OUTPATIENT)
Dept: ENDOCRINOLOGY | Age: 49
End: 2019-10-02
Payer: COMMERCIAL

## 2019-10-02 ENCOUNTER — HOSPITAL ENCOUNTER (OUTPATIENT)
Age: 49
Discharge: HOME | End: 2019-10-02
Payer: COMMERCIAL

## 2019-10-02 VITALS
DIASTOLIC BLOOD PRESSURE: 78 MMHG | WEIGHT: 224 LBS | HEIGHT: 63 IN | OXYGEN SATURATION: 97 % | HEART RATE: 72 BPM | SYSTOLIC BLOOD PRESSURE: 136 MMHG | BODY MASS INDEX: 39.69 KG/M2

## 2019-10-02 DIAGNOSIS — E06.3 HASHIMOTO'S THYROIDITIS: ICD-10-CM

## 2019-10-02 DIAGNOSIS — E55.9 VITAMIN D DEFICIENCY: ICD-10-CM

## 2019-10-02 DIAGNOSIS — I10 ESSENTIAL HYPERTENSION: ICD-10-CM

## 2019-10-02 DIAGNOSIS — E04.9 GOITER: ICD-10-CM

## 2019-10-02 DIAGNOSIS — E66.9 OBESITY WITH SERIOUS COMORBIDITY, UNSPECIFIED CLASSIFICATION, UNSPECIFIED OBESITY TYPE: ICD-10-CM

## 2019-10-02 DIAGNOSIS — E78.2 MIXED HYPERLIPIDEMIA: ICD-10-CM

## 2019-10-02 LAB
HBA1C MFR BLD: 8.3 %
T3 FREE: 3.3 PG/ML (ref 2.3–4.2)
T4 FREE: 1.2 NG/DL (ref 0.9–1.8)
TSH SERPL DL<=0.05 MIU/L-ACNC: 3.16 UIU/ML (ref 0.27–4.2)

## 2019-10-02 PROCEDURE — 36415 COLL VENOUS BLD VENIPUNCTURE: CPT

## 2019-10-02 PROCEDURE — 84439 ASSAY OF FREE THYROXINE: CPT

## 2019-10-02 PROCEDURE — 84443 ASSAY THYROID STIM HORMONE: CPT

## 2019-10-02 PROCEDURE — 83036 HEMOGLOBIN GLYCOSYLATED A1C: CPT | Performed by: NURSE PRACTITIONER

## 2019-10-02 PROCEDURE — 84481 FREE ASSAY (FT-3): CPT

## 2019-10-02 PROCEDURE — 99214 OFFICE O/P EST MOD 30 MIN: CPT | Performed by: NURSE PRACTITIONER

## 2019-10-02 ASSESSMENT — ENCOUNTER SYMPTOMS
EYE PAIN: 0
CONSTIPATION: 0
NAUSEA: 0
SHORTNESS OF BREATH: 0
COLOR CHANGE: 0
DIARRHEA: 0

## 2019-10-08 ENCOUNTER — TELEPHONE (OUTPATIENT)
Dept: ENDOCRINOLOGY | Age: 49
End: 2019-10-08

## 2019-10-08 ENCOUNTER — HOSPITAL ENCOUNTER (OUTPATIENT)
Dept: ULTRASOUND IMAGING | Age: 49
Discharge: HOME | End: 2019-10-08
Payer: COMMERCIAL

## 2019-10-08 DIAGNOSIS — E04.9 GOITER: ICD-10-CM

## 2019-10-08 PROCEDURE — 76536 US EXAM OF HEAD AND NECK: CPT

## 2019-10-22 DIAGNOSIS — F41.1 GAD (GENERALIZED ANXIETY DISORDER): ICD-10-CM

## 2019-10-23 RX ORDER — ALPRAZOLAM 1 MG/1
1 TABLET ORAL NIGHTLY PRN
Qty: 30 TABLET | Refills: 0 | Status: SHIPPED | OUTPATIENT
Start: 2019-10-23 | End: 2019-12-19

## 2019-10-23 RX ORDER — ALPRAZOLAM 1 MG/1
1 TABLET ORAL NIGHTLY PRN
Qty: 30 TABLET | Refills: 0 | Status: SHIPPED | OUTPATIENT
Start: 2019-10-23 | End: 2019-10-23

## 2019-11-13 RX ORDER — DULAGLUTIDE 0.75 MG/.5ML
INJECTION, SOLUTION SUBCUTANEOUS
Qty: 4 PEN | Refills: 3 | Status: SHIPPED | OUTPATIENT
Start: 2019-11-13 | End: 2020-03-10 | Stop reason: SDUPTHER

## 2019-11-15 RX ORDER — METFORMIN HYDROCHLORIDE 500 MG/1
2000 TABLET, EXTENDED RELEASE ORAL
Qty: 360 TABLET | Refills: 1 | Status: SHIPPED | OUTPATIENT
Start: 2019-11-15 | End: 2020-05-15 | Stop reason: SDUPTHER

## 2019-12-06 RX ORDER — SERTRALINE HYDROCHLORIDE 25 MG/1
TABLET, FILM COATED ORAL
Qty: 90 TABLET | Refills: 1 | Status: SHIPPED | OUTPATIENT
Start: 2019-12-06 | End: 2020-02-11

## 2019-12-18 DIAGNOSIS — F41.1 GAD (GENERALIZED ANXIETY DISORDER): ICD-10-CM

## 2019-12-19 DIAGNOSIS — F41.1 GAD (GENERALIZED ANXIETY DISORDER): ICD-10-CM

## 2019-12-19 RX ORDER — ALPRAZOLAM 1 MG/1
1 TABLET ORAL NIGHTLY PRN
Qty: 30 TABLET | Refills: 0 | OUTPATIENT
Start: 2019-12-19 | End: 2020-01-18

## 2019-12-19 RX ORDER — ALPRAZOLAM 1 MG/1
1 TABLET ORAL NIGHTLY PRN
Qty: 30 TABLET | Refills: 0 | Status: SHIPPED | OUTPATIENT
Start: 2019-12-19 | End: 2019-12-20 | Stop reason: SDUPTHER

## 2019-12-20 RX ORDER — ALPRAZOLAM 1 MG/1
1 TABLET ORAL NIGHTLY PRN
Qty: 30 TABLET | Refills: 0 | Status: SHIPPED | OUTPATIENT
Start: 2019-12-20 | End: 2020-02-18

## 2019-12-24 DIAGNOSIS — I10 ESSENTIAL HYPERTENSION: ICD-10-CM

## 2019-12-24 DIAGNOSIS — R60.9 EDEMA, UNSPECIFIED TYPE: ICD-10-CM

## 2019-12-24 RX ORDER — HYDROCHLOROTHIAZIDE 25 MG/1
TABLET ORAL
Qty: 90 TABLET | Refills: 0 | Status: SHIPPED | OUTPATIENT
Start: 2019-12-24 | End: 2020-08-11

## 2020-02-11 RX ORDER — SERTRALINE HYDROCHLORIDE 25 MG/1
TABLET, FILM COATED ORAL
Qty: 90 TABLET | Refills: 1 | Status: SHIPPED | OUTPATIENT
Start: 2020-02-11 | End: 2020-02-13 | Stop reason: DRUGHIGH

## 2020-02-13 ENCOUNTER — OFFICE VISIT (OUTPATIENT)
Dept: INTERNAL MEDICINE CLINIC | Age: 50
End: 2020-02-13
Payer: COMMERCIAL

## 2020-02-13 VITALS
OXYGEN SATURATION: 96 % | WEIGHT: 217 LBS | DIASTOLIC BLOOD PRESSURE: 84 MMHG | BODY MASS INDEX: 38.44 KG/M2 | SYSTOLIC BLOOD PRESSURE: 130 MMHG | HEART RATE: 77 BPM

## 2020-02-13 PROCEDURE — 96372 THER/PROPH/DIAG INJ SC/IM: CPT | Performed by: NURSE PRACTITIONER

## 2020-02-13 PROCEDURE — 99214 OFFICE O/P EST MOD 30 MIN: CPT | Performed by: NURSE PRACTITIONER

## 2020-02-13 RX ORDER — ROSUVASTATIN CALCIUM 10 MG/1
10 TABLET, COATED ORAL DAILY
COMMUNITY
End: 2021-02-24 | Stop reason: SINTOL

## 2020-02-13 RX ORDER — CYANOCOBALAMIN 1000 UG/ML
1000 INJECTION INTRAMUSCULAR; SUBCUTANEOUS ONCE
Status: COMPLETED | OUTPATIENT
Start: 2020-02-13 | End: 2020-02-13

## 2020-02-13 RX ORDER — SERTRALINE HYDROCHLORIDE 25 MG/1
25 TABLET, FILM COATED ORAL DAILY
Qty: 90 TABLET | Refills: 3 | Status: SHIPPED | OUTPATIENT
Start: 2020-02-13 | End: 2021-02-02

## 2020-02-13 RX ADMIN — CYANOCOBALAMIN 1000 MCG: 1000 INJECTION INTRAMUSCULAR; SUBCUTANEOUS at 09:06

## 2020-02-13 ASSESSMENT — ENCOUNTER SYMPTOMS
COUGH: 0
ABDOMINAL PAIN: 0
WHEEZING: 0
VOMITING: 0
SHORTNESS OF BREATH: 0
CONSTIPATION: 0
EYE DISCHARGE: 0
BLOOD IN STOOL: 0
DIARRHEA: 0
NAUSEA: 0

## 2020-02-13 NOTE — PROGRESS NOTES
Medical History:   Diagnosis Date    Anxiety     B12 deficiency     Depression 9/12/2013    Diabetes mellitus type 2, uncontrolled ([facility] [address] 5/9/2012    Edema of hand     Edema of lower extremity     Hepatitis     Hyperglycemia     Hyperlipidemia     Hypertension     Insomnia     Obesity     PUD (peptic ulcer disease)     Vertigo     Vitamin D insufficiency        Review of Systems   Constitutional: Negative for fever. HENT: Negative for congestion. Eyes: Negative for discharge. Respiratory: Negative for cough, shortness of breath and wheezing. Cardiovascular: Negative for chest pain, palpitations and leg swelling. Gastrointestinal: Negative for abdominal pain, blood in stool, constipation, diarrhea, nausea and vomiting. Genitourinary: Negative for dysuria and hematuria. Musculoskeletal: Negative for myalgias. Skin: Negative for rash. Neurological: Negative for dizziness. Psychiatric/Behavioral: The patient is not nervous/anxious. Physical Exam  Constitutional:       General: She is not in acute distress. Appearance: She is not diaphoretic. HENT:      Right Ear: External ear normal.      Left Ear: External ear normal.      Mouth/Throat:      Pharynx: No oropharyngeal exudate. Eyes:      General: No scleral icterus. Right eye: No discharge. Left eye: No discharge. Neck:      Musculoskeletal: Normal range of motion. Thyroid: No thyromegaly. Cardiovascular:      Rate and Rhythm: Normal rate and regular rhythm. Heart sounds: Normal heart sounds. No murmur. No friction rub. No gallop. Pulmonary:      Effort: Pulmonary effort is normal.      Breath sounds: Normal breath sounds. No wheezing. Abdominal:      General: Bowel sounds are normal. There is no distension. Palpations: Abdomen is soft. Tenderness: There is no abdominal tenderness. Musculoskeletal: Normal range of motion. General: No tenderness.    Lymphadenopathy:

## 2020-02-18 RX ORDER — ALPRAZOLAM 1 MG/1
1 TABLET ORAL NIGHTLY PRN
Qty: 30 TABLET | Refills: 0 | Status: SHIPPED | OUTPATIENT
Start: 2020-02-18 | End: 2020-03-17

## 2020-02-20 ENCOUNTER — TELEPHONE (OUTPATIENT)
Dept: INTERNAL MEDICINE CLINIC | Age: 50
End: 2020-02-20

## 2020-02-20 NOTE — TELEPHONE ENCOUNTER
Called states he needs to know why were there 10 failed authorizations at order signing for the pt. ? Igor needs you to give him a call back at [phone] as soon as you can he states.

## 2020-03-10 RX ORDER — DULAGLUTIDE 0.75 MG/.5ML
INJECTION, SOLUTION SUBCUTANEOUS
Qty: 4 PEN | Refills: 3 | Status: SHIPPED | OUTPATIENT
Start: 2020-03-10 | End: 2020-06-15

## 2020-03-17 RX ORDER — ALPRAZOLAM 1 MG/1
1 TABLET ORAL NIGHTLY PRN
Qty: 30 TABLET | Refills: 0 | Status: SHIPPED | OUTPATIENT
Start: 2020-03-17 | End: 2020-04-16

## 2020-03-17 NOTE — TELEPHONE ENCOUNTER
Refill request for ALPRAZOLAM medication.      Name of Pharmacy- [facility]      Last visit - 2/13/20 W/SUSANA     Pending visit - 8/13/20    Last refill -2/18/20      Medication Contract signed - Vesta contreras- 2/18/20        Additional Comments

## 2020-04-16 RX ORDER — ALPRAZOLAM 1 MG/1
1 TABLET ORAL NIGHTLY PRN
Qty: 30 TABLET | Refills: 0 | Status: SHIPPED | OUTPATIENT
Start: 2020-04-16 | End: 2020-05-19

## 2020-04-16 NOTE — TELEPHONE ENCOUNTER
Refill request for ALPRAZOLAM 1 MG TABLET medication.      Name of [facility]acy- [facility]    Last visit - 8/16/2018       Pending visit -   Future Appointments   Date Time Provider Rylan Jaime   8/5/2020 11:20 AM JAMEL Huff - [facility]         Last refill -3/17/20    Medication Contract signed -6/18/15   Last Oarrs ran- 10/22/19    Additional Comments

## 2020-04-21 RX ORDER — CYANOCOBALAMIN 1000 UG/ML
1000 INJECTION INTRAMUSCULAR; SUBCUTANEOUS ONCE
Qty: 1 ML | Refills: 0 | Status: SHIPPED | OUTPATIENT
Start: 2020-04-21 | End: 2020-04-22 | Stop reason: SDUPTHER

## 2020-04-22 RX ORDER — CYANOCOBALAMIN 1000 UG/ML
1000 INJECTION INTRAMUSCULAR; SUBCUTANEOUS ONCE
Qty: 1 ML | Refills: 0 | Status: SHIPPED | OUTPATIENT
Start: 2020-04-22 | End: 2020-05-16 | Stop reason: SDUPTHER

## 2020-05-13 ENCOUNTER — HOSPITAL ENCOUNTER (OUTPATIENT)
Dept: WOMENS IMAGING | Age: 50
Discharge: HOME | End: 2020-05-13
Payer: COMMERCIAL

## 2020-05-13 PROCEDURE — 77067 SCR MAMMO BI INCL CAD: CPT

## 2020-05-15 NOTE — TELEPHONE ENCOUNTER
Refill request for metFORMIN (GLUCOPHAGE-XR) 500 MG extended release tablet medication.      Name of Pharmacy- [facility]    Last visit - 2/13/2020         Pending visit -   Future Appointments   Date Time Provider Rylan Jaime   8/5/2020 11:20 AM JAMEL Rodrigues - [facility]         Last refill -11/15/19    Medication Contract signed -6/18/15   Last Oarrs ran- 10/22/19    Additional Comments

## 2020-05-18 RX ORDER — METFORMIN HYDROCHLORIDE 500 MG/1
2000 TABLET, EXTENDED RELEASE ORAL
Qty: 360 TABLET | Refills: 1 | Status: SHIPPED | OUTPATIENT
Start: 2020-05-18 | End: 2020-11-06

## 2020-05-18 RX ORDER — INSULIN GLARGINE 300 U/ML
INJECTION, SOLUTION SUBCUTANEOUS
Qty: 3 PEN | Refills: 3 | Status: SHIPPED | OUTPATIENT
Start: 2020-05-18 | End: 2021-02-23 | Stop reason: ALTCHOICE

## 2020-05-18 RX ORDER — CYANOCOBALAMIN 1000 UG/ML
1000 INJECTION INTRAMUSCULAR; SUBCUTANEOUS ONCE
Qty: 1 ML | Refills: 0 | Status: SHIPPED | OUTPATIENT
Start: 2020-05-18 | End: 2020-07-14 | Stop reason: SDUPTHER

## 2020-05-18 NOTE — TELEPHONE ENCOUNTER
Refill request for alprazolam  medication.      Name of Pharmacy- [facility]     Last visit - 2-     Pending visit - 8-5-2020    Last refill -5-    Medication Contract signed -6-   Last Dayana Mueller ran- 2-    Additional Comments

## 2020-05-19 RX ORDER — ALPRAZOLAM 1 MG/1
1 TABLET ORAL NIGHTLY PRN
Qty: 30 TABLET | Refills: 0 | Status: SHIPPED | OUTPATIENT
Start: 2020-05-19 | End: 2020-06-11 | Stop reason: SDUPTHER

## 2020-05-20 ENCOUNTER — TELEPHONE (OUTPATIENT)
Dept: ENDOCRINOLOGY | Age: 50
End: 2020-05-20

## 2020-05-21 RX ORDER — INSULIN DEGLUDEC INJECTION 100 U/ML
45 INJECTION, SOLUTION SUBCUTANEOUS NIGHTLY
Qty: 5 PEN | Refills: 3 | Status: SHIPPED | OUTPATIENT
Start: 2020-05-21 | End: 2020-07-27 | Stop reason: SDUPTHER

## 2020-06-11 RX ORDER — ALPRAZOLAM 1 MG/1
1 TABLET ORAL NIGHTLY PRN
Qty: 30 TABLET | Refills: 0 | Status: SHIPPED | OUTPATIENT
Start: 2020-06-17 | End: 2020-07-14 | Stop reason: SDUPTHER

## 2020-06-11 NOTE — TELEPHONE ENCOUNTER
Refill request for xanax medication.      [facility]    Last visit - 2/13/20     Pending visit - 8/5/20    Last refill -5/19/20    Medication Contract signed -6/8/15   Last Oarrs ran- 5/19/20    Additional Comments

## 2020-06-15 RX ORDER — DULAGLUTIDE 0.75 MG/.5ML
0.75 INJECTION, SOLUTION SUBCUTANEOUS WEEKLY
Qty: 12 PEN | Refills: 1 | Status: SHIPPED | OUTPATIENT
Start: 2020-06-15 | End: 2020-08-13 | Stop reason: SDUPTHER

## 2020-06-15 RX ORDER — DULAGLUTIDE 0.75 MG/.5ML
INJECTION, SOLUTION SUBCUTANEOUS
Qty: 4 PEN | Refills: 1 | Status: SHIPPED | OUTPATIENT
Start: 2020-06-15 | End: 2020-06-15 | Stop reason: SDUPTHER

## 2020-07-15 RX ORDER — ALPRAZOLAM 1 MG/1
1 TABLET ORAL NIGHTLY PRN
Qty: 30 TABLET | Refills: 0 | Status: SHIPPED | OUTPATIENT
Start: 2020-07-15 | End: 2020-08-13 | Stop reason: SDUPTHER

## 2020-07-15 RX ORDER — CYANOCOBALAMIN 1000 UG/ML
1000 INJECTION INTRAMUSCULAR; SUBCUTANEOUS ONCE
Qty: 1 ML | Refills: 0 | Status: SHIPPED | OUTPATIENT
Start: 2020-07-15 | End: 2020-08-10

## 2020-07-15 NOTE — TELEPHONE ENCOUNTER
Refill request for B12 INJECTION, ALPRAZOLAM medication.      Name of [facility]      Last visit - 2/13/20 JOSE M/SUSANA     Pending visit - 8/5/20 JOSE M/SUSANA    Last refill -B12 5/18/20, ALPRAZOLAM 6/17/20      Medication Contract signed -YES  Last Oarrs ran- 6/11/20        Additional Comments

## 2020-07-27 NOTE — TELEPHONE ENCOUNTER
Medication:   Requested Prescriptions     Pending Prescriptions Disp Refills    Insulin Degludec (TRESIBA FLEXTOUCH) 100 UNIT/ML SOPN 15 pen 1     Sig: Inject 45 Units into the skin nightly         Last appt: 10/02/2019  Next appt: Visit date not found    Last OARRS:   RX Monitoring 6/11/2020   Attestation -   Periodic Controlled Substance Monitoring No signs of potential drug abuse or diversion identified.

## 2020-07-28 RX ORDER — INSULIN DEGLUDEC INJECTION 100 U/ML
45 INJECTION, SOLUTION SUBCUTANEOUS NIGHTLY
Qty: 15 PEN | Refills: 1 | Status: SHIPPED | OUTPATIENT
Start: 2020-07-28 | End: 2021-02-23 | Stop reason: ALTCHOICE

## 2020-08-10 RX ORDER — CYANOCOBALAMIN 1000 UG/ML
INJECTION INTRAMUSCULAR; SUBCUTANEOUS
Qty: 1 ML | Refills: 0 | Status: SHIPPED | OUTPATIENT
Start: 2020-08-10 | End: 2020-09-08

## 2020-08-10 NOTE — TELEPHONE ENCOUNTER
Refill request for cyanocobalamin medication.      [facility]    Last visit - 2/13/20     Pending visit - tomorrow    Last refill -7/15/20    Medication Contract signed -   Last Oarrs ran-     Additional Comments

## 2020-08-11 RX ORDER — HYDROCHLOROTHIAZIDE 25 MG/1
TABLET ORAL
Qty: 90 TABLET | Refills: 0 | Status: SHIPPED | OUTPATIENT
Start: 2020-08-11 | End: 2020-11-06

## 2020-08-11 NOTE — TELEPHONE ENCOUNTER
Refill request for HCTZ medication.      Name of Pharmacy- [facility]    Last visit - 2/13/2020     Pending visit - 9/3/2020    Last refill -12/24/19  0 refills

## 2020-08-14 RX ORDER — DULAGLUTIDE 0.75 MG/.5ML
0.75 INJECTION, SOLUTION SUBCUTANEOUS WEEKLY
Qty: 12 PEN | Refills: 1 | Status: SHIPPED | OUTPATIENT
Start: 2020-08-14 | End: 2021-01-18 | Stop reason: SDUPTHER

## 2020-08-14 RX ORDER — ALPRAZOLAM 1 MG/1
1 TABLET ORAL NIGHTLY PRN
Qty: 30 TABLET | Refills: 0 | Status: SHIPPED | OUTPATIENT
Start: 2020-08-14 | End: 2020-09-10 | Stop reason: SDUPTHER

## 2020-09-10 ENCOUNTER — TELEMEDICINE (OUTPATIENT)
Dept: INTERNAL MEDICINE CLINIC | Age: 50
End: 2020-09-10
Payer: COMMERCIAL

## 2020-09-10 PROBLEM — Z90.710 H/O TOTAL HYSTERECTOMY: Status: ACTIVE | Noted: 2018-02-05

## 2020-09-10 PROCEDURE — 99214 OFFICE O/P EST MOD 30 MIN: CPT | Performed by: FAMILY MEDICINE

## 2020-09-10 RX ORDER — ALPRAZOLAM 1 MG/1
1 TABLET ORAL NIGHTLY PRN
Qty: 30 TABLET | Refills: 2 | Status: SHIPPED | OUTPATIENT
Start: 2020-09-10 | End: 2020-12-09 | Stop reason: SDUPTHER

## 2020-09-10 ASSESSMENT — ENCOUNTER SYMPTOMS
CHEST TIGHTNESS: 0
SINUS PRESSURE: 0
EYE REDNESS: 0
NAUSEA: 0
VOMITING: 0
CONSTIPATION: 0
DIARRHEA: 0
EYE DISCHARGE: 0
TROUBLE SWALLOWING: 0
RHINORRHEA: 0
BACK PAIN: 0
SHORTNESS OF BREATH: 0
ABDOMINAL PAIN: 0
WHEEZING: 0
SORE THROAT: 0
COUGH: 0
EYE PAIN: 0

## 2020-09-10 ASSESSMENT — PATIENT HEALTH QUESTIONNAIRE - PHQ9
SUM OF ALL RESPONSES TO PHQ QUESTIONS 1-9: 0
SUM OF ALL RESPONSES TO PHQ9 QUESTIONS 1 & 2: 0
1. LITTLE INTEREST OR PLEASURE IN DOING THINGS: 0
SUM OF ALL RESPONSES TO PHQ QUESTIONS 1-9: 0
2. FEELING DOWN, DEPRESSED OR HOPELESS: 0

## 2020-09-10 NOTE — PATIENT INSTRUCTIONS
Continue current medicines. Continue healthy lifestyle changes (diet/exercise/attempt weight loss). Get fasting labs and urine test done soon. Do the home Cologuard test.  Schedule diabetes eye exam.  Follow up with Endocrinology as planned next month. Return in about 6 months (around 3/10/2021) for recheck DM, lipids, HTN, anxiety. Patient Education        Learning About Diabetes Food Guidelines  Your Care Instructions     Meal planning is important to manage diabetes. It helps keep your blood sugar at a target level (which you set with your doctor). You don't have to eat special foods. You can eat what your family eats, including sweets once in a while. But you do have to pay attention to how often you eat and how much you eat of certain foods. You may want to work with a dietitian or a certified diabetes educator (CDE) to help you plan meals and snacks. A dietitian or CDE can also help you lose weight if that is one of your goals. What should you know about eating carbs? Managing the amount of carbohydrate (carbs) you eat is an important part of healthy meals when you have diabetes. Carbohydrate is found in many foods. · Learn which foods have carbs. And learn the amounts of carbs in different foods. ? Bread, cereal, pasta, and rice have about 15 grams of carbs in a serving. A serving is 1 slice of bread (1 ounce), ½ cup of cooked cereal, or 1/3 cup of cooked pasta or rice. ? Fruits have 15 grams of carbs in a serving. A serving is 1 small fresh fruit, such as an apple or orange; ½ of a banana; ½ cup of cooked or canned fruit; ½ cup of fruit juice; 1 cup of melon or raspberries; or 2 tablespoons of dried fruit. ? Milk and no-sugar-added yogurt have 15 grams of carbs in a serving. A serving is 1 cup of milk or 2/3 cup of no-sugar-added yogurt. ? Starchy vegetables have 15 grams of carbs in a serving.  A serving is ½ cup of mashed potatoes or sweet potato; 1 cup winter squash; ½ of a small baked potato; ½ cup of cooked beans; or ½ cup cooked corn or green peas. · Learn how much carbs to eat each day and at each meal. A dietitian or CDE can teach you how to keep track of the amount of carbs you eat. This is called carbohydrate counting. · If you are not sure how to count carbohydrate grams, use the Plate Method to plan meals. It is a good, quick way to make sure that you have a balanced meal. It also helps you spread carbs throughout the day. ? Divide your plate by types of foods. Put non-starchy vegetables on half the plate, meat or other protein food on one-quarter of the plate, and a grain or starchy vegetable in the final quarter of the plate. To this you can add a small piece of fruit and 1 cup of milk or yogurt, depending on how many carbs you are supposed to eat at a meal.  · Try to eat about the same amount of carbs at each meal. Do not \"save up\" your daily allowance of carbs to eat at one meal.  · Proteins have very little or no carbs per serving. Examples of proteins are beef, chicken, turkey, fish, eggs, tofu, cheese, cottage cheese, and peanut butter. A serving size of meat is 3 ounces, which is about the size of a deck of cards. Examples of meat substitute serving sizes (equal to 1 ounce of meat) are 1/4 cup of cottage cheese, 1 egg, 1 tablespoon of peanut butter, and ½ cup of tofu. How can you eat out and still eat healthy? · Learn to estimate the serving sizes of foods that have carbohydrate. If you measure food at home, it will be easier to estimate the amount in a serving of restaurant food. · If the meal you order has too much carbohydrate (such as potatoes, corn, or baked beans), ask to have a low-carbohydrate food instead. Ask for a salad or green vegetables. · If you use insulin, check your blood sugar before and after eating out to help you plan how much to eat in the future. · If you eat more carbohydrate at a meal than you had planned, take a walk or do other exercise.  This will help lower your blood sugar. What else should you know? · Limit saturated fat, such as the fat from meat and dairy products. This is a healthy choice because people who have diabetes are at higher risk of heart disease. So choose lean cuts of meat and nonfat or low-fat dairy products. Use olive or canola oil instead of butter or shortening when cooking. · Don't skip meals. Your blood sugar may drop too low if you skip meals and take insulin or certain medicines for diabetes. · Check with your doctor before you drink alcohol. Alcohol can cause your blood sugar to drop too low. Alcohol can also cause a bad reaction if you take certain diabetes medicines. Follow-up care is a key part of your treatment and safety. Be sure to make and go to all appointments, and call your doctor if you are having problems. It's also a good idea to know your test results and keep a list of the medicines you take. Where can you learn more? Go to https://enercastpeModern Meadow.GateGuru. org and sign in to your [employer] account. Enter K129 in the TextRecruit box to learn more about \"Learning About Diabetes Food Guidelines. \"     If you do not have an account, please click on the \"Sign Up Now\" link. Current as of: December 20, 2019               Content Version: 12.5  © 4051-2506 Healthwise, Incorporated. Care instructions adapted under license by [facility] ([facility]). If you have questions about a medical condition or this instruction, always ask your healthcare professional. [place] [address] any warranty or liability for your use of this information. Patient Education        High Cholesterol: Care Instructions  Your Care Instructions     Cholesterol is a type of fat in your blood. It is needed for many body functions, such as making new cells. Cholesterol is made by your body. It also comes from food you eat. High cholesterol means that you have too much of the fat in your blood.  This raises your risk of a heart attack and stroke. LDL and HDL are part of your total cholesterol. LDL is the \"bad\" cholesterol. High LDL can raise your risk for heart disease, heart attack, and stroke. HDL is the \"good\" cholesterol. It helps clear bad cholesterol from the body. High HDL is linked with a lower risk of heart disease, heart attack, and stroke. Your cholesterol levels help your doctor find out your risk for having a heart attack or stroke. You and your doctor can talk about whether you need to lower your risk and what treatment is best for you. A heart-healthy lifestyle along with medicines can help lower your cholesterol and your risk. The way you choose to lower your risk will depend on how high your risk is for heart attack and stroke. It will also depend on how you feel about taking medicines. Follow-up care is a key part of your treatment and safety. Be sure to make and go to all appointments, and call your doctor if you are having problems. It's also a good idea to know your test results and keep a list of the medicines you take. How can you care for yourself at home? · Eat a variety of foods every day. Good choices include fruits, vegetables, whole grains (like oatmeal), dried beans and peas, nuts and seeds, soy products (like tofu), and fat-free or low-fat dairy products. · Replace butter, margarine, and hydrogenated or partially hydrogenated oils with olive and canola oils. (Canola oil margarine without trans fat is fine.)  · Replace red meat with fish, poultry, and soy protein (like tofu). · Limit processed and packaged foods like chips, crackers, and cookies. · Bake, broil, or steam foods. Don't stephen them. · Be physically active. Get at least 30 minutes of exercise on most days of the week. Walking is a good choice. You also may want to do other activities, such as running, swimming, cycling, or playing tennis or team sports.   · Stay at a healthy weight or lose weight by making the changes in eating and physical activity listed above. Losing just a small amount of weight, even 5 to 10 pounds, can reduce your risk for having a heart attack or stroke. · Do not smoke. When should you call for help? Watch closely for changes in your health, and be sure to contact your doctor if:  · You need help making lifestyle changes. · You have questions about your medicine. Where can you learn more? Go to https://PressgluepeMirage Endoscopy Centereweb.Innotas. org and sign in to your Attendify account. Enter P982 in the Lumenz box to learn more about \"High Cholesterol: Care Instructions. \"     If you do not have an account, please click on the \"Sign Up Now\" link. Current as of: December 16, 2019               Content Version: 12.5  © 9899-3771 Healthwise, Incorporated. Care instructions adapted under license by [facility] ([facility]). If you have questions about a medical condition or this instruction, always ask your healthcare professional. [place] [address] any warranty or liability for your use of this information.

## 2020-09-10 NOTE — PROGRESS NOTES
seizures, syncope, weakness, numbness and headaches. Hematological: Negative for adenopathy. Psychiatric/Behavioral: Negative for agitation, confusion, dysphoric mood and sleep disturbance. The patient is not nervous/anxious. Prior to Visit Medications    Medication Sig Taking? Authorizing Provider   ALPRAZolam Kenneth Lash) 1 MG tablet Take 1 tablet by mouth nightly as needed for Sleep for up to 90 days. Yes Armando Geiger MD   cyanocobalamin 1000 MCG/ML injection INJECT 1 ML INTO THE MUSCLE FOR 1 DOSE Yes Armando Geiger MD   Dulaglutide (TRULICITY) 7.16 CS/5.9FR SOPN Inject 0.75 mg into the skin once a week Yes JAMEL Noriega CNP   hydroCHLOROthiazide (HYDRODIURIL) 25 MG tablet TAKE 1 TABLET BY MOUTH EVERY DAY Yes JAMEL Nichole CNP   Insulin Degludec (TRESIBA FLEXTOUCH) 100 UNIT/ML SOPN Inject 45 Units into the skin nightly Yes JAMEL Noriega CNP   metFORMIN (GLUCOPHAGE-XR) 500 MG extended release tablet Take 4 tablets by mouth daily (with breakfast) Yes JAMEL Oliva CNP   sertraline (ZOLOFT) 50 MG tablet Take 1 tablet by mouth daily Yes JAMEL Nichole CNP   sertraline (ZOLOFT) 25 MG tablet Take 1 tablet by mouth daily Yes JAMEL Nichole CNP   blood glucose test strips (ASCENSIA AUTODISC VI;ONE TOUCH ULTRA TEST VI) strip Inject 1 each into the skin 4 times daily As needed. One Touch Ultra test strips. Yes JAMEL Nichole CNP   Insulin Pen Needle (ULTICARE MINI PEN NEEDLES) 31G X 6 MM MISC USE 1 DAILY AS DIRECTED Yes Armando Geiger MD   aspirin 81 MG tablet Take 81 mg by mouth daily Yes Historical Provider, MD   Cholecalciferol (VITAMIN D3) 5000 UNITS CAPS Take 5,000 Int'l Units by mouth daily.    Yes Historical Provider, MD   Insulin Glargine, 1 Unit Dial, (TOUJEO SOLOSTAR) 300 UNIT/ML SOPN INJECT 45 UNITS INTO THE SKIN NIGHTLY  Patient not taking: Reported on 9/10/2020  JAMEL Noriega CNP   rosuvastatin (CRESTOR) 10 MG tablet Take 10 mg by mouth daily  Historical Provider, MD       Social History     Tobacco Use    Smoking status: Never Smoker    Smokeless tobacco: Never Used   Substance Use Topics    Alcohol use: Yes     Alcohol/week: 0.0 standard drinks     Comment: SOCIALLY    Drug use: No        Allergies   Allergen Reactions    Codeine Hives and Nausea And Vomiting    Vicodin [Hydrocodone-Acetaminophen] Nausea And Vomiting   ,   Past Medical History:   Diagnosis Date    Anxiety     B12 deficiency     Depression 9/12/2013    Diabetes mellitus type 2, uncontrolled ([address] 5/9/2012    Edema of hand     Edema of lower extremity     Hepatitis     Hyperglycemia     Hyperlipidemia     Hypertension     Insomnia     Obesity     PUD (peptic ulcer disease)     Vertigo     Vitamin D insufficiency    ,   Social History     Tobacco Use    Smoking status: Never Smoker    Smokeless tobacco: Never Used   Substance Use Topics    Alcohol use:  Yes     Alcohol/week: 0.0 standard drinks     Comment: SOCIALLY    Drug use: No   ,   Family History   Problem Relation Age of Onset    High Cholesterol Mother     Heart Disease Father     High Blood Pressure Father     Diabetes Father     High Cholesterol Father     Thyroid Disease Sister     Cancer Maternal Aunt         breast    Heart Disease Paternal Uncle     Cancer Maternal Grandmother         breast       PHYSICAL EXAMINATION:  [ INSTRUCTIONS:  \"[x]\" Indicates a positive item  \"[]\" Indicates a negative item  -- DELETE ALL ITEMS NOT EXAMINED]  Vital Signs: (As obtained by patient/caregiver or practitioner observation)    Blood pressure-  Heart rate-    Respiratory rate-    Temperature-  Pulse oximetry-     Constitutional: [] Appears well-developed and well-nourished [] No apparent distress      [] Abnormal-   Mental status  [] Alert and awake  [] Oriented to person/place/time []Able to follow commands      Eyes:  EOM    []  Normal  [] Abnormal-  Sclera  []  Normal  [] Abnormal - Discharge []  None visible  [] Abnormal -    HENT:   [] Normocephalic, atraumatic. [] Abnormal   [] Mouth/Throat: Mucous membranes are moist.     External Ears [] Normal  [] Abnormal-     Neck: [] No visualized mass     Pulmonary/Chest: [] Respiratory effort normal.  [] No visualized signs of difficulty breathing or respiratory distress        [] Abnormal-      Musculoskeletal:   [] Normal gait with no signs of ataxia         [] Normal range of motion of neck        [] Abnormal-       Neurological:        [] No Facial Asymmetry (Cranial nerve 7 motor function) (limited exam to video visit)          [] No gaze palsy        [] Abnormal-         Skin:        [] No significant exanthematous lesions or discoloration noted on facial skin         [] Abnormal-            Psychiatric:       [] Normal Affect [] No Hallucinations        [] Abnormal-     Other pertinent observable physical exam findings-     ASSESSMENT/PLAN:  1. Diabetes mellitus, uncontrolled type 2 without complication, with long-term current use of insulin ([address]  Better controlled since addition of Trulicity. Continue current medicines. Continue healthy lifestyle changes (diet/exercise/attempt weight loss). Get fasting labs and urine test soon. Follow up with Endo as scheduled. - Comprehensive Metabolic Panel; Future  - Lipid Panel; Future  - TSH with Reflex; Future  - Microalbumin / Creatinine Urine Ratio; Future  - Hemoglobin A1C; Future    2. Hypertension, Essential  Well controlled per pt report. Continue current medicines. Continue healthy lifestyle changes (diet/exercise/attempt weight loss). Labs as below. - CBC Auto Differential; Future  - Comprehensive Metabolic Panel; Future    3. Hyperlipidemia, Mixed  Hopefully improving with pt's lifestyle changes and weight loss. Has not had lipids checked since 7/2019. Recommended fasting labs soon as below. Continue healthy lifestyle changes (diet/exercise/attempt weight loss).    -

## 2020-10-21 ENCOUNTER — HOSPITAL ENCOUNTER (OUTPATIENT)
Age: 50
Discharge: HOME | End: 2020-10-21
Payer: COMMERCIAL

## 2020-10-21 LAB
A/G RATIO: 1.7 (ref 1.1–2.2)
ALBUMIN SERPL-MCNC: 4.3 G/DL (ref 3.4–5)
ALP BLD-CCNC: 82 U/L (ref 40–129)
ALT SERPL-CCNC: 11 U/L (ref 10–40)
ANION GAP SERPL CALCULATED.3IONS-SCNC: 14 MMOL/L (ref 3–16)
AST SERPL-CCNC: 18 U/L (ref 15–37)
BASOPHILS ABSOLUTE: 0 K/UL (ref 0–0.2)
BASOPHILS RELATIVE PERCENT: 0.6 %
BILIRUB SERPL-MCNC: <0.2 MG/DL (ref 0–1)
BUN BLDV-MCNC: 12 MG/DL (ref 7–20)
CALCIUM SERPL-MCNC: 9.8 MG/DL (ref 8.3–10.6)
CHLORIDE BLD-SCNC: 99 MMOL/L (ref 99–110)
CHOLESTEROL, TOTAL: 239 MG/DL (ref 0–199)
CO2: 30 MMOL/L (ref 21–32)
CREAT SERPL-MCNC: 0.6 MG/DL (ref 0.6–1.1)
CREATININE URINE: 73.6 MG/DL (ref 28–259)
EOSINOPHILS ABSOLUTE: 0.1 K/UL (ref 0–0.6)
EOSINOPHILS RELATIVE PERCENT: 1.2 %
GFR AFRICAN AMERICAN: >60
GFR NON-AFRICAN AMERICAN: >60
GLOBULIN: 2.6 G/DL
GLUCOSE BLD-MCNC: 159 MG/DL (ref 70–99)
HCT VFR BLD CALC: 41.4 % (ref 36–48)
HDLC SERPL-MCNC: 64 MG/DL (ref 40–60)
HEMOGLOBIN: 14.1 G/DL (ref 12–16)
LDL CHOLESTEROL CALCULATED: 150 MG/DL
LYMPHOCYTES ABSOLUTE: 1.9 K/UL (ref 1–5.1)
LYMPHOCYTES RELATIVE PERCENT: 23.5 %
MCH RBC QN AUTO: 29.8 PG (ref 26–34)
MCHC RBC AUTO-ENTMCNC: 34 G/DL (ref 31–36)
MCV RBC AUTO: 87.5 FL (ref 80–100)
MICROALBUMIN UR-MCNC: <1.2 MG/DL
MICROALBUMIN/CREAT UR-RTO: NORMAL MG/G (ref 0–30)
MONOCYTES ABSOLUTE: 0.6 K/UL (ref 0–1.3)
MONOCYTES RELATIVE PERCENT: 7.6 %
NEUTROPHILS ABSOLUTE: 5.4 K/UL (ref 1.7–7.7)
NEUTROPHILS RELATIVE PERCENT: 67.1 %
PDW BLD-RTO: 13.1 % (ref 12.4–15.4)
PLATELET # BLD: 299 K/UL (ref 135–450)
PMV BLD AUTO: 8.1 FL (ref 5–10.5)
POTASSIUM SERPL-SCNC: 3.8 MMOL/L (ref 3.5–5.1)
RBC # BLD: 4.73 M/UL (ref 4–5.2)
SODIUM BLD-SCNC: 143 MMOL/L (ref 136–145)
TOTAL PROTEIN: 6.9 G/DL (ref 6.4–8.2)
TRIGL SERPL-MCNC: 124 MG/DL (ref 0–150)
TSH REFLEX: 3.1 UIU/ML (ref 0.27–4.2)
VLDLC SERPL CALC-MCNC: 25 MG/DL
WBC # BLD: 8.1 K/UL (ref 4–11)

## 2020-10-21 PROCEDURE — 82570 ASSAY OF URINE CREATININE: CPT

## 2020-10-21 PROCEDURE — 80061 LIPID PANEL: CPT

## 2020-10-21 PROCEDURE — 36415 COLL VENOUS BLD VENIPUNCTURE: CPT

## 2020-10-21 PROCEDURE — 82043 UR ALBUMIN QUANTITATIVE: CPT

## 2020-10-21 PROCEDURE — 84443 ASSAY THYROID STIM HORMONE: CPT

## 2020-10-21 PROCEDURE — 83036 HEMOGLOBIN GLYCOSYLATED A1C: CPT

## 2020-10-21 PROCEDURE — 85025 COMPLETE CBC W/AUTO DIFF WBC: CPT

## 2020-10-21 PROCEDURE — 80053 COMPREHEN METABOLIC PANEL: CPT

## 2020-10-21 RX ORDER — CYANOCOBALAMIN 1000 UG/ML
INJECTION INTRAMUSCULAR; SUBCUTANEOUS
Qty: 1 ML | Refills: 11 | OUTPATIENT
Start: 2020-10-21

## 2020-10-21 NOTE — TELEPHONE ENCOUNTER
LAST FILLED ON 9-8-2020 11 rf.   [phone] (home) [phone] (work)  Lm FOR PT TO CONTACT PHARMACY TO FILL.

## 2020-10-22 LAB
ESTIMATED AVERAGE GLUCOSE: 171.4 MG/DL
HBA1C MFR BLD: 7.6 %

## 2020-11-06 RX ORDER — METFORMIN HYDROCHLORIDE 500 MG/1
2000 TABLET, EXTENDED RELEASE ORAL
Qty: 360 TABLET | Refills: 1 | Status: SHIPPED | OUTPATIENT
Start: 2020-11-06 | End: 2021-02-11

## 2020-11-06 RX ORDER — HYDROCHLOROTHIAZIDE 25 MG/1
TABLET ORAL
Qty: 90 TABLET | Refills: 0 | Status: SHIPPED | OUTPATIENT
Start: 2020-11-06 | End: 2021-02-02

## 2020-11-06 NOTE — TELEPHONE ENCOUNTER
Refill request for METFORMIN medication.      Name of Pharmacy- [facility]      Last visit - 9/10/20     Pending visit - NONE    Last refill -8/12/20      Medication Contract signed -   Last Oarrs ran-         Additional Comments

## 2020-11-06 NOTE — TELEPHONE ENCOUNTER
Refill request for hctz  medication.      Name of Pharmacy- [facility]       Last visit - 9-     Pending visit - 11-    Last refill -8-      Medication Contract signed -   Liborio contreras-         Additional Comments

## 2020-12-10 RX ORDER — ALPRAZOLAM 1 MG/1
1 TABLET ORAL NIGHTLY PRN
Qty: 30 TABLET | Refills: 2 | Status: SHIPPED | OUTPATIENT
Start: 2020-12-10 | End: 2021-03-08 | Stop reason: SDUPTHER

## 2020-12-10 NOTE — TELEPHONE ENCOUNTER
Refill request for ALPRAZOLAM medication.      Name of [facility]- [facility]      Last visit - 9/10/20     Pending visit - NONE    Last refill -9/10/20      Medication Contract signed -   Last Oarrs ran-         Additional Comments

## 2020-12-10 NOTE — TELEPHONE ENCOUNTER
RF sent. Controlled Substance Monitoring:    Acute and Chronic Pain Monitoring:   RX Monitoring 12/10/2020   Attestation -   Periodic Controlled Substance Monitoring No signs of potential drug abuse or diversion identified.

## 2021-01-18 DIAGNOSIS — E11.9 DM TYPE 2 (DIABETES MELLITUS, TYPE 2) (HCC): ICD-10-CM

## 2021-01-19 RX ORDER — DULAGLUTIDE 0.75 MG/.5ML
0.75 INJECTION, SOLUTION SUBCUTANEOUS WEEKLY
Qty: 12 PEN | Refills: 1 | Status: SHIPPED | OUTPATIENT
Start: 2021-01-19 | End: 2021-02-24

## 2021-02-02 RX ORDER — SERTRALINE HYDROCHLORIDE 25 MG/1
TABLET, FILM COATED ORAL
Qty: 90 TABLET | Refills: 3 | Status: SHIPPED | OUTPATIENT
Start: 2021-02-02 | End: 2021-02-23

## 2021-02-02 NOTE — TELEPHONE ENCOUNTER
Refill request for sertraline medication.      Name of Pharmacy- [facility]      Last visit - 9/10/2020     Pending visit - 2/23/2021    Last refill -2/13/2020  3 refills

## 2021-02-11 RX ORDER — METFORMIN HYDROCHLORIDE 500 MG/1
2000 TABLET, EXTENDED RELEASE ORAL
Qty: 360 TABLET | Refills: 1 | Status: SHIPPED | OUTPATIENT
Start: 2021-02-11 | End: 2021-04-24 | Stop reason: SDUPTHER

## 2021-02-11 NOTE — TELEPHONE ENCOUNTER
Refill request for metformin  medication.      Name of Pharmacy- [facility]       Last visit - 9-     Pending visit - 2-    Last refill -11-6-2020      Medication Contract signed -   Liborio contreras-         Additional Comments

## 2021-02-23 ENCOUNTER — OFFICE VISIT (OUTPATIENT)
Dept: INTERNAL MEDICINE CLINIC | Age: 51
End: 2021-02-23
Payer: COMMERCIAL

## 2021-02-23 VITALS
WEIGHT: 210 LBS | SYSTOLIC BLOOD PRESSURE: 128 MMHG | TEMPERATURE: 98.3 F | HEIGHT: 64 IN | OXYGEN SATURATION: 98 % | BODY MASS INDEX: 35.85 KG/M2 | HEART RATE: 82 BPM | DIASTOLIC BLOOD PRESSURE: 82 MMHG

## 2021-02-23 DIAGNOSIS — Z01.818 PRE-OP EVALUATION: ICD-10-CM

## 2021-02-23 DIAGNOSIS — E11.9 TYPE 2 DIABETES MELLITUS WITHOUT COMPLICATION, WITHOUT LONG-TERM CURRENT USE OF INSULIN (HCC): Primary | ICD-10-CM

## 2021-02-23 DIAGNOSIS — I10 ESSENTIAL HYPERTENSION: ICD-10-CM

## 2021-02-23 DIAGNOSIS — Z41.1 ENCOUNTER FOR BREAST AUGMENTATION: ICD-10-CM

## 2021-02-23 PROCEDURE — 93000 ELECTROCARDIOGRAM COMPLETE: CPT | Performed by: FAMILY MEDICINE

## 2021-02-23 PROCEDURE — 99243 OFF/OP CNSLTJ NEW/EST LOW 30: CPT | Performed by: FAMILY MEDICINE

## 2021-02-23 ASSESSMENT — PATIENT HEALTH QUESTIONNAIRE - PHQ9
SUM OF ALL RESPONSES TO PHQ QUESTIONS 1-9: 0
2. FEELING DOWN, DEPRESSED OR HOPELESS: 0
SUM OF ALL RESPONSES TO PHQ QUESTIONS 1-9: 0
1. LITTLE INTEREST OR PLEASURE IN DOING THINGS: 0

## 2021-02-23 NOTE — PROGRESS NOTES
Preoperative Consultation      Mark Patel  YOB: 1970    Date of Service:  2/23/2021    Vitals:    02/23/21 0852   BP: 128/82   Site: Right Upper Arm   Position: Sitting   Cuff Size: Medium Adult   Pulse: 82   Temp: 98.3 °F (36.8 °C)   TempSrc: Infrared   SpO2: 98%   Weight: 210 lb (95.3 kg)   Height: 5' 3.5\" (1.613 m)      Wt Readings from Last 2 Encounters:   02/23/21 210 lb (95.3 kg)   02/13/20 217 lb (98.4 kg)     BP Readings from Last 3 Encounters:   02/23/21 128/82   02/13/20 130/84   10/02/19 136/78        Chief Complaint   Patient presents with    Pre-op Exam     Breast augmentation on 3/4/2021 with Dr. Tiara Isaac at [address] Allergies   Allergen Reactions    Codeine Hives and Nausea And Vomiting    Vicodin [Hydrocodone-Acetaminophen] Nausea And Vomiting     Outpatient Medications Marked as Taking for the 2/23/21 encounter (Office Visit) with Moreno De Guzman MD   Medication Sig Dispense Refill    metFORMIN (GLUCOPHAGE-XR) 500 MG extended release tablet TAKE 4 TABLETS BY MOUTH DAILY (WITH BREAKFAST) 360 tablet 1    hydroCHLOROthiazide (HYDRODIURIL) 25 MG tablet TAKE 1 TABLET BY MOUTH EVERY DAY 90 tablet 1    sertraline (ZOLOFT) 50 MG tablet TAKE 1 TABLET BY MOUTH EVERY DAY 90 tablet 3    Dulaglutide (TRULICITY) 8.79 UT/3.6WJ SOPN Inject 0.75 mg into the skin once a week 12 pen 1    ALPRAZolam (XANAX) 1 MG tablet Take 1 tablet by mouth nightly as needed for Sleep for up to 90 days. 30 tablet 2    cyanocobalamin 1000 MCG/ML injection INJECT 1 ML INTO THE MUSCLE FOR 1 DOSE 1 mL 11    blood glucose test strips (ASCENSIA AUTODISC VI;ONE TOUCH ULTRA TEST VI) strip Inject 1 each into the skin 4 times daily As needed. One Touch Ultra test strips.  150 each 11    Insulin Pen Needle (ULTICARE MINI PEN NEEDLES) 31G X 6 MM MISC USE 1 DAILY AS DIRECTED 100 each 5    aspirin 81 MG tablet Take 81 mg by mouth daily  Cholecalciferol (VITAMIN D3) 5000 UNITS CAPS Take 5,000 Int'l Units by mouth daily. This patient presents to the office today for a preoperative consultation at the request of surgeon, Dr. Amanda Sifuentes, who plans on performing bilateral breast augmentation on March 4 at [facility]. The current problem began several years ago, and symptoms have been stable with time. Conservative therapy: N/A.     Planned anesthesia: General   Known anesthesia problems: None   Bleeding risk: No recent or remote history of abnormal bleeding  Personal or FH of DVT/PE: No    Patient objection to receiving blood products: No    Patient Active Problem List   Diagnosis    AMANDA (generalized anxiety disorder)    Insomnia    Hypertension, Essential    Edema    PUD (peptic ulcer disease)    Obesity    Vertigo    Hyperlipidemia, Mixed    Type 2 diabetes mellitus, without long-term current use of insulin (HCC)    B12 deficiency    Depression    OCD (obsessive compulsive disorder)    Fatigue    Cholelithiasis    Hepatitis    Autoimmune hepatitis ([address]    Leg edema    Vitamin D deficiency    Hashimoto's thyroiditis    Goiter    H/O total hysterectomy       Past Medical History:   Diagnosis Date    Anxiety     B12 deficiency     Depression 9/12/2013    Diabetes mellitus type 2, uncontrolled ([address] 5/9/2012    Edema of hand     Edema of lower extremity     Hepatitis     Hyperglycemia     Hyperlipidemia     Hypertension     Insomnia     Obesity     PUD (peptic ulcer disease)     Vertigo     Vitamin D insufficiency      Past Surgical History:   Procedure Laterality Date    BLADDER SURGERY  2000    bladder suspension    HYSTERECTOMY, TOTAL ABDOMINAL  12/04    cervix absent, ovaries intact    VENTRAL HERNIA REPAIR  10/07     Family History   Problem Relation Age of Onset    High Cholesterol Mother     Heart Disease Father     High Blood Pressure Father     Diabetes Father  High Cholesterol Father     Thyroid Disease Sister     Cancer Maternal Aunt         breast    Heart Disease Paternal Uncle     Cancer Maternal Grandmother         breast     Social History     Socioeconomic History    Marital status:      Spouse name: Liseth Arrington    Number of children: 2    Years of education: college    Highest education level: Not on file   Occupational History    Occupation: Homemaker   Social Needs    Financial resource strain: Not on file    Food insecurity     Worry: Not on file     Inability: Not on file   [employer] needs     Medical: Not on file     Non-medical: Not on file   Tobacco Use    Smoking status: Never Smoker    Smokeless tobacco: Never Used   Substance and Sexual Activity    Alcohol use: Yes     Alcohol/week: 0.0 standard drinks     Comment: SOCIALLY    Drug use: No    Sexual activity: Yes     Partners: Male   Lifestyle    Physical activity     Days per week: Not on file     Minutes per session: Not on file    Stress: Not on file   Relationships    Social connections     Talks on phone: Not on file     Gets together: Not on file     Attends [employer] service: Not on file     Active member of club or organization: Not on file     Attends meetings of clubs or organizations: Not on file     Relationship status: Not on file    Intimate partner violence     Fear of current or ex partner: Not on file     Emotionally abused: Not on file     Physically abused: Not on file     Forced sexual activity: Not on file   Other Topics Concern    Not on file   Social History Narrative    Not on file       Review of Systems  A comprehensive review of systems was negative except for what was noted in the HPI. Physical Exam   Constitutional: She is oriented to person, place, and time. She appears well-developed and well-nourished. No distress. HENT:   Head: Normocephalic and atraumatic.  Anion Gap 10/21/2020 14     Glucose 10/21/2020 159*    BUN 10/21/2020 12     CREATININE 10/21/2020 0.6     GFR Non- 10/21/2020 >60     GFR  10/21/2020 >60     Calcium 10/21/2020 9.8     Total Protein 10/21/2020 6.9     Albumin 10/21/2020 4.3     Albumin/Globulin Ratio 10/21/2020 1.7     Total Bilirubin 10/21/2020 <0.2     Alkaline Phosphatase 10/21/2020 82     ALT 10/21/2020 11     AST 10/21/2020 18     Globulin 10/21/2020 2.6     WBC 10/21/2020 8.1     RBC 10/21/2020 4.73     Hemoglobin 10/21/2020 14.1     Hematocrit 10/21/2020 41.4     MCV 10/21/2020 87.5     MCH 10/21/2020 29.8     MCHC 10/21/2020 34.0     RDW 10/21/2020 13.1     Platelets 92/44/5465 299     MPV 10/21/2020 8.1     Neutrophils % 10/21/2020 67.1     Lymphocytes % 10/21/2020 23.5     Monocytes % 10/21/2020 7.6     Eosinophils % 10/21/2020 1.2     Basophils % 10/21/2020 0.6     Neutrophils Absolute 10/21/2020 5.4     Lymphocytes Absolute 10/21/2020 1.9     Monocytes Absolute 10/21/2020 0.6     Eosinophils Absolute 10/21/2020 0.1     Basophils Absolute 10/21/2020 0.0            Assessment:       44 y.o. patient with planned surgery as above. Known risk factors for perioperative complications: Diabetes mellitus, Hypertension  Current medications which may produce withdrawal symptoms if withheld perioperatively: none      Plan:     1. Preoperative workup as follows: ECG  2. Change in medication regimen before surgery: Discontinue ASA 4 days before surgery  3.  Prophylaxis for cardiac events with perioperative beta-blockers: Not indicated  ACC/AHA indications for pre-operative beta-blocker use:    · Vascular surgery with history of postitive stress test  · Intermediate or high risk surgery with history of CAD · Intermediate or high risk surgery with multiple clinical predictors of CAD- 2 of the following: history of compensated or prior heart failure, history of cerebrovascular disease, DM, or renal insufficiency    Routine administration of higher-dose, long-acting metoprolol in beta-blockernaïve patients on the day of surgery, and in the absence of dose titration is associated with an overall increase in mortality. Beta-blockers should be started days to weeks prior to surgery and titrated to pulse < 70.  4. Deep vein thrombosis prophylaxis: regimen to be chosen by surgical team  5.  No contraindications to planned surgery

## 2021-02-23 NOTE — PATIENT INSTRUCTIONS
Medically cleared for surgery on 3/4/21. Continue current medicines. Stop aspirin 4 days prior to surgery per surgeon. Continue healthy lifestyle changes (diet/exercise/attempt weight loss). Return in about 2 months (around 4/23/2021) for Fasting recheck DM, BP, lipids, anxiety, foot exam.      Patient Education        Learning About Meal Planning for Diabetes  Why plan your meals? Meal planning can be a key part of managing diabetes. Planning meals and snacks with the right balance of carbohydrate, protein, and fat can help you keep your blood sugar at the target level you set with your doctor. You don't have to eat special foods. You can eat what your family eats, including sweets once in a while. But you do have to pay attention to how often you eat and how much you eat of certain foods. You may want to work with a dietitian or a certified diabetes educator. He or she can give you tips and meal ideas and can answer your questions about meal planning. This health professional can also help you reach a healthy weight if that is one of your goals. What plan is right for you? Your dietitian or diabetes educator may suggest that you start with the plate format or carbohydrate counting. The plate format  The plate format is a simple way to help you manage how you eat. You plan meals by learning how much space each food should take on a plate. Using the plate format helps you spread carbohydrate throughout the day. It can make it easier to keep your blood sugar level within your target range. It also helps you see if you're eating healthy portion sizes. To use the plate format, you put non-starchy vegetables on half your plate. Add meat or meat substitutes on one-quarter of the plate. Put a grain or starchy vegetable (such as brown rice or a potato) on the final quarter of the plate. You can add a small piece of fruit and some low-fat or fat-free milk or yogurt, depending on your carbohydrate goal for each meal.  Here are some tips for using the plate format:  · Make sure that you are not using an oversized plate. A 9-inch plate is best. Many restaurants use larger plates. · Get used to using the plate format at home. Then you can use it when you eat out. · Write down your questions about using the plate format. Talk to your doctor, a dietitian, or a diabetes educator about your concerns. Carbohydrate counting  With carbohydrate counting, you plan meals based on the amount of carbohydrate in each food. Carbohydrate raises blood sugar higher and more quickly than any other nutrient. It is found in desserts, breads and cereals, and fruit. It's also found in starchy vegetables such as potatoes and corn, grains such as rice and pasta, and milk and yogurt. Spreading carbohydrate throughout the day helps keep your blood sugar levels within your target range. Your daily amount depends on several things, including your weight, how active you are, which diabetes medicines you take, and what your goals are for your blood sugar levels. A registered dietitian or diabetes educator can help you plan how much carbohydrate to include in each meal and snack. A guideline for your daily amount of carbohydrate is:  · 45 to 60 grams at each meal. That's about the same as 3 to 4 carbohydrate servings. · 15 to 20 grams at each snack. That's about the same as 1 carbohydrate serving. · Make sure you have the ingredients you need for your recipes. If you're running low on basic items, put these items on your shopping list too. · List foods that you use to make breakfasts, lunches, and snacks. List plenty of fruits and vegetables. · Post this list on the refrigerator. Add to it as you think of more things you need. · Take the list to the store to do your weekly shopping. Follow-up care is a key part of your treatment and safety. Be sure to make and go to all appointments, and call your doctor if you are having problems. It's also a good idea to know your test results and keep a list of the medicines you take. Where can you learn more? Go to https://[employer] org and sign in to your [employer] account. Enter T492 in the [employer] box to learn more about \"Learning About Meal Planning for Diabetes. \"     If you do not have an account, please click on the \"Sign Up Now\" link. Current as of: December 20, 2019               Content Version: 12.6  © 2953-6933 [employer]. Care instructions adapted under license by [facility] ([facility]). If you have questions about a medical condition or this instruction, always ask your healthcare professional. [address]  any warranty or liability for your use of this information.

## 2021-02-24 ENCOUNTER — OFFICE VISIT (OUTPATIENT)
Dept: ENDOCRINOLOGY | Age: 51
End: 2021-02-24
Payer: COMMERCIAL

## 2021-02-24 VITALS
BODY MASS INDEX: 35.85 KG/M2 | SYSTOLIC BLOOD PRESSURE: 126 MMHG | WEIGHT: 210 LBS | HEIGHT: 64 IN | OXYGEN SATURATION: 98 % | HEART RATE: 89 BPM | DIASTOLIC BLOOD PRESSURE: 87 MMHG

## 2021-02-24 DIAGNOSIS — E55.9 VITAMIN D DEFICIENCY: ICD-10-CM

## 2021-02-24 DIAGNOSIS — E78.2 MIXED HYPERLIPIDEMIA: ICD-10-CM

## 2021-02-24 DIAGNOSIS — I10 ESSENTIAL HYPERTENSION: ICD-10-CM

## 2021-02-24 DIAGNOSIS — E11.9 TYPE 2 DIABETES MELLITUS WITHOUT COMPLICATION, WITHOUT LONG-TERM CURRENT USE OF INSULIN (HCC): Primary | ICD-10-CM

## 2021-02-24 LAB — HBA1C MFR BLD: 7.5 %

## 2021-02-24 PROCEDURE — 99213 OFFICE O/P EST LOW 20 MIN: CPT | Performed by: NURSE PRACTITIONER

## 2021-02-24 PROCEDURE — 3051F HG A1C>EQUAL 7.0%<8.0%: CPT | Performed by: NURSE PRACTITIONER

## 2021-02-24 PROCEDURE — 83036 HEMOGLOBIN GLYCOSYLATED A1C: CPT | Performed by: NURSE PRACTITIONER

## 2021-02-24 RX ORDER — DULAGLUTIDE 1.5 MG/.5ML
1.5 INJECTION, SOLUTION SUBCUTANEOUS WEEKLY
Qty: 4 PEN | Refills: 3 | Status: SHIPPED | OUTPATIENT
Start: 2021-02-24 | End: 2021-06-28

## 2021-02-24 RX ORDER — MULTIVITAMIN,THER AND MINERALS
1 TABLET ORAL DAILY
COMMUNITY

## 2021-02-24 ASSESSMENT — ENCOUNTER SYMPTOMS
COLOR CHANGE: 0
SHORTNESS OF BREATH: 0
DIARRHEA: 0
NAUSEA: 0
CONSTIPATION: 0
EYE PAIN: 0

## 2021-02-24 NOTE — PROGRESS NOTES
Endocrinology  Carmelo Gifford CNP, [address] [facility] [address] [facility], [address] [place] [address] Ave  Phone [phone]  Fax [phone]    Caitlin Calhoun is a 44 y.o. female who is following up for management of Diabetes Mellitus Type 2. Last A1C:   Lab Results   Component Value Date    LABA1C 7.5 02/24/2021    LABA1C 7.6 10/21/2020    LABA1C 8.3 10/02/2019     Last BP Readings:  BP Readings from Last 3 Encounters:   02/24/21 126/87   02/23/21 128/82   02/13/20 130/84     Last LDL:   Lab Results   Component Value Date    LDLCALC 150 (H) 10/21/2020    LDLCHOLESTEROL 150 (H) 08/15/2017     Aspirin Use: 81 mg     Tobacco/Alcohol History:    Smoking status: Never Smoker    Smokeless tobacco: Never Used    Alcohol use: Yes     Alcohol/week: 0.0 oz     Comment: SOCIALLY     PMH includes  Past Medical History:   Diagnosis Date    Anxiety     B12 deficiency     Depression 9/12/2013    Diabetes mellitus type 2, uncontrolled ([address] 5/9/2012    Edema of hand     Edema of lower extremity     Hepatitis     Hyperglycemia     Hyperlipidemia     Hypertension     Insomnia     Obesity     PUD (peptic ulcer disease)     Vertigo     Vitamin D insufficiency      Cesar Coleman  has been diabetic for 5  years and on insulin for  5 years. Diagnosed after being on high dose prednisone for auto immune hepatitis, was in ED with DKA and BG > 600  Patient reports that diabetes is generally not well controlled. Disease course has been variable  Current microvascular complications include none  Current Macrovascular complications include no h/o CAD, PVD  Patient reports compliance for about 80% of the time and adheres to medication, but usually not to diet and exercise instructions. There have been no recent hospital or ED admissions for hypoglycemia, hyperglycemia or DKA  Sporadic follow up, no show x 3.     Blood glucose trends:  Patient brought log glucometer for download: no  Readings per day  <1 per patient report  Average reading 130-230--: 120-150  Lowest in past 2 weeks 130--> 98  Highest in past 2 weeks > 280--> 285  Hypoglycemia awareness and symptoms:  Has not done CGM--> [employer] applied previous visit, patient did not return for download as it fell off x 2    Current Medication regimen:   Trulicity 1.5 mg QW   Metformin XR 2000 mg QD    Other meds tried in past: novolog/humalog/lantus/levemir/toujeo  GFR > 60    Current Dietary regimen:   BF : special K bf sw/sausage/turkey/egg  Lunch : salad and chicken  Dinner: carbs/protein/vegetable  Snacks: none  Beverages: water and diet coke  Average carbs per day: >100 grams per day   weight    Microvascular Complications:  · Neuropathy: denies concerns  · Retinopathy: no concerns with vision, field of vision  · Nephropathy:  Component      Latest Ref Rng & Units 1/29/2019   Microalbumin, Random Urine      <2.0 mg/dL <1.20   Creatinine, Ur      28.0 - 259.0 mg/dL 65.4   Microalbumin Creatinine Ratio      0.0 - 30.0 mg/g see below     Diabetic Health Maintenance   · Last Eye Exam: > 1 year ago  · Last Foot exam: none  · Has patient seen a dietitian? Yes  · Current Exercise: No structured exercise  · On ACEI or ARB: none  · On statin: Crestor 10 mg    Hyperlipidemia: Current complaints include occasional myalgias but otherwise tolerates well.   Lab Results   Component Value Date    CHOL 239 10/21/2020    CHOL 281 07/31/2019    CHOL 279 01/29/2019     Lab Results   Component Value Date    TRIG 124 10/21/2020    TRIG 207 07/31/2019    TRIG 231 01/29/2019     Lab Results   Component Value Date    HDL 64 10/21/2020    HDL 70 07/31/2019    HDL 58 01/29/2019    HDL 62 05/10/2012     Lab Results   Component Value Date    LDLCHOLESTEROL 150 08/15/2017    LDLCHOLESTEROL 141 04/06/2017    LDLCHOLESTEROL 140 05/25/2016    LDLCALC 150 10/21/2020    LDLCALC 170 07/31/2019    LDLCALC 175 01/29/2019     No results found for: LDLDIRECT  No results found for: CHOLHDLRATIO    Vitamin D deficiency: Currently is on > 500 IU QD. Current complaints include fatigue on daily basis. Last vitamin Dlevel is:  Lab Results   Component Value Date    VITD25 29.4 01/29/2019    VITD25 30.0 08/15/2017    VITD25 31.6 05/12/2016     Hypertension  Currently on HCTZ 5 mg. Controlled , denies symptoms of dizziness, light headedness. Occasional dependent edema. Tries to follow a salt restricted diet. Lab Results   Component Value Date     10/21/2020    K 3.8 10/21/2020    CL 99 10/21/2020    CO2 30 10/21/2020    BUN 12 10/21/2020    CREATININE 0.6 10/21/2020    GLUCOSE 159 (H) 10/21/2020    CALCIUM 9.8 10/21/2020    PROT 6.9 10/21/2020    LABALBU 4.3 10/21/2020    BILITOT <0.2 10/21/2020    ALKPHOS 82 10/21/2020    AST 18 10/21/2020    ALT 11 10/21/2020    LABGLOM >60 10/21/2020    GFRAA >60 10/21/2020    AGRATIO 1.7 10/21/2020    GLOB 2.6 10/21/2020     The 10-year ASCVD risk score (Nereyda Saini et al., 2013) is: 3.9%    Values used to calculate the score:      Age: 44 years      Sex: Female      Is Non- : No      Diabetic: Yes      Tobacco smoker: No      Systolic Blood Pressure: 083 mmHg      Is BP treated: Yes      HDL Cholesterol: 64 mg/dL      Total Cholesterol: 239 mg/dL    Family History   Problem Relation Age of Onset    High Cholesterol Mother     Heart Disease Father     High Blood Pressure Father     Diabetes Father     High Cholesterol Father     Thyroid Disease Sister     Cancer Maternal Aunt         breast    Heart Disease Paternal Uncle     Cancer Maternal Grandmother         breast     Review of Systems   Constitutional: Negative for activity change, appetite change, diaphoresis, fever and unexpected weight change. HENT: Negative for dental problem. Eyes: Negative for pain and visual disturbance. Respiratory: Negative for shortness of breath. Cardiovascular: Negative for chest pain, palpitations and leg swelling. Gastrointestinal: Negative for constipation, diarrhea and nausea. Endocrine: Negative for cold intolerance, heat intolerance, polydipsia, polyphagia and polyuria. Genitourinary: Negative for frequency and urgency. Musculoskeletal: Negative for arthralgias, joint swelling and myalgias. Skin: Negative for color change and pallor. Neurological: Negative for weakness, numbness and headaches. Psychiatric/Behavioral: Negative for dysphoric mood and sleep disturbance. The patient is not nervous/anxious. Vitals:    02/24/21 1500   BP: 126/87   Pulse: 89   SpO2: 98%   Weight: 210 lb (95.3 kg)   Height: 5' 3.5\" (1.613 m)     Physical Exam  Vitals signs reviewed. Constitutional:       Appearance: She is well-developed. Eyes:      Pupils: Pupils are equal, round, and reactive to light. Neck:      Musculoskeletal: Normal range of motion. Thyroid: No thyromegaly. Cardiovascular:      Rate and Rhythm: Normal rate and regular rhythm. Heart sounds: Normal heart sounds. Pulmonary:      Effort: Pulmonary effort is normal.      Breath sounds: Normal breath sounds. Abdominal:      General: There is no distension. Musculoskeletal: Normal range of motion. Skin:     General: Skin is warm and dry. Comments: No skin changes or evidence of trauma. Neurological:      Mental Status: She is alert and oriented to person, place, and time. Sensory: No sensory deficit. Psychiatric:         Behavior: Behavior normal.         Thought Content: Thought content normal.       Skeletal foot exam: deferred. Assessment  Chaparrita Oliva is a 44 y.o. female with Diabetes Mellitus type 2 associated with HTN, HLD, obesity and elevated TSH levels  Sensor was applied at new patient visit, patient did not return for download as it fell off x 2.     Plan  Problem List Items Addressed This Visit     Hyperlipidemia, Mixed     LDL goal  < 100; recent at  TG not elevated at 124  Continue current regimen  Managed by PCP           Hypertension, Essential     Blood pressure controlled. Continue current regimen           Type 2 diabetes mellitus, without long-term current use of insulin ([facility] - Primary     Lab Results   Component Value Date    LABA1C 7.5 02/24/2021     Goal A1c  < 6.5%  Titrated off of Insulin  Increased dose of Trulicity to 1.5; tolerates well now  Avoid hypoglycemia  Discussed fasting and prandial goal ranges for BG     Diet :   30-40 grams per meal , 3 meals per day, avoid carbs in snack choices  Include moderate proteins and good fats   Ensure adequate hydration and  electrolyte replacement    Exercise :  Recommended exercise is 5-7 days a week for 30-60 mins at least, per day OR a total of 2.5 hours per week , which ever is more feasible. Diabetic Health Maintenance  Follow up with annual eye exams  Follow up with annual podiatry exams if needed  Reviewed sick day management of BG     Other areas of Diabetic Education reviewed:   Carbs: good carbs and bad carbs, importance of carb counting, incorporation of protein with each meal to reduce Glycemic index, importance of portions, Carb/insulin ratio   Fats: Good fats and bad fats, meal planning and supplements.  Discussed how food affects blood sugar readings.  Different diabetic medications   Managing high and low sugar readings   Rotation of sites for subcutaneous medication injection           Relevant Medications    Dulaglutide (TRULICITY) 1.5 TO/2.4FT SOPN    Other Relevant Orders    POCT glycosylated hemoglobin (Hb A1C) (Completed)    Vitamin D deficiency        Greater than 40 minutes spent directly counseling patient about topics listed above (such as lifestyle modifications, preventative screenings and/or disease related processes. Return in about 6 months (around 8/24/2021).

## 2021-02-24 NOTE — ASSESSMENT & PLAN NOTE
Lab Results   Component Value Date    LABA1C 7.5 02/24/2021     Goal A1c  < 6.5%  Titrated off of Insulin  Increased dose of Trulicity to 1.5; tolerates well now  Avoid hypoglycemia  Discussed fasting and prandial goal ranges for BG     Diet :   30-40 grams per meal , 3 meals per day, avoid carbs in snack choices  Include moderate proteins and good fats   Ensure adequate hydration and  electrolyte replacement    Exercise :  Recommended exercise is 5-7 days a week for 30-60 mins at least, per day OR a total of 2.5 hours per week , which ever is more feasible. Diabetic Health Maintenance  Follow up with annual eye exams  Follow up with annual podiatry exams if needed  Reviewed sick day management of BG     Other areas of Diabetic Education reviewed:  ? Carbs: good carbs and bad carbs, importance of carb counting, incorporation of protein with each meal to reduce Glycemic index, importance of portions, Carb/insulin ratio  ? Fats: Good fats and bad fats, meal planning and supplements. ? Discussed how food affects blood sugar readings. ? Different diabetic medications  ? Managing high and low sugar readings  ?  Rotation of sites for subcutaneous medication injection

## 2021-03-08 DIAGNOSIS — F41.1 GAD (GENERALIZED ANXIETY DISORDER): ICD-10-CM

## 2021-03-09 RX ORDER — CYANOCOBALAMIN 1000 UG/ML
INJECTION INTRAMUSCULAR; SUBCUTANEOUS
Qty: 1 ML | Refills: 11 | Status: SHIPPED | OUTPATIENT
Start: 2021-03-09 | End: 2021-08-08 | Stop reason: SDUPTHER

## 2021-03-09 RX ORDER — ALPRAZOLAM 1 MG/1
1 TABLET ORAL NIGHTLY PRN
Qty: 30 TABLET | Refills: 2 | Status: SHIPPED | OUTPATIENT
Start: 2021-03-09 | End: 2021-06-08

## 2021-03-09 RX ORDER — CYANOCOBALAMIN 1000 UG/ML
INJECTION INTRAMUSCULAR; SUBCUTANEOUS
Qty: 1 ML | Refills: 11 | Status: SHIPPED | OUTPATIENT
Start: 2021-03-09 | End: 2021-03-09 | Stop reason: SDUPTHER

## 2021-03-09 NOTE — TELEPHONE ENCOUNTER
Refill request for xanax medication.      Name of Pharmacy- [facility]      Last visit - 2/23/2021     Pending visit - 4/27/2021    Last refill -12/10/2020  2 refill      Medication Contract signed -6/18/15   Last Oarrs ran- 12/10/2020

## 2021-03-09 NOTE — TELEPHONE ENCOUNTER
Rx sent. Controlled Substance Monitoring:    Acute and Chronic Pain Monitoring:   RX Monitoring 3/9/2021   Attestation -   Periodic Controlled Substance Monitoring No signs of potential drug abuse or diversion identified.

## 2021-03-09 NOTE — TELEPHONE ENCOUNTER
Refill request for B12  medication.      Name of Pharmacy- [facility]      Last visit - 2/23/2021     Pending visit - 4/27/2021    Last refill -9/8/2020  11 refills

## 2021-03-20 ENCOUNTER — PATIENT MESSAGE (OUTPATIENT)
Dept: INTERNAL MEDICINE CLINIC | Age: 51
End: 2021-03-20

## 2021-03-22 RX ORDER — PANTOPRAZOLE SODIUM 40 MG/1
40 TABLET, DELAYED RELEASE ORAL
Qty: 90 TABLET | Refills: 1 | Status: SHIPPED | OUTPATIENT
Start: 2021-03-22 | End: 2022-01-13

## 2021-04-27 RX ORDER — METFORMIN HYDROCHLORIDE 500 MG/1
2000 TABLET, EXTENDED RELEASE ORAL
Qty: 360 TABLET | Refills: 1 | Status: SHIPPED | OUTPATIENT
Start: 2021-04-27 | End: 2021-11-05

## 2021-05-26 ENCOUNTER — TELEPHONE (OUTPATIENT)
Dept: FAMILY MEDICINE CLINIC | Age: 51
End: 2021-05-26

## 2021-05-26 DIAGNOSIS — E06.3 HASHIMOTO'S THYROIDITIS: ICD-10-CM

## 2021-05-26 DIAGNOSIS — E55.9 VITAMIN D DEFICIENCY: ICD-10-CM

## 2021-05-26 DIAGNOSIS — I10 ESSENTIAL HYPERTENSION: ICD-10-CM

## 2021-05-26 DIAGNOSIS — E11.9 TYPE 2 DIABETES MELLITUS WITHOUT COMPLICATION, WITHOUT LONG-TERM CURRENT USE OF INSULIN (HCC): ICD-10-CM

## 2021-05-26 DIAGNOSIS — R53.83 FATIGUE, UNSPECIFIED TYPE: ICD-10-CM

## 2021-05-26 DIAGNOSIS — E78.2 MIXED HYPERLIPIDEMIA: ICD-10-CM

## 2021-05-26 DIAGNOSIS — Z00.00 ROUTINE GENERAL MEDICAL EXAMINATION AT HEALTH CARE FACILITY: Primary | ICD-10-CM

## 2021-05-26 NOTE — TELEPHONE ENCOUNTER
This can wait until you are back. ... Spoke with patient she just said you wanted her to have labs before her physical with you on 6/15. Call back if additional labs are needed.

## 2021-06-25 ENCOUNTER — TELEPHONE (OUTPATIENT)
Dept: ADMINISTRATIVE | Age: 51
End: 2021-06-25

## 2021-06-28 DIAGNOSIS — E11.9 TYPE 2 DIABETES MELLITUS WITHOUT COMPLICATION, WITHOUT LONG-TERM CURRENT USE OF INSULIN (HCC): Primary | ICD-10-CM

## 2021-06-28 RX ORDER — DULAGLUTIDE 1.5 MG/.5ML
1.5 INJECTION, SOLUTION SUBCUTANEOUS WEEKLY
Qty: 4 PEN | Refills: 3 | Status: SHIPPED | OUTPATIENT
Start: 2021-06-28 | End: 2021-10-25 | Stop reason: SDUPTHER

## 2021-06-28 NOTE — TELEPHONE ENCOUNTER
Medication:   Requested Prescriptions     Pending Prescriptions Disp Refills    TRULICITY 1.5 CE/9.0DG SOPN [Pharmacy Med Name: TRULICITY 1.5 YP/3.9 ML PEN] 4 pen 3     Sig: INJECT 1.5 MG INTO THE SKIN ONCE A WEEK         Last appt: 2/24/2021   Next appt: 8/25/2021    Last Labs DM:   Lab Results   Component Value Date    LABA1C 7.5 02/24/2021

## 2021-06-28 NOTE — TELEPHONE ENCOUNTER
Rx for pantoprazole 40 mg qd  will need a prior authorization unless an alternative can be sent to pharmacy for 90 days

## 2021-07-21 DIAGNOSIS — R60.9 EDEMA, UNSPECIFIED TYPE: ICD-10-CM

## 2021-07-21 DIAGNOSIS — I10 ESSENTIAL HYPERTENSION: ICD-10-CM

## 2021-07-22 RX ORDER — HYDROCHLOROTHIAZIDE 25 MG/1
TABLET ORAL
Qty: 90 TABLET | Refills: 1 | Status: SHIPPED | OUTPATIENT
Start: 2021-07-22 | End: 2021-11-05

## 2021-07-22 NOTE — TELEPHONE ENCOUNTER
Refill request for hctz  medication.      Name of Pharmacy- [facility]       Last visit - 8-     Pending visit - 2-    Last refill -2-2-2021      Medication Contract signed -   Liborio contreras-         Additional Comments

## 2021-08-08 DIAGNOSIS — F41.1 GAD (GENERALIZED ANXIETY DISORDER): ICD-10-CM

## 2021-08-09 RX ORDER — ALPRAZOLAM 1 MG/1
1 TABLET ORAL NIGHTLY PRN
Qty: 30 TABLET | Refills: 2 | Status: SHIPPED | OUTPATIENT
Start: 2021-08-09 | End: 2021-11-05

## 2021-08-09 RX ORDER — CYANOCOBALAMIN 1000 UG/ML
INJECTION INTRAMUSCULAR; SUBCUTANEOUS
Qty: 1 ML | Refills: 11 | Status: SHIPPED | OUTPATIENT
Start: 2021-08-09 | End: 2021-09-08

## 2021-08-09 NOTE — TELEPHONE ENCOUNTER
Rx's sent. Controlled Substance Monitoring:    Acute and Chronic Pain Monitoring:   RX Monitoring 8/9/2021   Attestation -   Periodic Controlled Substance Monitoring No signs of potential drug abuse or diversion identified.

## 2021-08-09 NOTE — TELEPHONE ENCOUNTER
Refill request for alprazolam / b12  medication.      [facility]       Last visit - 2-     Pending visit - 8-    Last refill -6-/3-      Medication Contract signed -PDMP Monitoring:    Last PDMP Cecille Melgar as Reviewed [facility]):  Review User Review Instant Review Result   KRISTOFER Begum 6/8/2021 12:51 PM Reviewed PDMP [1]     [unfilled]  Urine Drug Screenings (1 yr)     Drug screen multi urine  Collected: 11/30/2014  6:15 PM (Final result)    Complete Results              Medication Contract and Consent for Opioid Use Documents Filed     Patient Documents       Type of Document Status Date Received Received By Description     Medication Contract [Status Missing]  ASHUTOSH KAUR Medication Agreement 6/18/15                 Last Oarrs ran-         Additional Comments

## 2021-08-11 ENCOUNTER — HOSPITAL ENCOUNTER (OUTPATIENT)
Dept: WOMENS IMAGING | Age: 51
Discharge: HOME | End: 2021-08-11
Payer: COMMERCIAL

## 2021-08-11 DIAGNOSIS — Z12.31 ENCOUNTER FOR SCREENING MAMMOGRAM FOR MALIGNANT NEOPLASM OF BREAST: ICD-10-CM

## 2021-08-11 PROCEDURE — 77067 SCR MAMMO BI INCL CAD: CPT

## 2021-08-24 ENCOUNTER — OFFICE VISIT (OUTPATIENT)
Dept: INTERNAL MEDICINE CLINIC | Age: 51
End: 2021-08-24
Payer: COMMERCIAL

## 2021-08-24 ENCOUNTER — HOSPITAL ENCOUNTER (OUTPATIENT)
Age: 51
Discharge: HOME | End: 2021-08-24
Payer: COMMERCIAL

## 2021-08-24 VITALS
HEART RATE: 86 BPM | HEIGHT: 64 IN | OXYGEN SATURATION: 98 % | BODY MASS INDEX: 35 KG/M2 | WEIGHT: 205 LBS | DIASTOLIC BLOOD PRESSURE: 78 MMHG | SYSTOLIC BLOOD PRESSURE: 124 MMHG

## 2021-08-24 DIAGNOSIS — E78.2 MIXED HYPERLIPIDEMIA: ICD-10-CM

## 2021-08-24 DIAGNOSIS — E11.9 TYPE 2 DIABETES MELLITUS WITHOUT COMPLICATION, WITHOUT LONG-TERM CURRENT USE OF INSULIN (HCC): ICD-10-CM

## 2021-08-24 DIAGNOSIS — E06.3 HASHIMOTO'S THYROIDITIS: ICD-10-CM

## 2021-08-24 DIAGNOSIS — E55.9 VITAMIN D DEFICIENCY: ICD-10-CM

## 2021-08-24 DIAGNOSIS — Z00.00 ROUTINE GENERAL MEDICAL EXAMINATION AT HEALTH CARE FACILITY: ICD-10-CM

## 2021-08-24 DIAGNOSIS — I10 ESSENTIAL HYPERTENSION: ICD-10-CM

## 2021-08-24 DIAGNOSIS — Z00.00 ROUTINE GENERAL MEDICAL EXAMINATION AT A HEALTH CARE FACILITY: Primary | ICD-10-CM

## 2021-08-24 DIAGNOSIS — R53.83 FATIGUE, UNSPECIFIED TYPE: ICD-10-CM

## 2021-08-24 DIAGNOSIS — Z12.11 COLON CANCER SCREENING: ICD-10-CM

## 2021-08-24 LAB
A/G RATIO: 1.6 (ref 1.1–2.2)
ALBUMIN SERPL-MCNC: 4.5 G/DL (ref 3.4–5)
ALP BLD-CCNC: 106 U/L (ref 40–129)
ALT SERPL-CCNC: 11 U/L (ref 10–40)
ANION GAP SERPL CALCULATED.3IONS-SCNC: 13 MMOL/L (ref 3–16)
AST SERPL-CCNC: 14 U/L (ref 15–37)
BASOPHILS ABSOLUTE: 0.1 K/UL (ref 0–0.2)
BASOPHILS RELATIVE PERCENT: 0.7 %
BILIRUB SERPL-MCNC: 0.3 MG/DL (ref 0–1)
BUN BLDV-MCNC: 17 MG/DL (ref 7–20)
CALCIUM SERPL-MCNC: 9.8 MG/DL (ref 8.3–10.6)
CHLORIDE BLD-SCNC: 98 MMOL/L (ref 99–110)
CHOLESTEROL, TOTAL: 279 MG/DL (ref 0–199)
CO2: 28 MMOL/L (ref 21–32)
CREAT SERPL-MCNC: 0.6 MG/DL (ref 0.6–1.1)
EOSINOPHILS ABSOLUTE: 0.1 K/UL (ref 0–0.6)
EOSINOPHILS RELATIVE PERCENT: 1.2 %
GFR AFRICAN AMERICAN: >60
GFR NON-AFRICAN AMERICAN: >60
GLOBULIN: 2.8 G/DL
GLUCOSE BLD-MCNC: 157 MG/DL (ref 70–99)
HCT VFR BLD CALC: 41.5 % (ref 36–48)
HDLC SERPL-MCNC: 72 MG/DL (ref 40–60)
HEMOGLOBIN: 14.4 G/DL (ref 12–16)
LDL CHOLESTEROL CALCULATED: 172 MG/DL
LYMPHOCYTES ABSOLUTE: 2.3 K/UL (ref 1–5.1)
LYMPHOCYTES RELATIVE PERCENT: 31.1 %
MCH RBC QN AUTO: 30.7 PG (ref 26–34)
MCHC RBC AUTO-ENTMCNC: 34.7 G/DL (ref 31–36)
MCV RBC AUTO: 88.3 FL (ref 80–100)
MONOCYTES ABSOLUTE: 0.7 K/UL (ref 0–1.3)
MONOCYTES RELATIVE PERCENT: 8.7 %
NEUTROPHILS ABSOLUTE: 4.4 K/UL (ref 1.7–7.7)
NEUTROPHILS RELATIVE PERCENT: 58.3 %
PDW BLD-RTO: 13.1 % (ref 12.4–15.4)
PLATELET # BLD: 303 K/UL (ref 135–450)
PMV BLD AUTO: 8 FL (ref 5–10.5)
POTASSIUM SERPL-SCNC: 4.2 MMOL/L (ref 3.5–5.1)
RBC # BLD: 4.7 M/UL (ref 4–5.2)
SODIUM BLD-SCNC: 139 MMOL/L (ref 136–145)
TOTAL PROTEIN: 7.3 G/DL (ref 6.4–8.2)
TRIGL SERPL-MCNC: 176 MG/DL (ref 0–150)
TSH REFLEX: 3.09 UIU/ML (ref 0.27–4.2)
VITAMIN D 25-HYDROXY: 26.5 NG/ML
VLDLC SERPL CALC-MCNC: 35 MG/DL
WBC # BLD: 7.5 K/UL (ref 4–11)

## 2021-08-24 PROCEDURE — 85025 COMPLETE CBC W/AUTO DIFF WBC: CPT

## 2021-08-24 PROCEDURE — 84443 ASSAY THYROID STIM HORMONE: CPT

## 2021-08-24 PROCEDURE — 83036 HEMOGLOBIN GLYCOSYLATED A1C: CPT

## 2021-08-24 PROCEDURE — 99396 PREV VISIT EST AGE 40-64: CPT | Performed by: FAMILY MEDICINE

## 2021-08-24 PROCEDURE — 80053 COMPREHEN METABOLIC PANEL: CPT

## 2021-08-24 PROCEDURE — 80061 LIPID PANEL: CPT

## 2021-08-24 PROCEDURE — 82306 VITAMIN D 25 HYDROXY: CPT

## 2021-08-24 PROCEDURE — 36415 COLL VENOUS BLD VENIPUNCTURE: CPT

## 2021-08-24 SDOH — ECONOMIC STABILITY: FOOD INSECURITY: WITHIN THE PAST 12 MONTHS, THE FOOD YOU BOUGHT JUST DIDN'T LAST AND YOU DIDN'T HAVE MONEY TO GET MORE.: NEVER TRUE

## 2021-08-24 SDOH — ECONOMIC STABILITY: FOOD INSECURITY: WITHIN THE PAST 12 MONTHS, YOU WORRIED THAT YOUR FOOD WOULD RUN OUT BEFORE YOU GOT MONEY TO BUY MORE.: NEVER TRUE

## 2021-08-24 ASSESSMENT — SOCIAL DETERMINANTS OF HEALTH (SDOH): HOW HARD IS IT FOR YOU TO PAY FOR THE VERY BASICS LIKE FOOD, HOUSING, MEDICAL CARE, AND HEATING?: NOT HARD AT ALL

## 2021-08-24 NOTE — PROGRESS NOTES
History and Physical      Erika Dennis  YOB: 1970    Date of Service:  8/24/2021    Chief Complaint:   Erika Dennis is a 44 y.o. female who presents for complete physical examination. HPI: Pt doing well. All chronic conditions stable and tolerating meds well. Mother recently diagnosed definitively with MS. Also has very severe diabetic peripheral neuropathy. Frequent falls. Pt's father has early dementia. Still living in [place] so that is stressful. Has never had colonoscopy. Got Cologuard kit a few years ago but never did it. Sees Dr. Yvonne Bowman for gyn care. Wt Readings from Last 3 Encounters:   08/24/21 205 lb (93 kg)   02/24/21 210 lb (95.3 kg)   02/23/21 210 lb (95.3 kg)     BP Readings from Last 3 Encounters:   08/24/21 124/78   02/24/21 126/87   02/23/21 128/82       Patient Active Problem List   Diagnosis    AMANDA (generalized anxiety disorder)    Insomnia    Hypertension, Essential    Edema    PUD (peptic ulcer disease)    Obesity    Vertigo    Hyperlipidemia, Mixed    Type 2 diabetes mellitus, without long-term current use of insulin (HCC)    B12 deficiency    Depression    OCD (obsessive compulsive disorder)    Fatigue    Cholelithiasis    Hepatitis    Autoimmune hepatitis ([facility] [address]    Leg edema    Vitamin D deficiency    Hashimoto's thyroiditis    Goiter    H/O total hysterectomy       Allergies   Allergen Reactions    Codeine Hives and Nausea And Vomiting    Vicodin [Hydrocodone-Acetaminophen] Nausea And Vomiting     Outpatient Medications Marked as Taking for the 8/24/21 encounter (Office Visit) with Tosin Yan MD   Medication Sig Dispense Refill    cyanocobalamin 1000 MCG/ML injection INJECT 1 ML INTO THE MUSCLE FOR 1 DOSE MONTHLY. 1 mL 11    ALPRAZolam (XANAX) 1 MG tablet Take 1 tablet by mouth nightly as needed for Sleep for up to 90 days.  30 tablet 2    hydroCHLOROthiazide (HYDRODIURIL) 25 MG tablet TAKE 1 TABLET BY MOUTH EVERY DAY 90 tablet 1    TRULICITY 1.5 EF/2.0WX SOPN INJECT 1.5 MG INTO THE SKIN ONCE A WEEK 4 pen 3    metFORMIN (GLUCOPHAGE-XR) 500 MG extended release tablet Take 4 tablets by mouth daily (with breakfast) 360 tablet 1    pantoprazole (PROTONIX) 40 MG tablet Take 1 tablet by mouth every morning (before breakfast) 90 tablet 1    Vitamins/Minerals TABS Take 1 tablet by mouth daily      sertraline (ZOLOFT) 50 MG tablet TAKE 1 TABLET BY MOUTH EVERY DAY 90 tablet 3    blood glucose test strips (ASCENSIA AUTODISC VI;ONE TOUCH ULTRA TEST VI) strip Inject 1 each into the skin 4 times daily As needed. One Touch Ultra test strips. 150 each 11    Insulin Pen Needle (ULTICARE MINI PEN NEEDLES) 31G X 6 MM MISC USE 1 DAILY AS DIRECTED 100 each 5    aspirin 81 MG tablet Take 81 mg by mouth daily      Cholecalciferol (VITAMIN D3) 5000 UNITS CAPS Take 5,000 Int'l Units by mouth daily.            Past Medical History:   Diagnosis Date    Anxiety     B12 deficiency     Depression 9/12/2013    Diabetes mellitus type 2, uncontrolled ([address] 5/9/2012    Edema of hand     Edema of lower extremity     Hepatitis     Hyperglycemia     Hyperlipidemia     Hypertension     Insomnia     Obesity     PUD (peptic ulcer disease)     Vertigo     Vitamin D insufficiency      Past Surgical History:   Procedure Laterality Date    BLADDER SURGERY  2000    bladder suspension    BREAST ENHANCEMENT SURGERY Bilateral 03/04/2021    Dr. Fabian Slaughter, TOTAL ABDOMINAL  12/2004    cervix absent, ovaries intact    VENTRAL HERNIA REPAIR  10/2007     Family History   Problem Relation Age of Onset    High Cholesterol Mother     Heart Disease Father     High Blood Pressure Father     Diabetes Father     High Cholesterol Father     Thyroid Disease Sister     Cancer Maternal Aunt         breast    Breast Cancer Maternal Aunt 61    Heart Disease Paternal Uncle     Cancer Maternal Grandmother breast    Breast Cancer Maternal Grandmother 72     Social History     Socioeconomic History    Marital status:      Spouse name: Thiago Fitzgerald    Number of children: 2    Years of education: college    Highest education level: Not on file   Occupational History    Occupation: Homemaker   Tobacco Use    Smoking status: Never Smoker    Smokeless tobacco: Never Used   Substance and Sexual Activity    Alcohol use: Yes     Alcohol/week: 0.0 standard drinks     Comment: SOCIALLY    Drug use: No    Sexual activity: Yes     Partners: Male   Other Topics Concern    Not on file   Social History Narrative    Not on file     Social Determinants of Health     Financial Resource Strain: Low Risk     Difficulty of Paying Living Expenses: Not hard at all   Food Insecurity: No Food Insecurity    Worried About Running Out of Food in the Last Year: Never true    [address] in the Last Year: Never true   Transportation Needs:     Lack of Transportation (Medical):  Lack of Transportation (Non-Medical):    Physical Activity:     Days of Exercise per Week:     Minutes of Exercise per Session:    Stress:     Feeling of Stress :    Social Connections:     Frequency of Communication with Friends and Family:     Frequency of Social Gatherings with Friends and Family:     Attends [employer] Services:     Active Member of Clubs or Organizations:     Attends Club or Organization Meetings:     Marital Status:    Intimate Partner Violence:     Fear of Current or Ex-Partner:     Emotionally Abused:     Physically Abused:     Sexually Abused:        Review of Systems:  A comprehensive review of systems was negative except for what was noted in the HPI. Physical Exam:   Vitals:    08/24/21 0854   BP: 124/78   Site: Right Upper Arm   Position: Sitting   Cuff Size: Large Adult   Pulse: 86   SpO2: 98%   Weight: 205 lb (93 kg)   Height: 5' 3.5\" (1.613 m)     Body mass index is 35.74 kg/m². Constitutional: She is oriented to person, place, and time. She appears well-developed and well-nourished. No distress. HEENT:   Head: Normocephalic and atraumatic. Right Ear: Tympanic membrane, external ear and ear canal normal.   Left Ear: Tympanic membrane, external ear and ear canal normal.   Mouth/Throat: Oropharynx is clear and moist, and mucous membranes are normal.  There is no cervical adenopathy. Eyes: Conjunctivae and extraocular motions are normal. Pupils are equal, round, and reactive to light. Neck: Supple. No JVD present. Carotid bruit is not present. No mass and no thyromegaly present. Cardiovascular: Normal rate, regular rhythm, normal heart sounds and intact distal pulses. Exam reveals no gallop and no friction rub. No murmur heard. Pulmonary/Chest: Effort normal and breath sounds normal. No respiratory distress. She has no wheezes, rhonchi or rales. Abdominal: Soft, non-tender. Bowel sounds and aorta are normal. She exhibits no organomegaly, mass or bruit. Genitourinary: performed by gynecologist.  Breast exam:  performed by specialist.  Musculoskeletal: Normal range of motion, no synovitis. She exhibits no edema. Neurological: She is alert and oriented to person, place, and time. She has normal reflexes. No cranial nerve deficit. Coordination normal.   Skin: Skin is warm and dry. There is no rash or erythema. No suspicious lesions noted. Psychiatric: She has a normal mood and affect. Her speech is normal and behavior is normal. Judgment, cognition and memory are normal.   Feet: Sensory exam of the foot is normal, tested with the monofilament. Good pulses, no lesions or ulcers.              Preventive Care:  Health Maintenance   Topic Date Due    Diabetic retinal exam  Never done    COVID-19 Vaccine (1) Never done    Hepatitis B vaccine (1 of 3 - Risk 3-dose series) Never done    Colon cancer screen colonoscopy  Never done    Shingles Vaccine (1 of 2) Never done    Flu vaccine (1) 09/01/2021    Diabetic microalbuminuria test  10/21/2021    Lipid screen  10/21/2021    A1C test (Diabetic or Prediabetic)  02/24/2022    Potassium monitoring  02/25/2022    Creatinine monitoring  02/25/2022    Diabetic foot exam  08/24/2022    Breast cancer screen  08/11/2023    DTaP/Tdap/Td vaccine (3 - Td or Tdap) 08/15/2027    Pneumococcal 0-64 years Vaccine (2 of 2 - PPSV23) 09/30/2033    Hepatitis C screen  Completed    HIV screen  Addressed    Hepatitis A vaccine  Aged Out    Hib vaccine  Aged Out    Meningococcal (ACWY) vaccine  Aged Out      Hx abnormal PAP: no - sees Dr. Truman Mccoy- last saw him about 3-4 months ago (end of May)- S/P total hysterectomy for birth trauma. Ovaries intact. Sexual activity: single partner, contraception - status post hysterectomy   Self-breast exams: yes  Last eye exam: within the last year,normal - [facility]  Exercise: doing yarwork and swims laps in the pool qpm for about 30 minutes  Seatbelt use: yes       Preventive plan of care for Kevin Flank        8/24/2021           Preventive Measures Status       Recommendations for screening   Colon Cancer Screen   Last colonoscopy: none Cologuard ordered   Breast Cancer Screen  Last mammogram: 8/11/21- normal  Repeat yearly   Cervical Cancer Screen   Last PAP smear: unsure- s/p NATI- UTD with gyn exams Repeat exams per gyn MD recs   Osteoporosis Screen   Last DXA scan: none Consider baseline screening in the next several years   Diabetes Screen  Glucose (mg/dL)   Date Value   02/25/2021 156 (H)    Screening not needed due to existing diagnosis   Cholesterol Screen  Lab Results   Component Value Date    CHOL 239 (H) 10/21/2020    TRIG 124 10/21/2020    HDL 64 (H) 10/21/2020    LDLCALC 150 (H) 10/21/2020    Screening not needed due to existing diagnosis   Aspirin for Cardiovascular Prevention   Yes Continue daily aspirin   Weight: Body mass index is 35.74 kg/m².   5' 3.5\" (1.488 m)205 lb (93 kg)    Your BMI is 25 or greater, which indicates that you are overweight  Continue healthy lifestyle changes (diet/exercise/attempt weight loss).     Living Will: No   Consider drafting one    Recommended Immunizations    Immunization History   Administered Date(s) Administered    Influenza Nasal 09/06/2011    Influenza Vaccine, unspecified formulation 11/01/2017    Influenza Virus Vaccine 03/09/2009, 12/14/2009, 11/24/2014, 12/16/2015, 10/01/2018, 10/20/2020    Influenza, Intradermal, Preservative free 10/18/2012, 09/12/2013    Influenza, MDCK Quadv, IM, PF (Flucelvax 4 yrs and older) 10/03/2019    Influenza, Quadv, IM, PF (6 mo and older Fluzone, Flulaval, Fluarix, and 3 yrs and older Afluria) 10/04/2018, 10/20/2020    Pneumococcal Polysaccharide ([id number]) 12/16/2015    Tdap (Boostrix, Adacel) 02/13/2006, 08/15/2017        Influenza vaccine:  recommended every fall  Pneumonia vaccine: due at age 72  Tetanus vaccine:  tetanus, diptheria and pertussis vaccine (Tdap) previously administered after age 23- no need to repeat, tetanus and diptheria vaccine (Td) recommended every 10 years- due 8/2027   Shingles vaccine:  recommended as a one time dose after the age of 48, pt will get at the pharmacy  COVID vaccine: discussed risks/benefits- pt will consider getting         Other Recommendations ·   Follow up in this office every 6 months for re-evaluation of your chronic medical issues  · See an eye specialist every 1 years  · See a dentist every 6 months  · Try to get at least 30 minutes of exercise 3-4 days per week  · Always wear a seat belt when traveling in a car  · When exposed to the sun, use a sunscreen that protects against both UVA and UVB radiation with an SPF of 30 or greater- reapply every 2 to 3 hours or after sweating, drying off with a towel, or swimming  · You need 5410-9495 mg of calcium and 3555-5998 IU of vitamin D per day- it is possible to meet your calcium requirement with diet alone, but a vitamin D supplement is usually necessary  · Have your blood pressure checked at least once every year                 Assessment/Plan:    Maureen Cage was seen today for annual exam and other. Diagnoses and all orders for this visit:    Routine general medical examination at a health care facility  -      DIABETES FOOT EXAM  -     Cologuard (For External Results Only); Future    Colon cancer screening  -     Cologuard (For External Results Only); Future    Type 2 diabetes mellitus without complication, without long-term current use of insulin (HCC)  -      DIABETES FOOT EXAM    Hypertension, Essential    Hyperlipidemia, Mixed    Hashimoto's thyroiditis    Vitamin D deficiency      Pt had fasting labs drawn this am.    Patient Instructions   Do the Cologuard test when it comes in the mail. Get the shingles vaccine at the pharmacy. Consider getting the COVID vaccine- this cannot be given within 2 weeks of any other vaccines. Continue current medicines. Continue healthy lifestyle changes (diet/exercise/attempt weight loss). Patient education materials given in AVS re: age appropriate well care. Return in about 6 months (around 2/24/2022) for Fasting recheck DM, lipids, BP, anxiety.

## 2021-08-24 NOTE — PATIENT INSTRUCTIONS
Do the Cologuard test when it comes in the mail. Get the shingles vaccine at the pharmacy. Consider getting the COVID vaccine- this cannot be given within 2 weeks of any other vaccines. Continue current medicines. Continue healthy lifestyle changes (diet/exercise/attempt weight loss). Return in about 6 months (around 2/24/2022) for Fasting recheck DM, lipids, BP, anxiety. Patient Education        Well Visit, Women 48 to 72: Care Instructions  Overview     Well visits can help you stay healthy. Your doctor has checked your overall health and may have suggested ways to take good care of yourself. Your doctor also may have recommended tests. At home, you can help prevent illness with healthy eating, regular exercise, and other steps. Follow-up care is a key part of your treatment and safety. Be sure to make and go to all appointments, and call your doctor if you are having problems. It's also a good idea to know your test results and keep a list of the medicines you take. How can you care for yourself at home? · Get screening tests that you and your doctor decide on. Screening helps find diseases before any symptoms appear. · Eat healthy foods. Choose fruits, vegetables, whole grains, protein, and low-fat dairy foods. Limit fat, especially saturated fat. Reduce salt in your diet. · Limit alcohol. Have no more than 1 drink a day or 7 drinks a week. · Get at least 30 minutes of exercise on most days of the week. Walking is a good choice. You also may want to do other activities, such as running, swimming, cycling, or playing tennis or team sports. · Reach and stay at a healthy weight. This will lower your risk for many problems, such as obesity, diabetes, heart disease, and high blood pressure. · Do not smoke. Smoking can make health problems worse. If you need help quitting, talk to your doctor about stop-smoking programs and medicines. These can increase your chances of quitting for good.   · Care for your mental health. It is easy to get weighed down by worry and stress. Learn strategies to manage stress, like deep breathing and mindfulness, and stay connected with your family and community. If you find you often feel sad or hopeless, talk with your doctor. Treatment can help. · Talk to your doctor about whether you have any risk factors for sexually transmitted infections (STIs). You can help prevent STIs if you wait to have sex with a new partner (or partners) until you've each been tested for STIs. It also helps if you use condoms (male or female condoms) and if you limit your sex partners to one person who only has sex with you. Vaccines are available for some STIs. · If you think you may have a problem with alcohol or drug use, talk to your doctor. This includes prescription medicines (such as amphetamines and opioids) and illegal drugs (such as cocaine and methamphetamine). Your doctor can help you figure out what type of treatment is best for you. · Protect your skin from too much sun. When you're outdoors from 10 a.m. to 4 p.m., stay in the shade or cover up with clothing and a hat with a wide brim. Wear sunglasses that block UV rays. Even when it's cloudy, put broad-spectrum sunscreen (SPF 30 or higher) on any exposed skin. · See a dentist one or two times a year for checkups and to have your teeth cleaned. · Wear a seat belt in the car. When should you call for help? Watch closely for changes in your health, and be sure to contact your doctor if you have any problems or symptoms that concern you. Where can you learn more? Go to https://carlos.healthWhistle. org and sign in to your [employer] account. Enter N673 in the [employer] box to learn more about \"Well Visit, Women 50 to 72: Care Instructions. \"     If you do not have an account, please click on the \"Sign Up Now\" link. Current as of: May 27, 2020               Content Version: 12.9  © 1456-9164 Healthwise, Incorporated. Care instructions adapted under license by [facility] ([facility]). If you have questions about a medical condition or this instruction, always ask your healthcare professional. [address] any warranty or liability for your use of this information.

## 2021-08-25 LAB
ESTIMATED AVERAGE GLUCOSE: 159.9 MG/DL
HBA1C MFR BLD: 7.2 %

## 2021-08-26 ENCOUNTER — PATIENT MESSAGE (OUTPATIENT)
Dept: INTERNAL MEDICINE CLINIC | Age: 51
End: 2021-08-26

## 2021-08-26 DIAGNOSIS — E78.2 MIXED HYPERLIPIDEMIA: Primary | ICD-10-CM

## 2021-08-26 RX ORDER — ROSUVASTATIN CALCIUM 5 MG/1
5 TABLET, COATED ORAL DAILY
Qty: 30 TABLET | Refills: 5 | Status: SHIPPED | OUTPATIENT
Start: 2021-08-26 | End: 2021-09-21

## 2021-08-26 NOTE — TELEPHONE ENCOUNTER
From: Alpesh Flower  To: Talia Flores MD  Sent: 8/26/2021 11:20 AM EDT  Subject: Prescription Question    Dr. Burke Mo, yes I would be willing to try the crestor for cholesterol. I was surprised to see that it was back up again. Can we try the lowest dose?  Thanks, Randy Thomason

## 2021-09-08 RX ORDER — CYANOCOBALAMIN 1000 UG/ML
INJECTION INTRAMUSCULAR; SUBCUTANEOUS
Qty: 1 ML | Refills: 11 | Status: SHIPPED | OUTPATIENT
Start: 2021-09-08 | End: 2021-11-05 | Stop reason: SDUPTHER

## 2021-09-08 NOTE — TELEPHONE ENCOUNTER
Refill request for Cyanocobalamin medication.      Name of Pharmacy- [facility]      Last visit - 8/24/21     Pending visit - 3/1/22    Last refill -8/9/21      Medication Contract signed -   Last Oarrs ran-         Additional Comments

## 2021-09-21 DIAGNOSIS — E78.2 MIXED HYPERLIPIDEMIA: ICD-10-CM

## 2021-09-21 RX ORDER — ROSUVASTATIN CALCIUM 5 MG/1
TABLET, COATED ORAL
Qty: 30 TABLET | Refills: 5 | Status: SHIPPED | OUTPATIENT
Start: 2021-09-21 | End: 2022-09-27 | Stop reason: SDUPTHER

## 2021-09-21 NOTE — TELEPHONE ENCOUNTER
Refill request for Crestor medication.      Name of Pharmacy- [facility]      Last visit - 8/24/21     Pending visit - 3/1/22    Last refill -8/26/21      Medication Contract signed -   Last Oarrs ran-         Additional Comments

## 2021-10-11 ENCOUNTER — PATIENT MESSAGE (OUTPATIENT)
Dept: INTERNAL MEDICINE CLINIC | Age: 51
End: 2021-10-11

## 2021-10-12 RX ORDER — CIPROFLOXACIN 500 MG/1
500 TABLET, FILM COATED ORAL 2 TIMES DAILY
Qty: 6 TABLET | Refills: 0 | Status: SHIPPED | OUTPATIENT
Start: 2021-10-12 | End: 2021-10-15

## 2021-10-12 NOTE — TELEPHONE ENCOUNTER
From: Leatha Courser  To: Dilan Savage MD  Sent: 10/11/2021 8:20 PM EDT  Subject: Prescription Question    Dr Rickey Gómez, I have an Uti, started over the weekend, did an at home test, shows positive, common with my sugar running high. Can you call me in an antibiotic? Thank you, please let me know.

## 2021-10-23 DIAGNOSIS — E11.9 TYPE 2 DIABETES MELLITUS WITHOUT COMPLICATION, WITHOUT LONG-TERM CURRENT USE OF INSULIN (HCC): ICD-10-CM

## 2021-10-25 ENCOUNTER — TELEPHONE (OUTPATIENT)
Dept: INTERNAL MEDICINE CLINIC | Age: 51
End: 2021-10-25

## 2021-10-25 DIAGNOSIS — E11.9 TYPE 2 DIABETES MELLITUS WITHOUT COMPLICATION, WITHOUT LONG-TERM CURRENT USE OF INSULIN (HCC): ICD-10-CM

## 2021-10-25 RX ORDER — DULAGLUTIDE 1.5 MG/.5ML
1.5 INJECTION, SOLUTION SUBCUTANEOUS WEEKLY
Refills: 3 | OUTPATIENT
Start: 2021-10-25

## 2021-10-25 NOTE — TELEPHONE ENCOUNTER
Medication:   Requested Prescriptions     Pending Prescriptions Disp Refills    TRULICITY 1.5 LK/3.2QP SOPN [Pharmacy Med Name: Leighann Vaughan 1.5 XN/9.0 ML PEN]  3     Sig: INJECT 1.5 MG INTO THE SKIN ONCE A WEEK       Last Filled:      Patient Phone Number: [phone] (home) [phone] (work)    Last appt: 2/24/2021   Next appt: Visit date not found    Last Labs DM:   Lab Results   Component Value Date    LABA1C 7.2 08/24/2021

## 2021-10-25 NOTE — TELEPHONE ENCOUNTER
[employer] sent over a paper stating that the patient has not done the Cologuard test. They have made several attempts and patient still hasn't done it.

## 2021-10-26 RX ORDER — DULAGLUTIDE 1.5 MG/.5ML
1.5 INJECTION, SOLUTION SUBCUTANEOUS WEEKLY
Qty: 4 PEN | Refills: 3 | Status: SHIPPED | OUTPATIENT
Start: 2021-10-26 | End: 2021-11-10 | Stop reason: DRUGHIGH

## 2021-10-26 RX ORDER — DULAGLUTIDE 1.5 MG/.5ML
1.5 INJECTION, SOLUTION SUBCUTANEOUS WEEKLY
Qty: 4 PEN | Refills: 3 | Status: SHIPPED | OUTPATIENT
Start: 2021-10-26 | End: 2021-10-28 | Stop reason: SDUPTHER

## 2021-10-27 ENCOUNTER — TELEPHONE (OUTPATIENT)
Dept: ENDOCRINOLOGY | Age: 51
End: 2021-10-27

## 2021-10-27 DIAGNOSIS — E11.9 TYPE 2 DIABETES MELLITUS WITHOUT COMPLICATION, WITHOUT LONG-TERM CURRENT USE OF INSULIN (HCC): Primary | ICD-10-CM

## 2021-10-27 NOTE — TELEPHONE ENCOUNTER
Patient needs a refill of her Dulaglutide (TRULICITY) 1.5 KQ/3.0RG SOPN she also would like an order for her A1C to be placed so she can get that done prior to her next appt.              [facility] [place] [place] - [address]   Phone:  [phone]  Fax:  [phone]

## 2021-10-28 RX ORDER — DULAGLUTIDE 1.5 MG/.5ML
1.5 INJECTION, SOLUTION SUBCUTANEOUS WEEKLY
Qty: 4 PEN | Refills: 3 | Status: SHIPPED | OUTPATIENT
Start: 2021-10-28 | End: 2021-11-10 | Stop reason: DRUGHIGH

## 2021-11-05 DIAGNOSIS — I10 ESSENTIAL HYPERTENSION: ICD-10-CM

## 2021-11-05 DIAGNOSIS — R60.9 EDEMA, UNSPECIFIED TYPE: ICD-10-CM

## 2021-11-05 DIAGNOSIS — F41.1 GAD (GENERALIZED ANXIETY DISORDER): ICD-10-CM

## 2021-11-05 RX ORDER — METFORMIN HYDROCHLORIDE 500 MG/1
2000 TABLET, EXTENDED RELEASE ORAL
Qty: 360 TABLET | Refills: 1 | Status: SHIPPED | OUTPATIENT
Start: 2021-11-05 | End: 2021-11-10 | Stop reason: SDUPTHER

## 2021-11-05 RX ORDER — ALPRAZOLAM 1 MG/1
1 TABLET ORAL NIGHTLY PRN
Qty: 30 TABLET | Refills: 2 | Status: SHIPPED | OUTPATIENT
Start: 2021-11-05 | End: 2022-02-03 | Stop reason: SDUPTHER

## 2021-11-05 RX ORDER — HYDROCHLOROTHIAZIDE 25 MG/1
TABLET ORAL
Qty: 90 TABLET | Refills: 1 | Status: SHIPPED | OUTPATIENT
Start: 2021-11-05 | End: 2022-06-09 | Stop reason: SDUPTHER

## 2021-11-05 NOTE — TELEPHONE ENCOUNTER
Refill request for alprazolam  medication.      Name of Pharmacy- [facility]       Last visit - 8-     Pending visit - 3-1-22    Last refill -8-9-2021      Medication Contract signed -PDMP Monitoring:    Last PDMP Gladys Youngblood as Reviewed [facility]):  Review User Review Instant Review Result   DOROTHEA Chacko 8/9/2021  2:14 PM Reviewed PDMP [1]     [unfilled]  Urine Drug Screenings (1 yr)     Drug screen multi urine  Collected: 11/30/2014  6:15 PM (Final result)    Complete Results              Medication Contract and Consent for Opioid Use Documents Filed     Patient Documents       Type of Document Status Date Received Received By Description     Medication Contract [Status Missing]  ASHUTOSH KAUR Medication Agreement 6/18/15                 Last Oarrs ran-         Additional Comments

## 2021-11-05 NOTE — TELEPHONE ENCOUNTER
Refill request for metformin / hctz  medication.      Name of Pharmacy- [facility]       Last visit - 8-     Pending visit - 3-1-22    Last refill -4-/7-      Medication Contract signed -   Last Joel contreras-         Additional Comments

## 2021-11-10 ENCOUNTER — VIRTUAL VISIT (OUTPATIENT)
Dept: ENDOCRINOLOGY | Age: 51
End: 2021-11-10
Payer: COMMERCIAL

## 2021-11-10 DIAGNOSIS — E11.9 TYPE 2 DIABETES MELLITUS WITHOUT COMPLICATION, WITHOUT LONG-TERM CURRENT USE OF INSULIN (HCC): Primary | ICD-10-CM

## 2021-11-10 DIAGNOSIS — E55.9 VITAMIN D DEFICIENCY: ICD-10-CM

## 2021-11-10 DIAGNOSIS — E78.2 MIXED HYPERLIPIDEMIA: ICD-10-CM

## 2021-11-10 DIAGNOSIS — I10 ESSENTIAL HYPERTENSION: ICD-10-CM

## 2021-11-10 DIAGNOSIS — E06.3 HASHIMOTO'S THYROIDITIS: ICD-10-CM

## 2021-11-10 PROCEDURE — 99213 OFFICE O/P EST LOW 20 MIN: CPT | Performed by: NURSE PRACTITIONER

## 2021-11-10 PROCEDURE — 3051F HG A1C>EQUAL 7.0%<8.0%: CPT | Performed by: NURSE PRACTITIONER

## 2021-11-10 RX ORDER — DULAGLUTIDE 3 MG/.5ML
3 INJECTION, SOLUTION SUBCUTANEOUS WEEKLY
Qty: 4 PEN | Refills: 2 | Status: SHIPPED | OUTPATIENT
Start: 2021-11-10 | End: 2022-01-20 | Stop reason: SDUPTHER

## 2021-11-10 RX ORDER — METFORMIN HYDROCHLORIDE 500 MG/1
2000 TABLET, EXTENDED RELEASE ORAL
Qty: 360 TABLET | Refills: 1 | Status: SHIPPED | OUTPATIENT
Start: 2021-11-10 | End: 2022-10-11 | Stop reason: SDUPTHER

## 2021-11-10 ASSESSMENT — ENCOUNTER SYMPTOMS
DIARRHEA: 0
COLOR CHANGE: 0
SHORTNESS OF BREATH: 0
EYE PAIN: 0
NAUSEA: 0
CONSTIPATION: 0

## 2021-11-10 NOTE — PROGRESS NOTES
Endocrinology  Theresa Osman, CNP, [address]  [place], [address]  Phone [phone]  Fax [phone]    Beatrice Drake is  being evaluated by a Virtual Visit (video visit) encounter to address concerns as mentioned above. Due to this being a TeleHealth encounter (During [facility] public health emergency), evaluation of the following organ systems was limited: Vitals/Constitutional/EENT/Resp/CV/GI//MS/Neuro/Skin/Heme-Lymph-Imm. Pursuant to the emergency declaration under the [facility], [address] [place] [address] authority and the [employer] Act, this Virtual Visit was conducted with patient's (and/or legal guardian's) consent, to reduce the patient's risk of exposure to COVID-19 and provide necessary medical care. The patient (and/or legal guardian) has also been advised to contact this office for worsening conditions or problems, and seek emergency medical treatment and/or call 911 if deemed necessary. Services were provided through a video synchronous discussion virtually to substitute for in-person clinic visit. Patient and provider were located at their individual homes    Patient was identified via name,  on the video visit    Beatrice Drake is a 46 y.o. female who is following up for management of Diabetes Mellitus Type 2. Last A1C:   Lab Results   Component Value Date    LABA1C 7.2 2021    LABA1C 7.5 2021    LABA1C 7.6 10/21/2020     Last BP Readings:  BP Readings from Last 3 Encounters:   21 124/78   21 126/87   21 128/82     Last LDL:   Lab Results   Component Value Date    LDLCALC 172 (H) 2021    LDLCHOLESTEROL 150 (H) 08/15/2017     Aspirin Use: 81 mg     Tobacco/Alcohol History:    Smoking status: Never Smoker    Smokeless tobacco: Never Used    Alcohol use:  Yes     Alcohol/week: 0.0 oz     Comment: SOCIALLY PMH includes  Past Medical History:   Diagnosis Date    Anxiety     B12 deficiency     Depression 9/12/2013    Diabetes mellitus type 2, uncontrolled ([facility] [address] 5/9/2012    Edema of hand     Edema of lower extremity     Hepatitis     Hyperglycemia     Hyperlipidemia     Hypertension     Insomnia     Obesity     PUD (peptic ulcer disease)     Vertigo     Vitamin D insufficiency      [facility] ADA Mendez  has been diabetic for 5  years and on insulin for  5 years. Diagnosed after being on high dose prednisone for auto immune hepatitis, was in ED with DKA and BG > 600  Patient reports that diabetes is generally not well controlled. Disease course has been variable  Current microvascular complications include none  Current Macrovascular complications include no h/o CAD, PVD  Patient reports compliance for about 80% of the time and adheres to medication, but usually not to diet and exercise instructions.    There have been no recent hospital or ED admissions for hypoglycemia, hyperglycemia or DKA  Lost another 10 lbs since clast in office weight in    Blood glucose trends:  Patient brought log glucometer for download: no  Readings per day  <1  per patient report  Average reading 130-230--: 120-150  Fasting 120-140  Prandial: 150  Hypoglycemia awareness and symptoms:  Has not done CGM--> [employer] applied previous visit, patient did not return for download as it fell off x 2    Current Medication regimen:   Trulicity 1.5 mg QW   Metformin XR 2000 mg QD    Other meds tried in past: novolog/humalog/lantus/levemir/toujeo  GFR > 60    Current Dietary regimen:   BF : special K bf sw/sausage/turkey/egg  Lunch : salad and chicken  Dinner: carbs/protein/vegetable  Snacks: none  Beverages: water and diet coke  Average carbs per day: >100 grams per day       Microvascular Complications:  · Neuropathy: denies concerns  · Retinopathy: no concerns with vision, field of vision  · Nephropathy:  Component      Latest Ref Rng & Units 1/29/2019   Microalbumin, Random Urine      <2.0 mg/dL <1.20   Creatinine, Ur      28.0 - 259.0 mg/dL 65.4   Microalbumin Creatinine Ratio      0.0 - 30.0 mg/g see below     Diabetic Health Maintenance   · Last Eye Exam: > 1 year ago  · Last Foot exam: none  · Has patient seen a dietitian? Yes  · Current Exercise: No structured exercise  · On ACEI or ARB: none  · On statin: Crestor 10 mg    Hyperlipidemia: Current complaints include occasional myalgias but otherwise tolerates well. Lab Results   Component Value Date    CHOL 279 08/24/2021    CHOL 239 10/21/2020    CHOL 281 07/31/2019     Lab Results   Component Value Date    TRIG 176 08/24/2021    TRIG 124 10/21/2020    TRIG 207 07/31/2019     Lab Results   Component Value Date    HDL 72 08/24/2021    HDL 64 10/21/2020    HDL 70 07/31/2019    HDL 62 05/10/2012     Lab Results   Component Value Date    LDLCHOLESTEROL 150 08/15/2017    LDLCHOLESTEROL 141 04/06/2017    LDLCHOLESTEROL 140 05/25/2016    LDLCALC 172 08/24/2021    LDLCALC 150 10/21/2020    LDLCALC 170 07/31/2019     No results found for: LDLDIRECT  No results found for: CHOLHDLRATIO    Vitamin D deficiency: Currently is on > 500 IU QD. Current complaints include fatigue on daily basis. Last vitamin Dlevel is:  Lab Results   Component Value Date    VITD25 26.5 08/24/2021    VITD25 29.4 01/29/2019    VITD25 30.0 08/15/2017     Hypertension  Currently on HCTZ 5 mg. Controlled , denies symptoms of dizziness, light headedness. Occasional dependent edema. Tries to follow a salt restricted diet.    Lab Results   Component Value Date     08/24/2021    K 4.2 08/24/2021    CL 98 (L) 08/24/2021    CO2 28 08/24/2021    BUN 17 08/24/2021    CREATININE 0.6 08/24/2021    GLUCOSE 157 (H) 08/24/2021    CALCIUM 9.8 08/24/2021    PROT 7.3 08/24/2021    LABALBU 4.5 08/24/2021    BILITOT 0.3 08/24/2021    ALKPHOS 106 08/24/2021    AST 14 (L) 08/24/2021    ALT 11 08/24/2021    LABGLOM >60 08/24/2021 GFRAA >60 08/24/2021    AGRATIO 1.6 08/24/2021    GLOB 2.8 08/24/2021     The 10-year ASCVD risk score (Awa Guadarrama et al., 2013) is: 4.4%    Values used to calculate the score:      Age: 46 years      Sex: Female      Is Non- : No      Diabetic: Yes      Tobacco smoker: No      Systolic Blood Pressure: 903 mmHg      Is BP treated: Yes      HDL Cholesterol: 72 mg/dL      Total Cholesterol: 279 mg/dL    Family History   Problem Relation Age of Onset    High Cholesterol Mother     Heart Disease Father     High Blood Pressure Father     Diabetes Father     High Cholesterol Father     Thyroid Disease Sister     Cancer Maternal Aunt         breast    Breast Cancer Maternal Aunt 61    Heart Disease Paternal Uncle     Cancer Maternal Grandmother         breast    Breast Cancer Maternal Grandmother 72     Review of Systems   Constitutional: Negative for activity change, appetite change, diaphoresis, fever and unexpected weight change. HENT: Negative for dental problem. Eyes: Negative for pain and visual disturbance. Respiratory: Negative for shortness of breath. Cardiovascular: Negative for chest pain, palpitations and leg swelling. Gastrointestinal: Negative for constipation, diarrhea and nausea. Endocrine: Negative for cold intolerance, heat intolerance, polydipsia, polyphagia and polyuria. Genitourinary: Negative for frequency and urgency. Musculoskeletal: Negative for arthralgias, joint swelling and myalgias. Skin: Negative for color change and pallor. Neurological: Negative for weakness, numbness and headaches. Psychiatric/Behavioral: Negative for dysphoric mood and sleep disturbance. The patient is not nervous/anxious. There were no vitals filed for this visit. televisit    Physical Exam  Vitals reviewed. Constitutional:       Appearance: She is well-developed. Eyes:      Pupils: Pupils are equal, round, and reactive to light.    Neck: Thyroid: No thyromegaly. Cardiovascular:      Rate and Rhythm: Normal rate and regular rhythm. Heart sounds: Normal heart sounds. Pulmonary:      Effort: Pulmonary effort is normal.      Breath sounds: Normal breath sounds. Abdominal:      General: There is no distension. Musculoskeletal:         General: Normal range of motion. Cervical back: Normal range of motion. Skin:     General: Skin is warm and dry. Comments: No skin changes or evidence of trauma. Neurological:      Mental Status: She is alert and oriented to person, place, and time. Sensory: No sensory deficit. Psychiatric:         Behavior: Behavior normal.         Thought Content: Thought content normal.       Skeletal foot exam: deferred.     Assessment  Aramis Morales is a 46 y.o. female with Diabetes Mellitus type 2 associated with HTN, HLD, obesity and elevated TSH levels    Plan  Problem List Items Addressed This Visit     Hashimoto's thyroiditis                Hyperlipidemia, Mixed      LDL goal  < 100; recent at  TG  elevated at  176  Continue current regimen  Managed by PCP         Hypertension, Essential    Type 2 diabetes mellitus, without long-term current use of insulin ([facility] [address] - Primary     Lab Results   Component Value Date    LABA1C 7.2 08/24/2021     Lab Results   Component Value Date    .9 08/24/2021     Goal A1c  < 6.5%  Titrated off of Insulin  Increased dose of Trulicity to 3mg; tolerates well now  Avoid hypoglycemia  Discussed fasting and prandial goal ranges for BG      Diet :   30-40 grams per meal , 3 meals per day, avoid carbs in snack choices  Include moderate proteins and good fats   Ensure adequate hydration and  electrolyte replacement     Exercise :  Recommended exercise is 5-7 days a week for 30-60 mins at least, per day OR a total of 2.5 hours per week , which ever is more feasible.     Diabetic Health Maintenance  Follow up with annual eye exams  Follow up with annual podiatry exams if needed  Reviewed sick day management of BG      Other areas of Diabetic Education reviewed:  · Carbs: good carbs and bad carbs, importance of carb counting, incorporation of protein with each meal to reduce Glycemic index, importance of portions, Carb/insulin ratio  · Fats: Good fats and bad fats, meal planning and supplements. · Discussed how food affects blood sugar readings.   · Different diabetic medications  · Managing high and low sugar readings  · Rotation of sites for subcutaneous medication injection               Relevant Medications    Dulaglutide (TRULICITY) 3 IF/4.8FK SOPN    metFORMIN (GLUCOPHAGE-XR) 500 MG extended release tablet    Other Relevant Orders    Hemoglobin A1C    Lipid Panel    Vitamin D deficiency          Return in about 6 months (around 5/10/2022).

## 2021-11-16 NOTE — ASSESSMENT & PLAN NOTE
Lab Results   Component Value Date    LABA1C 7.2 08/24/2021     Lab Results   Component Value Date    .9 08/24/2021     Goal A1c  < 6.5%  Titrated off of Insulin  Increased dose of Trulicity to 3mg; tolerates well now  Avoid hypoglycemia  Discussed fasting and prandial goal ranges for BG      Diet :   30-40 grams per meal , 3 meals per day, avoid carbs in snack choices  Include moderate proteins and good fats   Ensure adequate hydration and  electrolyte replacement     Exercise :  Recommended exercise is 5-7 days a week for 30-60 mins at least, per day OR a total of 2.5 hours per week , which ever is more feasible.     Diabetic Health Maintenance  Follow up with annual eye exams  Follow up with annual podiatry exams if needed  Reviewed sick day management of BG      Other areas of Diabetic Education reviewed:  · Carbs: good carbs and bad carbs, importance of carb counting, incorporation of protein with each meal to reduce Glycemic index, importance of portions, Carb/insulin ratio  · Fats: Good fats and bad fats, meal planning and supplements.   · Discussed how food affects blood sugar readings.   · Different diabetic medications  · Managing high and low sugar readings  · Rotation of sites for subcutaneous medication injection

## 2021-12-27 DIAGNOSIS — R60.9 EDEMA, UNSPECIFIED TYPE: ICD-10-CM

## 2021-12-27 DIAGNOSIS — I10 ESSENTIAL HYPERTENSION: ICD-10-CM

## 2021-12-28 RX ORDER — HYDROCHLOROTHIAZIDE 25 MG/1
TABLET ORAL
Qty: 90 TABLET | Refills: 1 | OUTPATIENT
Start: 2021-12-28

## 2022-01-12 NOTE — TELEPHONE ENCOUNTER
Refill request for Sertraline/Pantoprazole medication.      Name of Pharmacy- [facility]      Last visit - 8/24/21     Pending visit - 3/1/22    Last refill -2/2/21-3/22/21      Medication Contract signed -   Last Oarrs ran-         Additional Comments

## 2022-01-13 RX ORDER — PANTOPRAZOLE SODIUM 40 MG/1
TABLET, DELAYED RELEASE ORAL
Qty: 90 TABLET | Refills: 1 | Status: SHIPPED | OUTPATIENT
Start: 2022-01-13 | End: 2022-06-23

## 2022-01-14 ENCOUNTER — TELEPHONE (OUTPATIENT)
Dept: INTERNAL MEDICINE CLINIC | Age: 52
End: 2022-01-14

## 2022-01-14 NOTE — TELEPHONE ENCOUNTER
----- Message from Saida Garcia sent at 1/14/2022  7:11 AM EST -----  Subject: Message to Provider    QUESTIONS  Information for Provider? Pt called to inform Dr. Acosta Chopra that she is still   taking the 25 mg in addition to the 50 mg pill for Zoloft.   ---------------------------------------------------------------------------  --------------  CALL BACK INFO  What is the best way for the office to contact you? OK to leave message on   voicemail  Preferred Call Back Phone Number? [phone]  ---------------------------------------------------------------------------  --------------  SCRIPT ANSWERS  Relationship to Patient?  Self

## 2022-01-19 RX ORDER — DULAGLUTIDE 3 MG/.5ML
3 INJECTION, SOLUTION SUBCUTANEOUS WEEKLY
Qty: 4 PEN | Refills: 2 | Status: CANCELLED | OUTPATIENT
Start: 2022-01-19

## 2022-01-19 NOTE — TELEPHONE ENCOUNTER
Refill request for TRULICITY medication. Name of Pharmacy- [facility]      Last visit - 8-     Pending visit - 3-1-2021    Last refill - 11-      Medication Contract signed -   Last Og Yap ran-         Additional Comments     Called patient and she stated that she needs another Rx of this medicine because she forgot her Rx that she just picked up for a month supply and she can't get to them. She is in [place], [place] because her Dad just fell and she is with him.

## 2022-01-20 RX ORDER — DULAGLUTIDE 3 MG/.5ML
3 INJECTION, SOLUTION SUBCUTANEOUS WEEKLY
Qty: 4 PEN | Refills: 2 | Status: SHIPPED | OUTPATIENT
Start: 2022-01-20 | End: 2022-05-05

## 2022-01-20 NOTE — TELEPHONE ENCOUNTER
Refill request for TRULICITY medication. Name of Pharmacy- [facility]      Last visit - 8-     Pending visit - 3-1-2022    Last refill - 11-      Medication Contract signed -   Liborio contreras-        Additional Comments     Patient is in [place], [place] with her Dad because he fell and she will be there for awhile. Needs Rx sent there to [facility]. Has a Rx at home, but has no way to get to it.

## 2022-02-03 DIAGNOSIS — F41.1 GAD (GENERALIZED ANXIETY DISORDER): ICD-10-CM

## 2022-02-07 RX ORDER — CYANOCOBALAMIN 1000 UG/ML
INJECTION INTRAMUSCULAR; SUBCUTANEOUS
Qty: 1 ML | Refills: 11 | Status: SHIPPED | OUTPATIENT
Start: 2022-02-07 | End: 2022-02-13 | Stop reason: SDUPTHER

## 2022-02-07 RX ORDER — ALPRAZOLAM 1 MG/1
1 TABLET ORAL NIGHTLY PRN
Qty: 30 TABLET | Refills: 2 | Status: SHIPPED | OUTPATIENT
Start: 2022-02-07 | End: 2022-03-15 | Stop reason: SDUPTHER

## 2022-02-07 NOTE — TELEPHONE ENCOUNTER
[phone] (home) [phone] (work)  [place] have been approved. We apologize for the delay due to weather.

## 2022-02-14 RX ORDER — CYANOCOBALAMIN 1000 UG/ML
INJECTION INTRAMUSCULAR; SUBCUTANEOUS
Qty: 1 ML | Refills: 11 | Status: SHIPPED | OUTPATIENT
Start: 2022-02-14 | End: 2022-07-18 | Stop reason: SDUPTHER

## 2022-02-14 NOTE — TELEPHONE ENCOUNTER
Refill request for cyancolbalamin  medication.      Name of Pharmacy- [facility]      Last visit - 8-     Pending visit - 3-    Last refill -2-7-2022      Medication Contract signed -   Last Og Yap ran-         Additional Comments

## 2022-03-15 ENCOUNTER — HOSPITAL ENCOUNTER (OUTPATIENT)
Age: 52
Discharge: HOME | End: 2022-03-15
Payer: COMMERCIAL

## 2022-03-15 ENCOUNTER — OFFICE VISIT (OUTPATIENT)
Dept: INTERNAL MEDICINE CLINIC | Age: 52
End: 2022-03-15
Payer: COMMERCIAL

## 2022-03-15 VITALS
WEIGHT: 202 LBS | DIASTOLIC BLOOD PRESSURE: 84 MMHG | BODY MASS INDEX: 35.22 KG/M2 | SYSTOLIC BLOOD PRESSURE: 126 MMHG | OXYGEN SATURATION: 98 % | TEMPERATURE: 98 F | HEART RATE: 76 BPM

## 2022-03-15 DIAGNOSIS — E06.3 HASHIMOTO'S THYROIDITIS: ICD-10-CM

## 2022-03-15 DIAGNOSIS — F41.1 GAD (GENERALIZED ANXIETY DISORDER): ICD-10-CM

## 2022-03-15 DIAGNOSIS — E78.2 MIXED HYPERLIPIDEMIA: ICD-10-CM

## 2022-03-15 DIAGNOSIS — E11.9 TYPE 2 DIABETES MELLITUS WITHOUT COMPLICATION, WITHOUT LONG-TERM CURRENT USE OF INSULIN (HCC): ICD-10-CM

## 2022-03-15 DIAGNOSIS — E11.9 TYPE 2 DIABETES MELLITUS WITHOUT COMPLICATION, WITHOUT LONG-TERM CURRENT USE OF INSULIN (HCC): Primary | ICD-10-CM

## 2022-03-15 DIAGNOSIS — I10 ESSENTIAL HYPERTENSION: ICD-10-CM

## 2022-03-15 LAB
A/G RATIO: 2 (ref 1.1–2.2)
ALBUMIN SERPL-MCNC: 4.8 G/DL (ref 3.4–5)
ALP BLD-CCNC: 103 U/L (ref 40–129)
ALT SERPL-CCNC: 9 U/L (ref 10–40)
ANION GAP SERPL CALCULATED.3IONS-SCNC: 18 MMOL/L (ref 3–16)
AST SERPL-CCNC: 13 U/L (ref 15–37)
BILIRUB SERPL-MCNC: 0.3 MG/DL (ref 0–1)
BUN BLDV-MCNC: 14 MG/DL (ref 7–20)
CALCIUM SERPL-MCNC: 10.1 MG/DL (ref 8.3–10.6)
CHLORIDE BLD-SCNC: 98 MMOL/L (ref 99–110)
CHOLESTEROL, TOTAL: 267 MG/DL (ref 0–199)
CO2: 24 MMOL/L (ref 21–32)
CREAT SERPL-MCNC: 0.6 MG/DL (ref 0.6–1.1)
GFR AFRICAN AMERICAN: >60
GFR NON-AFRICAN AMERICAN: >60
GLUCOSE BLD-MCNC: 136 MG/DL (ref 70–99)
HDLC SERPL-MCNC: 65 MG/DL (ref 40–60)
LDL CHOLESTEROL CALCULATED: 171 MG/DL
POTASSIUM SERPL-SCNC: 4.6 MMOL/L (ref 3.5–5.1)
SODIUM BLD-SCNC: 140 MMOL/L (ref 136–145)
T4 FREE: 1.2 NG/DL (ref 0.9–1.8)
TOTAL PROTEIN: 7.2 G/DL (ref 6.4–8.2)
TRIGL SERPL-MCNC: 155 MG/DL (ref 0–150)
TSH REFLEX: 4.72 UIU/ML (ref 0.27–4.2)
VLDLC SERPL CALC-MCNC: 31 MG/DL

## 2022-03-15 PROCEDURE — 83036 HEMOGLOBIN GLYCOSYLATED A1C: CPT

## 2022-03-15 PROCEDURE — 80061 LIPID PANEL: CPT

## 2022-03-15 PROCEDURE — 36415 COLL VENOUS BLD VENIPUNCTURE: CPT

## 2022-03-15 PROCEDURE — 84439 ASSAY OF FREE THYROXINE: CPT

## 2022-03-15 PROCEDURE — 80053 COMPREHEN METABOLIC PANEL: CPT

## 2022-03-15 PROCEDURE — 99214 OFFICE O/P EST MOD 30 MIN: CPT | Performed by: FAMILY MEDICINE

## 2022-03-15 PROCEDURE — 84443 ASSAY THYROID STIM HORMONE: CPT

## 2022-03-15 RX ORDER — ALPRAZOLAM 1 MG/1
1-1.5 TABLET ORAL NIGHTLY PRN
Qty: 45 TABLET | Refills: 2 | Status: SHIPPED | OUTPATIENT
Start: 2022-03-15 | End: 2022-08-09

## 2022-03-15 ASSESSMENT — PATIENT HEALTH QUESTIONNAIRE - PHQ9
SUM OF ALL RESPONSES TO PHQ QUESTIONS 1-9: 0
4. FEELING TIRED OR HAVING LITTLE ENERGY: 0
9. THOUGHTS THAT YOU WOULD BE BETTER OFF DEAD, OR OF HURTING YOURSELF: 0
SUM OF ALL RESPONSES TO PHQ QUESTIONS 1-9: 0
1. LITTLE INTEREST OR PLEASURE IN DOING THINGS: 0
10. IF YOU CHECKED OFF ANY PROBLEMS, HOW DIFFICULT HAVE THESE PROBLEMS MADE IT FOR YOU TO DO YOUR WORK, TAKE CARE OF THINGS AT HOME, OR GET ALONG WITH OTHER PEOPLE: 0
6. FEELING BAD ABOUT YOURSELF - OR THAT YOU ARE A FAILURE OR HAVE LET YOURSELF OR YOUR FAMILY DOWN: 0
SUM OF ALL RESPONSES TO PHQ QUESTIONS 1-9: 0
7. TROUBLE CONCENTRATING ON THINGS, SUCH AS READING THE NEWSPAPER OR WATCHING TELEVISION: 0
SUM OF ALL RESPONSES TO PHQ QUESTIONS 1-9: 0
2. FEELING DOWN, DEPRESSED OR HOPELESS: 0
8. MOVING OR SPEAKING SO SLOWLY THAT OTHER PEOPLE COULD HAVE NOTICED. OR THE OPPOSITE, BEING SO FIGETY OR RESTLESS THAT YOU HAVE BEEN MOVING AROUND A LOT MORE THAN USUAL: 0
SUM OF ALL RESPONSES TO PHQ9 QUESTIONS 1 & 2: 0
5. POOR APPETITE OR OVEREATING: 0
3. TROUBLE FALLING OR STAYING ASLEEP: 0

## 2022-03-15 ASSESSMENT — ENCOUNTER SYMPTOMS
SORE THROAT: 0
EYE REDNESS: 0
VOMITING: 0
DIARRHEA: 0
COUGH: 0
ABDOMINAL PAIN: 0
RHINORRHEA: 0
SINUS PRESSURE: 0
SHORTNESS OF BREATH: 0
WHEEZING: 0
TROUBLE SWALLOWING: 0
EYE DISCHARGE: 0
BACK PAIN: 0
CONSTIPATION: 0
EYE PAIN: 0
CHEST TIGHTNESS: 0
NAUSEA: 0

## 2022-03-15 NOTE — PATIENT INSTRUCTIONS
Continue current medicines. Continue healthy lifestyle changes (diet/exercise/attempt weight loss). Return in about 6 months (around 9/15/2022) for Fasting recehck DM, BP, lipids, thyroid. Patient Education        Learning About Meal Planning for Diabetes  Why plan your meals? Meal planning can be a key part of managing diabetes. Planning meals and snacks with the right balance of carbohydrate, protein, and fat can help you keep your blood sugar at the target level you set with your doctor. You don't have to eat special foods. You can eat what your family eats, including sweets once in a while. But you do have to pay attention to how often you eat and how much you eat of certain foods. You may want to work with a dietitian or a certified diabetes educator. He or she can give you tips and meal ideas and can answer your questions about meal planning. This health professional can also help you reach a healthy weight if that is one of your goals. What plan is right for you? Your dietitian or diabetes educator may suggest that you start with the plate format or carbohydrate counting. The plate format  The plate format is a simple way to help you manage how you eat. You plan meals by learning how much space each food should take on a plate. Using the plate format helps you spread carbohydrate throughout the day. It can make it easier to keep your blood sugar level within your target range. It also helps you see if you're eating healthy portion sizes. To use the plate format, you put non-starchy vegetables on half your plate. Add meat or meat substitutes on one-quarter of the plate. Put a grain or starchy vegetable (such as brown rice or a potato) on the final quarter of the plate.  You can add a small piece of fruit and some low-fat or fat-free milk or yogurt, depending on your carbohydrate goal for each meal.  Here are some tips for using the plate format:  · Make sure that you are not using an oversized plate. A 9-inch plate is best. Many restaurants use larger plates. · Get used to using the plate format at home. Then you can use it when you eat out. · Write down your questions about using the plate format. Talk to your doctor, a dietitian, or a diabetes educator about your concerns. Carbohydrate counting  With carbohydrate counting, you plan meals based on the amount of carbohydrate in each food. Carbohydrate raises blood sugar higher and more quickly than any other nutrient. It is found in desserts, breads and cereals, and fruit. It's also found in starchy vegetables such as potatoes and corn, grains such as rice and pasta, and milk and yogurt. Spreading carbohydrate throughout the day helps keep your blood sugar levels within your target range. Your daily amount depends on several things, including your weight, how active you are, which diabetes medicines you take, and what your goals are for your blood sugar levels. A registered dietitian or diabetes educator can help you plan how much carbohydrate to include in each meal and snack. A guideline for your daily amount of carbohydrate is:  · 45 to 60 grams at each meal. That's about the same as 3 to 4 carbohydrate servings. · 15 to 20 grams at each snack. That's about the same as 1 carbohydrate serving. The Nutrition Facts label on packaged foods tells you how much carbohydrate is in a serving of the food. First, look at the serving size on the food label. Is that the amount you eat in a serving? All of the nutrition information on a food label is based on that serving size. So if you eat more or less than that, you'll need to adjust the other numbers. Total carbohydrate is the next thing you need to look for on the label. If you count carbohydrate servings, one serving of carbohydrate is 15 grams. For foods that don't come with labels, such as fresh fruits and vegetables, you'll need a guide that lists carbohydrate in these foods.  Ask your doctor, dietitian, or diabetes educator about books or other nutrition guides you can use. If you take insulin, you need to know how many grams of carbohydrate are in a meal. This lets you know how much rapid-acting insulin to take before you eat. If you use an insulin pump, you get a constant rate of insulin during the day. So the pump must be programmed at meals to give you extra insulin to cover the rise in blood sugar after meals. When you know how much carbohydrate you will eat, you can take the right amount of insulin. Or, if you always use the same amount of insulin, you need to make sure that you eat the same amount of carbohydrate at meals. If you need more help to understand carbohydrate counting and food labels, ask your doctor, dietitian, or diabetes educator. How can you plan healthy meals? Here are some tips to get started:  · Plan your meals a week at a time. Don't forget to include snacks too. · Use cookbooks or online recipes to plan several main meals. Plan some quick meals for busy nights. You also can double some recipes that freeze well. Then you can save half for other busy nights when you don't have time to cook. · Make sure you have the ingredients you need for your recipes. If you're running low on basic items, put these items on your shopping list too. · List foods that you use to make breakfasts, lunches, and snacks. List plenty of fruits and vegetables. · Post this list on the refrigerator. Add to it as you think of more things you need. · Take the list to the store to do your weekly shopping. Follow-up care is a key part of your treatment and safety. Be sure to make and go to all appointments, and call your doctor if you are having problems. It's also a good idea to know your test results and keep a list of the medicines you take. Where can you learn more? Go to https://carlos.[employer]. org and sign in to your [employer] account.  Enter E882 in the [facility] box to learn more about \"Learning About Meal Planning for Diabetes. \"     If you do not have an account, please click on the \"Sign Up Now\" link. Current as of: September 8, 2021               Content Version: 13.1  © 8988-1936 [employer]. Care instructions adapted under license by [facility] ([facility]). If you have questions about a medical condition or this instruction, always ask your healthcare professional. [address] any warranty or liability for your use of this information.

## 2022-03-15 NOTE — PROGRESS NOTES
Subjective:      Patient ID: Adrianna Gonzalez is a 46 y. o.female who is being seen for   Chief Complaint   Patient presents with    Diabetes    Hypertension    Hyperlipidemia       HPI  Treatment Adherence:   Medication compliance:  compliant most of the time  Diet compliance:  compliant most of the time  Weight trend: decreasing  Current exercise: walks   Barriers: stress and time constraints    Diabetes Mellitus Type 2: Current symptoms/problems include none. Home blood sugar records: trend: stable  Anyepisodes of hypoglycemia? no  Eye exam current (within one year): unknown  Tobacco history: She  reports that she has never smoked. She has never used smokeless tobacco.   Daily Aspirin? Yes  Known diabetic complications: none    Hypertension:  Home blood pressure monitoring: Yes - usually 120's/70-80's. She is not strictly adherent to a low sodium diet. Patient denies chest pain, shortness of breath, headache, lightheadedness, blurred vision, peripheral edema, palpitations and dry cough. Antihypertensive medication side effects:no medication side effects noted. Use of agents associated with hypertension: none. Hyperlipidemia:  No current lipid lowering medication. H/O autoimmune hepatitis so has been reluctant to use statins. Lab Results   Component Value Date    LABA1C 7.2 08/24/2021    LABA1C 7.5 02/24/2021    LABA1C 7.6 10/21/2020     Lab Results   Component Value Date    LABMICR <1.20 10/21/2020    CREATININE 0.6 08/24/2021     Lab Results   Component Value Date    ALT 11 08/24/2021    AST 14 (L) 08/24/2021     Lab Results   Component Value Date    TRIG 176 08/24/2021    HDL 72 08/24/2021    HDL 62 05/10/2012    LDLCALC 172 08/24/2021        Hashimoto's thyroiditis  Diagnosed by Sharath Pittman when she found enlarged right thyroid. Monitoring for now. Has not needed medication.     AMANDA/Insomnia  Pt reports having to increase Xanax to 1.5 tablets at night when she is traveling in order to be able to sleep. Has been out of town a lot recently with father's health issues (fell, broke hip, lost a lot of weight and got very weak, battling UTI [id number]). He lives at [facility]. Also has been taking care of mother here. Patient's medications, past medical, surgical, social, and family historieswere reviewed by me personally and updated as appropriate. Outpatient Medications Marked as Taking for the 3/15/22 encounter (Office Visit) with Dejah Villareal MD   Medication Sig Dispense Refill    ALPRAZolam (XANAX) 1 MG tablet Take 1-1.5 tablets by mouth nightly as needed for Sleep for up to 90 days. 45 tablet 2    cyanocobalamin 1000 MCG/ML injection INJECT 1 ML INTO THE MUSCLE FOR 1 DOSE MONTHLY. 1 mL 11    Dulaglutide (TRULICITY) 3 XI/4.9II SOPN Inject 3 mg into the skin once a week 4 pen 2    sertraline (ZOLOFT) 25 MG tablet Take 1 tablet by mouth daily (in addition to a 50 mg pill daily) 90 tablet 3    pantoprazole (PROTONIX) 40 MG tablet TAKE 1 TABLET BY MOUTH EVERY DAY BEFORE BREAKFAST 90 tablet 1    sertraline (ZOLOFT) 50 MG tablet TAKE 1 TABLET BY MOUTH EVERY DAY 90 tablet 3    metFORMIN (GLUCOPHAGE-XR) 500 MG extended release tablet Take 4 tablets by mouth daily (with breakfast) 360 tablet 1    hydroCHLOROthiazide (HYDRODIURIL) 25 MG tablet TAKE 1 TABLET BY MOUTH EVERY DAY 90 tablet 1    Vitamins/Minerals TABS Take 1 tablet by mouth daily      blood glucose test strips (ASCENSIA AUTODISC VI;ONE TOUCH ULTRA TEST VI) strip Inject 1 each into the skin 4 times daily As needed. One Touch Ultra test strips. 150 each 11    Insulin Pen Needle (ULTICARE MINI PEN NEEDLES) 31G X 6 MM MISC USE 1 DAILY AS DIRECTED 100 each 5    aspirin 81 MG tablet Take 81 mg by mouth daily      Cholecalciferol (VITAMIN D3) 5000 UNITS CAPS Take 5,000 Int'l Units by mouth daily. Review of Systems  Review of Systems   Constitutional: Negative for fatigue, fever and unexpected weight change. appears well-developed and well-nourished. No distress. Head: Normocephalic and atraumatic. Ears: Normal bilateral external ears, ear canals, and tympanic membranes    Oropharyngeal exam: mucous membranes moist, pharynx normal without lesions. Neck: Normal range of motion. Neck supple. No thyroidmegaly. No Carotid bruits. Cardiovascular: Normal rate, regular rhythm, normal heart sounds and intact distal pulses. Pulmonary/Chest: Effort normal and breath sounds normal. No stridor. No respiratory distress. No wheezes and no rales. Abdominal: Soft. Bowel sounds are normal. No distension and no mass. No tenderness. No rebound and no guarding. Musculoskeletal: No edema and no tenderness. Skin: No rash or erythema. Psychiatric: Normal mood and affect. Behavior is normal.       Assessment       Diagnosis Orders   1. Type 2 diabetes mellitus without complication, without long-term current use of insulin (HCC)  Comprehensive Metabolic Panel   2. Hypertension, Essential  Comprehensive Metabolic Panel   3. Hyperlipidemia, Mixed  Comprehensive Metabolic Panel   4. Hashimoto's thyroiditis  TSH with Reflex   5. AMANDA (generalized anxiety disorder)  ALPRAZolam (XANAX) 1 MG tablet            Sarabjit Boston was seen today for diabetes, hypertension and hyperlipidemia. Diagnoses and all orders for this visit:    Type 2 diabetes mellitus without complication, without long-term current use of insulin (HCC)  -     Comprehensive Metabolic Panel; Future  Had A1C and lipids drawn this morning (ordered by Bria Norris). Will add on CMP. BG stable. Continue current medicines. If A1C elevated on today's check, will increase Trulicity. Hypertension, Essential  -     Comprehensive Metabolic Panel; Future  Stable overall. DBP slightly elevated today. Goal BP <130/80. Continue current medicines. Continue healthy lifestyle changes (diet/exercise/attempt weight loss).     Hyperlipidemia, Mixed  -     Comprehensive Metabolic Panel; Future  Lipids drawn this am (ordered by Anabela Jones). Continue healthy lifestyle changes (diet/exercise/attempt weight loss). Hashimoto's thyroiditis  -     TSH with Reflex; Future  No abnormality appreciated on thyroid exam today. Check TSH as above. AMANDA (generalized anxiety disorder)  -     ALPRAZolam (XANAX) 1 MG tablet; Take 1-1.5 tablets by mouth nightly as needed for Sleep for up to 90 days. OK to use 1-1.5 tablets of Xanax (1 mg) prn sleep. New Rx with updated quantity sent to pharmacy. Return in about 6 months (around 9/15/2022) for Fasting recehck DM, BP, lipids, thyroid.     Time spent on encounter: 30 minutes

## 2022-03-16 LAB
ESTIMATED AVERAGE GLUCOSE: 148.5 MG/DL
HBA1C MFR BLD: 6.8 %

## 2022-03-24 ENCOUNTER — PATIENT MESSAGE (OUTPATIENT)
Dept: INTERNAL MEDICINE CLINIC | Age: 52
End: 2022-03-24

## 2022-03-24 RX ORDER — AMOXICILLIN AND CLAVULANATE POTASSIUM 875; 125 MG/1; MG/1
1 TABLET, FILM COATED ORAL 2 TIMES DAILY
Qty: 20 TABLET | Refills: 0 | Status: SHIPPED | OUTPATIENT
Start: 2022-03-24 | End: 2022-04-03

## 2022-03-24 NOTE — TELEPHONE ENCOUNTER
From: Jackie De Oliveira  To: Dr. Christopher Mccollum: 3/24/2022 9:59 AM EDT  Subject: Sinus infection     Dr Mikayla Plaza I just got back into town and I have a terrible sinus infection, inner ear infection, can you call me in something? My face, teeth, ears and head all hurt.  Thanks Johanna Anna

## 2022-05-05 RX ORDER — DULAGLUTIDE 3 MG/.5ML
3 INJECTION, SOLUTION SUBCUTANEOUS WEEKLY
Qty: 6 ML | Refills: 1 | Status: SHIPPED | OUTPATIENT
Start: 2022-05-05 | End: 2022-07-18 | Stop reason: SDUPTHER

## 2022-05-05 NOTE — TELEPHONE ENCOUNTER
Refill request for TRULICITY medication.      Name of Pharmacy- [facility]      Last visit - 3/15/22     Pending visit - 9/13/22    Last refill -4/3/22      Medication Contract signed -   Last Oarrs ran-         Additional Comments

## 2022-05-06 RX ORDER — DULAGLUTIDE 3 MG/.5ML
3 INJECTION, SOLUTION SUBCUTANEOUS WEEKLY
Qty: 6 ML | Refills: 1 | OUTPATIENT
Start: 2022-05-06

## 2022-06-09 DIAGNOSIS — R60.9 EDEMA, UNSPECIFIED TYPE: ICD-10-CM

## 2022-06-09 DIAGNOSIS — I10 ESSENTIAL HYPERTENSION: ICD-10-CM

## 2022-06-09 RX ORDER — HYDROCHLOROTHIAZIDE 25 MG/1
TABLET ORAL
Qty: 90 TABLET | Refills: 1 | Status: SHIPPED | OUTPATIENT
Start: 2022-06-09 | End: 2022-09-21

## 2022-06-09 NOTE — TELEPHONE ENCOUNTER
Refill request for HCTZ medication.      Name of Pharmacy- [facility] [place]      Last visit - 3/15/22     Pending visit - 9/13/22    Last refill -11/5/21      Medication Contract signed -   Last Oarrs ran-         Additional Comments

## 2022-06-23 RX ORDER — PANTOPRAZOLE SODIUM 40 MG/1
TABLET, DELAYED RELEASE ORAL
Qty: 90 TABLET | Refills: 1 | Status: SHIPPED | OUTPATIENT
Start: 2022-06-23

## 2022-07-13 ENCOUNTER — PATIENT MESSAGE (OUTPATIENT)
Dept: INTERNAL MEDICINE CLINIC | Age: 52
End: 2022-07-13

## 2022-07-13 NOTE — TELEPHONE ENCOUNTER
From: Alejandra Hull  To: Dr. Shane Folds: 7/13/2022 12:18 PM EDT  Subject: Inner ear infection     Dr Federica Bob, Im pretty sure I have an inner ear infection. I can hear fluid and if I bend over I get dizzy. Its been about 3 weeks. Can you call me in something? Nothing I do helps, its getting worse.  Thank, Laine Bender

## 2022-07-14 RX ORDER — AMOXICILLIN AND CLAVULANATE POTASSIUM 875; 125 MG/1; MG/1
1 TABLET, FILM COATED ORAL 2 TIMES DAILY
Qty: 20 TABLET | Refills: 0 | Status: SHIPPED | OUTPATIENT
Start: 2022-07-14 | End: 2022-07-24

## 2022-07-18 RX ORDER — DULAGLUTIDE 3 MG/.5ML
3 INJECTION, SOLUTION SUBCUTANEOUS WEEKLY
Qty: 6 ML | Refills: 1 | Status: SHIPPED | OUTPATIENT
Start: 2022-07-18 | End: 2022-10-08 | Stop reason: SDUPTHER

## 2022-07-18 RX ORDER — CYANOCOBALAMIN 1000 UG/ML
INJECTION INTRAMUSCULAR; SUBCUTANEOUS
Qty: 1 ML | Refills: 11 | Status: SHIPPED | OUTPATIENT
Start: 2022-07-18 | End: 2022-09-27 | Stop reason: SDUPTHER

## 2022-07-18 NOTE — TELEPHONE ENCOUNTER
Refill request for Dulaglutide (TRULICITY) 3 KI/2.5FH SOPN, cyanocobalamin 1000 MCG/ML injection. medication.      Name of Sophie Flores      Last visit - 8/24/22     Pending visit - 9/13/22    Last refill - 1/20/22, 2/14/22      Medication Contract signed - PDMP Monitoring:    Last PDMP Radha Cohen as Reviewed [facility]):  Review User Review Instant Review Result   DOROTHEA SERRANO 11/5/2021  3:33 PM Reviewed PDMP [1]     [unfilled]  Urine Drug Screenings (1 yr)       Drug screen multi urine  Collected: 11/30/2014  6:15 PM (Final result)                  Medication Contract and Consent for Opioid Use Documents Filed       Patient Documents       Type of Document Status Date Received Received By Description    Medication Contract [Status Missing]  ASHUTOSH KAUR Medication Agreement 6/18/15                   Last Oarrs ran-         Additional Comments

## 2022-08-08 DIAGNOSIS — F41.1 GAD (GENERALIZED ANXIETY DISORDER): ICD-10-CM

## 2022-08-08 NOTE — TELEPHONE ENCOUNTER
Refill request for ALPRAZOLAM 1 MG TABLET medication.      Name of Pharmacy- [facility]      Last visit - 3-     Pending visit - 9-    Last refill - 6-      Medication Contract signed -   Liborio contreras-         Additional Comments

## 2022-08-09 RX ORDER — ALPRAZOLAM 1 MG/1
1-1.5 TABLET ORAL NIGHTLY PRN
Qty: 45 TABLET | Refills: 2 | Status: SHIPPED | OUTPATIENT
Start: 2022-08-09 | End: 2022-11-07

## 2022-08-09 NOTE — TELEPHONE ENCOUNTER
Rx sent. Controlled Substance Monitoring:    Acute and Chronic Pain Monitoring:   RX Monitoring 8/9/2022   Attestation -   Periodic Controlled Substance Monitoring No signs of potential drug abuse or diversion identified.

## 2022-09-07 ENCOUNTER — HOSPITAL ENCOUNTER (OUTPATIENT)
Dept: WOMENS IMAGING | Age: 52
Discharge: HOME | End: 2022-09-07
Payer: COMMERCIAL

## 2022-09-07 DIAGNOSIS — Z12.31 ENCOUNTER FOR SCREENING MAMMOGRAM FOR BREAST CANCER: ICD-10-CM

## 2022-09-07 PROCEDURE — 77067 SCR MAMMO BI INCL CAD: CPT

## 2022-09-08 ENCOUNTER — TELEPHONE (OUTPATIENT)
Dept: WOMENS IMAGING | Age: 52
End: 2022-09-08

## 2022-09-08 NOTE — TELEPHONE ENCOUNTER
Left message requesting return call. Calling regarding screening mammogram follow-up.     Raleigh Dee, VITALIY, CN-BM  Patient [address]  [phone]

## 2022-09-13 ENCOUNTER — HOSPITAL ENCOUNTER (OUTPATIENT)
Dept: WOMENS IMAGING | Age: 52
Discharge: HOME | End: 2022-09-13
Payer: COMMERCIAL

## 2022-09-13 ENCOUNTER — HOSPITAL ENCOUNTER (OUTPATIENT)
Dept: WOMENS IMAGING | Age: 52
End: 2022-09-13
Payer: COMMERCIAL

## 2022-09-13 DIAGNOSIS — R92.8 ABNORMAL MAMMOGRAM: ICD-10-CM

## 2022-09-13 PROCEDURE — G0279 TOMOSYNTHESIS, MAMMO: HCPCS

## 2022-09-15 ENCOUNTER — APPOINTMENT (OUTPATIENT)
Dept: GENERAL RADIOLOGY | Age: 52
End: 2022-09-15
Payer: COMMERCIAL

## 2022-09-15 ENCOUNTER — HOSPITAL ENCOUNTER (EMERGENCY)
Age: 52
Discharge: HOME | End: 2022-09-15
Attending: EMERGENCY MEDICINE
Payer: COMMERCIAL

## 2022-09-15 VITALS
HEART RATE: 71 BPM | WEIGHT: 180 LBS | BODY MASS INDEX: 31.89 KG/M2 | HEIGHT: 63 IN | DIASTOLIC BLOOD PRESSURE: 72 MMHG | OXYGEN SATURATION: 97 % | SYSTOLIC BLOOD PRESSURE: 118 MMHG | RESPIRATION RATE: 16 BRPM | TEMPERATURE: 98 F

## 2022-09-15 DIAGNOSIS — R51.9 NONINTRACTABLE HEADACHE, UNSPECIFIED CHRONICITY PATTERN, UNSPECIFIED HEADACHE TYPE: ICD-10-CM

## 2022-09-15 DIAGNOSIS — R07.89 CHEST WALL PAIN: Primary | ICD-10-CM

## 2022-09-15 DIAGNOSIS — S46.812A TRAPEZIUS MUSCLE STRAIN, LEFT, INITIAL ENCOUNTER: ICD-10-CM

## 2022-09-15 DIAGNOSIS — M94.0 COSTOCHONDRITIS: ICD-10-CM

## 2022-09-15 LAB
A/G RATIO: 1.8 (ref 1.1–2.2)
ALBUMIN SERPL-MCNC: 5.2 G/DL (ref 3.4–5)
ALP BLD-CCNC: 102 U/L (ref 40–129)
ALT SERPL-CCNC: 15 U/L (ref 10–40)
ANION GAP SERPL CALCULATED.3IONS-SCNC: 14 MMOL/L (ref 3–16)
AST SERPL-CCNC: 19 U/L (ref 15–37)
BACTERIA: ABNORMAL /HPF
BASOPHILS ABSOLUTE: 0.1 K/UL (ref 0–0.2)
BASOPHILS RELATIVE PERCENT: 0.8 %
BILIRUB SERPL-MCNC: 0.3 MG/DL (ref 0–1)
BILIRUBIN URINE: NEGATIVE
BLOOD, URINE: NEGATIVE
BUN BLDV-MCNC: 14 MG/DL (ref 7–20)
CALCIUM SERPL-MCNC: 10 MG/DL (ref 8.3–10.6)
CHLORIDE BLD-SCNC: 91 MMOL/L (ref 99–110)
CLARITY: CLEAR
CO2: 28 MMOL/L (ref 21–32)
COLOR: YELLOW
CREAT SERPL-MCNC: 0.7 MG/DL (ref 0.6–1.1)
EKG ATRIAL RATE: 77 BPM
EKG DIAGNOSIS: NORMAL
EKG P AXIS: 54 DEGREES
EKG P-R INTERVAL: 178 MS
EKG Q-T INTERVAL: 376 MS
EKG QRS DURATION: 72 MS
EKG QTC CALCULATION (BAZETT): 425 MS
EKG R AXIS: 28 DEGREES
EKG T AXIS: 48 DEGREES
EKG VENTRICULAR RATE: 77 BPM
EOSINOPHILS ABSOLUTE: 0.1 K/UL (ref 0–0.6)
EOSINOPHILS RELATIVE PERCENT: 1.3 %
EPITHELIAL CELLS, UA: ABNORMAL /HPF (ref 0–5)
GFR AFRICAN AMERICAN: >60
GFR NON-AFRICAN AMERICAN: >60
GLUCOSE BLD-MCNC: 140 MG/DL (ref 70–99)
GLUCOSE URINE: NEGATIVE MG/DL
HCT VFR BLD CALC: 44.1 % (ref 36–48)
HEMOGLOBIN: 14.7 G/DL (ref 12–16)
KETONES, URINE: NEGATIVE MG/DL
LEUKOCYTE ESTERASE, URINE: ABNORMAL
LYMPHOCYTES ABSOLUTE: 2.4 K/UL (ref 1–5.1)
LYMPHOCYTES RELATIVE PERCENT: 25.4 %
MCH RBC QN AUTO: 29.5 PG (ref 26–34)
MCHC RBC AUTO-ENTMCNC: 33.3 G/DL (ref 31–36)
MCV RBC AUTO: 88.8 FL (ref 80–100)
MICROSCOPIC EXAMINATION: YES
MONOCYTES ABSOLUTE: 0.7 K/UL (ref 0–1.3)
MONOCYTES RELATIVE PERCENT: 7.1 %
NEUTROPHILS ABSOLUTE: 6.2 K/UL (ref 1.7–7.7)
NEUTROPHILS RELATIVE PERCENT: 65.4 %
NITRITE, URINE: NEGATIVE
PDW BLD-RTO: 13 % (ref 12.4–15.4)
PH UA: 6 (ref 5–8)
PLATELET # BLD: 355 K/UL (ref 135–450)
PMV BLD AUTO: 7.9 FL (ref 5–10.5)
POTASSIUM SERPL-SCNC: 4 MMOL/L (ref 3.5–5.1)
PROTEIN UA: NEGATIVE MG/DL
RBC # BLD: 4.96 M/UL (ref 4–5.2)
RBC UA: ABNORMAL /HPF (ref 0–4)
SODIUM BLD-SCNC: 133 MMOL/L (ref 136–145)
SPECIFIC GRAVITY UA: <=1.005 (ref 1–1.03)
TOTAL PROTEIN: 8.1 G/DL (ref 6.4–8.2)
TROPONIN: <0.01 NG/ML
URINE REFLEX TO CULTURE: ABNORMAL
URINE TYPE: ABNORMAL
UROBILINOGEN, URINE: 0.2 E.U./DL
WBC # BLD: 9.5 K/UL (ref 4–11)
WBC UA: ABNORMAL /HPF (ref 0–5)

## 2022-09-15 PROCEDURE — 96375 TX/PRO/DX INJ NEW DRUG ADDON: CPT

## 2022-09-15 PROCEDURE — 81001 URINALYSIS AUTO W/SCOPE: CPT

## 2022-09-15 PROCEDURE — 85025 COMPLETE CBC W/AUTO DIFF WBC: CPT

## 2022-09-15 PROCEDURE — 93005 ELECTROCARDIOGRAM TRACING: CPT | Performed by: EMERGENCY MEDICINE

## 2022-09-15 PROCEDURE — 96374 THER/PROPH/DIAG INJ IV PUSH: CPT

## 2022-09-15 PROCEDURE — 84484 ASSAY OF TROPONIN QUANT: CPT

## 2022-09-15 PROCEDURE — 6360000002 HC RX W HCPCS: Performed by: PHYSICIAN ASSISTANT

## 2022-09-15 PROCEDURE — 99285 EMERGENCY DEPT VISIT HI MDM: CPT

## 2022-09-15 PROCEDURE — 80053 COMPREHEN METABOLIC PANEL: CPT

## 2022-09-15 PROCEDURE — 2580000003 HC RX 258: Performed by: PHYSICIAN ASSISTANT

## 2022-09-15 PROCEDURE — 71045 X-RAY EXAM CHEST 1 VIEW: CPT

## 2022-09-15 PROCEDURE — 93010 ELECTROCARDIOGRAM REPORT: CPT | Performed by: INTERNAL MEDICINE

## 2022-09-15 RX ORDER — LIDOCAINE 50 MG/G
1 PATCH TOPICAL DAILY
Qty: 10 PATCH | Refills: 0 | Status: SHIPPED | OUTPATIENT
Start: 2022-09-15 | End: 2022-09-25

## 2022-09-15 RX ORDER — IBUPROFEN 800 MG/1
800 TABLET ORAL 3 TIMES DAILY PRN
Qty: 90 TABLET | Refills: 0 | Status: SHIPPED | OUTPATIENT
Start: 2022-09-15

## 2022-09-15 RX ORDER — ACETAMINOPHEN 500 MG
500 TABLET ORAL 4 TIMES DAILY PRN
Qty: 120 TABLET | Refills: 0 | Status: SHIPPED | OUTPATIENT
Start: 2022-09-15

## 2022-09-15 RX ORDER — 0.9 % SODIUM CHLORIDE 0.9 %
1000 INTRAVENOUS SOLUTION INTRAVENOUS ONCE
Status: COMPLETED | OUTPATIENT
Start: 2022-09-15 | End: 2022-09-15

## 2022-09-15 RX ORDER — KETOROLAC TROMETHAMINE 30 MG/ML
30 INJECTION, SOLUTION INTRAMUSCULAR; INTRAVENOUS ONCE
Status: COMPLETED | OUTPATIENT
Start: 2022-09-15 | End: 2022-09-15

## 2022-09-15 RX ORDER — METOCLOPRAMIDE HYDROCHLORIDE 5 MG/ML
10 INJECTION INTRAMUSCULAR; INTRAVENOUS ONCE
Status: COMPLETED | OUTPATIENT
Start: 2022-09-15 | End: 2022-09-15

## 2022-09-15 RX ORDER — DIPHENHYDRAMINE HYDROCHLORIDE 50 MG/ML
25 INJECTION INTRAMUSCULAR; INTRAVENOUS ONCE
Status: COMPLETED | OUTPATIENT
Start: 2022-09-15 | End: 2022-09-15

## 2022-09-15 RX ADMIN — SODIUM CHLORIDE 1000 ML: 9 INJECTION, SOLUTION INTRAVENOUS at 12:59

## 2022-09-15 RX ADMIN — DIPHENHYDRAMINE HYDROCHLORIDE 25 MG: 50 INJECTION, SOLUTION INTRAMUSCULAR; INTRAVENOUS at 12:59

## 2022-09-15 RX ADMIN — KETOROLAC TROMETHAMINE 30 MG: 30 INJECTION, SOLUTION INTRAMUSCULAR; INTRAVENOUS at 13:00

## 2022-09-15 RX ADMIN — METOCLOPRAMIDE 10 MG: 5 INJECTION, SOLUTION INTRAMUSCULAR; INTRAVENOUS at 13:01

## 2022-09-15 ASSESSMENT — PAIN - FUNCTIONAL ASSESSMENT
PAIN_FUNCTIONAL_ASSESSMENT: NONE - DENIES PAIN
PAIN_FUNCTIONAL_ASSESSMENT: 0-10
PAIN_FUNCTIONAL_ASSESSMENT: NONE - DENIES PAIN
PAIN_FUNCTIONAL_ASSESSMENT: NONE - DENIES PAIN
PAIN_FUNCTIONAL_ASSESSMENT: 0-10

## 2022-09-15 ASSESSMENT — PAIN DESCRIPTION - LOCATION
LOCATION: CHEST
LOCATION: CHEST

## 2022-09-15 ASSESSMENT — PAIN SCALES - GENERAL
PAINLEVEL_OUTOF10: 6
PAINLEVEL_OUTOF10: 7

## 2022-09-15 ASSESSMENT — LIFESTYLE VARIABLES: HOW OFTEN DO YOU HAVE A DRINK CONTAINING ALCOHOL: NEVER

## 2022-09-15 NOTE — ED PROVIDER NOTES
I was not asked to see this patient. I was available for consultation if deemed necessary. I was only asked to interpret the EKG. Please see ISIDRA Parrish's note for care of the patient while in the emergency department and for final disposition. The Ekg interpreted by me shows  normal sinus rhythm with a rate of 77  Axis is   Normal  QTc is  normal  Intervals and Durations are unremarkable.       ST Segments: no acute change and nonspecific changes  No significant change from prior EKG dated - 2/23/21  No STEMI               Robinson Black MD  09/15/22 5618

## 2022-09-16 NOTE — ED PROVIDER NOTES
Emergency Department Attending Provider Note  Location: [facility]  ED  9/15/2022     Patient Identification  Escobar Owens is a 46 y.o. female      Graham Irene was evaluated in the Emergency Department for chest wall pain/sternal pain. Seem to notice this developing after she played tennis in [place] on vacation. She denies any exertional symptoms or pleurisy. Although initial history and physical exam information was obtained by SANDER/NPP/MD/DO (who also dictated a record of this visit), I personally saw the patient and performed a substantive portion of the visit including all aspects of the medical decision making. EKG Interpretation  Interpreted by Dr. Christelle Lerma prior physician    Patient seen and evaluated. Relevant records reviewed. 61-year-old female presents with chest/chest wall pain. On exam she is well-appearing no acute distress reassuring vitals. She has very reproducible sternal and anterior chest wall tenderness consistent with costochondritis or other chest wall pain. Her exam is not consistent with ACS PE dissection. Discussed supportive care follow-up and return precautions. Patient agreeable to plan expressed understanding plan. I, Dr. Aby Still, am the primary clinician of record. I personally saw the patient and independently provided 0 minutes of non-concurrent critical care out of the total shared critical care time provided. This chart was generated in part by using Dragon Dictation system and may contain errors related to that system including errors in grammar, punctuation, and spelling, as well as words and phrases that may be inappropriate. If there are any questions or concerns please feel free to contact the dictating provider for clarification.      MD Iraj Alberto MD  09/16/22 1039

## 2022-09-19 ENCOUNTER — OFFICE VISIT (OUTPATIENT)
Dept: INTERNAL MEDICINE CLINIC | Age: 52
End: 2022-09-19
Payer: COMMERCIAL

## 2022-09-19 ENCOUNTER — HOSPITAL ENCOUNTER (OUTPATIENT)
Age: 52
Discharge: HOME | End: 2022-09-19
Payer: COMMERCIAL

## 2022-09-19 VITALS
TEMPERATURE: 97.2 F | SYSTOLIC BLOOD PRESSURE: 122 MMHG | DIASTOLIC BLOOD PRESSURE: 78 MMHG | BODY MASS INDEX: 36.85 KG/M2 | HEART RATE: 72 BPM | OXYGEN SATURATION: 98 % | WEIGHT: 208 LBS

## 2022-09-19 DIAGNOSIS — E11.9 TYPE 2 DIABETES MELLITUS WITHOUT COMPLICATION, WITHOUT LONG-TERM CURRENT USE OF INSULIN (HCC): Primary | ICD-10-CM

## 2022-09-19 DIAGNOSIS — N30.00 ACUTE CYSTITIS WITHOUT HEMATURIA: ICD-10-CM

## 2022-09-19 DIAGNOSIS — R53.83 FATIGUE, UNSPECIFIED TYPE: ICD-10-CM

## 2022-09-19 DIAGNOSIS — E11.9 TYPE 2 DIABETES MELLITUS WITHOUT COMPLICATION, WITHOUT LONG-TERM CURRENT USE OF INSULIN (HCC): ICD-10-CM

## 2022-09-19 DIAGNOSIS — Z23 NEED FOR IMMUNIZATION AGAINST INFLUENZA: ICD-10-CM

## 2022-09-19 DIAGNOSIS — E78.2 MIXED HYPERLIPIDEMIA: ICD-10-CM

## 2022-09-19 DIAGNOSIS — I10 ESSENTIAL HYPERTENSION: ICD-10-CM

## 2022-09-19 DIAGNOSIS — R79.89 ELEVATED TSH: ICD-10-CM

## 2022-09-19 DIAGNOSIS — Z23 NEED FOR PNEUMOCOCCAL VACCINATION: ICD-10-CM

## 2022-09-19 PROBLEM — N95.1 SYMPTOMATIC MENOPAUSAL OR FEMALE CLIMACTERIC STATES: Status: ACTIVE | Noted: 2022-07-28

## 2022-09-19 LAB
A/G RATIO: 1.9 (ref 1.1–2.2)
ALBUMIN SERPL-MCNC: 4.7 G/DL (ref 3.4–5)
ALP BLD-CCNC: 81 U/L (ref 40–129)
ALT SERPL-CCNC: 9 U/L (ref 10–40)
ANION GAP SERPL CALCULATED.3IONS-SCNC: 13 MMOL/L (ref 3–16)
AST SERPL-CCNC: 17 U/L (ref 15–37)
BILIRUB SERPL-MCNC: 0.3 MG/DL (ref 0–1)
BUN BLDV-MCNC: 13 MG/DL (ref 7–20)
CALCIUM SERPL-MCNC: 9.8 MG/DL (ref 8.3–10.6)
CHLORIDE BLD-SCNC: 99 MMOL/L (ref 99–110)
CHOLESTEROL, TOTAL: 272 MG/DL (ref 0–199)
CO2: 25 MMOL/L (ref 21–32)
CREAT SERPL-MCNC: 0.5 MG/DL (ref 0.6–1.1)
GFR AFRICAN AMERICAN: >60
GFR NON-AFRICAN AMERICAN: >60
GLUCOSE BLD-MCNC: 109 MG/DL (ref 70–99)
HDLC SERPL-MCNC: 59 MG/DL (ref 40–60)
LDL CHOLESTEROL CALCULATED: 155 MG/DL
POTASSIUM SERPL-SCNC: 4.2 MMOL/L (ref 3.5–5.1)
SODIUM BLD-SCNC: 137 MMOL/L (ref 136–145)
TOTAL PROTEIN: 7.2 G/DL (ref 6.4–8.2)
TRIGL SERPL-MCNC: 291 MG/DL (ref 0–150)
TSH REFLEX: 2.73 UIU/ML (ref 0.27–4.2)
VLDLC SERPL CALC-MCNC: 58 MG/DL

## 2022-09-19 PROCEDURE — 90677 PCV20 VACCINE IM: CPT | Performed by: FAMILY MEDICINE

## 2022-09-19 PROCEDURE — 90674 CCIIV4 VAC NO PRSV 0.5 ML IM: CPT | Performed by: FAMILY MEDICINE

## 2022-09-19 PROCEDURE — 3044F HG A1C LEVEL LT 7.0%: CPT | Performed by: FAMILY MEDICINE

## 2022-09-19 PROCEDURE — 80061 LIPID PANEL: CPT

## 2022-09-19 PROCEDURE — 99214 OFFICE O/P EST MOD 30 MIN: CPT | Performed by: FAMILY MEDICINE

## 2022-09-19 PROCEDURE — 36415 COLL VENOUS BLD VENIPUNCTURE: CPT

## 2022-09-19 PROCEDURE — 84443 ASSAY THYROID STIM HORMONE: CPT

## 2022-09-19 PROCEDURE — 90472 IMMUNIZATION ADMIN EACH ADD: CPT | Performed by: FAMILY MEDICINE

## 2022-09-19 PROCEDURE — 90471 IMMUNIZATION ADMIN: CPT | Performed by: FAMILY MEDICINE

## 2022-09-19 PROCEDURE — 83036 HEMOGLOBIN GLYCOSYLATED A1C: CPT

## 2022-09-19 PROCEDURE — 80053 COMPREHEN METABOLIC PANEL: CPT

## 2022-09-19 RX ORDER — CIPROFLOXACIN 500 MG/1
500 TABLET, FILM COATED ORAL 2 TIMES DAILY
Qty: 6 TABLET | Refills: 0 | Status: SHIPPED | OUTPATIENT
Start: 2022-09-19 | End: 2022-09-22

## 2022-09-19 SDOH — ECONOMIC STABILITY: FOOD INSECURITY: WITHIN THE PAST 12 MONTHS, YOU WORRIED THAT YOUR FOOD WOULD RUN OUT BEFORE YOU GOT MONEY TO BUY MORE.: NEVER TRUE

## 2022-09-19 SDOH — ECONOMIC STABILITY: FOOD INSECURITY: WITHIN THE PAST 12 MONTHS, THE FOOD YOU BOUGHT JUST DIDN'T LAST AND YOU DIDN'T HAVE MONEY TO GET MORE.: NEVER TRUE

## 2022-09-19 ASSESSMENT — ENCOUNTER SYMPTOMS
EYE PAIN: 0
TROUBLE SWALLOWING: 0
EYE REDNESS: 0
VOMITING: 0
EYE DISCHARGE: 0
BACK PAIN: 0
RHINORRHEA: 0
SINUS PRESSURE: 0
SHORTNESS OF BREATH: 0
ABDOMINAL PAIN: 0
SORE THROAT: 0
CHEST TIGHTNESS: 0
WHEEZING: 0
COUGH: 0
DIARRHEA: 0
NAUSEA: 0
CONSTIPATION: 0

## 2022-09-19 ASSESSMENT — SOCIAL DETERMINANTS OF HEALTH (SDOH): HOW HARD IS IT FOR YOU TO PAY FOR THE VERY BASICS LIKE FOOD, HOUSING, MEDICAL CARE, AND HEATING?: VERY HARD

## 2022-09-19 NOTE — PROGRESS NOTES
Subjective:      Patient ID: Sukumar Nunes is a 46 y. o.female who is being seen for   Chief Complaint   Patient presents with    Thyroid Problem     Pt stated she sleeps regular and still is tired. It's been am ongoing problem. HPI  Treatment Adherence:   Medication compliance:  compliant most of the time  Diet compliance:  compliant most of the time  Weight trend: fluctuating  Current exercise: walks some  Barriers: lack of motivation, stress, and time constraints    Diabetes Mellitus Type 2: Current symptoms/problems include none. Home blood sugar records: fasting range: 110, postprandial range: 140-150's  Anyepisodes of hypoglycemia? no  Eye exam current (within one year): yes - Lens Compact Particle Acceleration  Tobacco history: She  reports that she has never smoked. She has never used smokeless tobacco.   Daily Aspirin? Yes  Known diabetic complications: none    Hypertension:  Home blood pressuremonitoring: Yes - usually 120's/70-80's. She is not strictly adherent to a low sodium diet. Patient denies chest pain, shortness of breath, lightheadedness, blurred vision, peripheral edema, palpitations, and dry cough. Seen in ER recently for HA but this has now subsided. Antihypertensive medication side effects:no medication side effects noted. Use of agents associated with hypertension: none. Hyperlipidemia: No current lipid lowering medication. H/O autoimmune hepatitis so has been reluctant to use statins.     Lab Results   Component Value Date    LABA1C 6.8 03/15/2022    LABA1C 7.2 08/24/2021    LABA1C 7.5 02/24/2021     Lab Results   Component Value Date    LABMICR YES 09/15/2022    CREATININE 0.7 09/15/2022     Lab Results   Component Value Date    ALT 15 09/15/2022    AST 19 09/15/2022     Lab Results   Component Value Date/Time    TRIG 155 03/15/2022 07:53 AM    HDL 65 03/15/2022 07:53 AM    HDL 62 05/10/2012 09:14 AM    LDLCALC 171 03/15/2022 07:53 AM        C/O fatigue despite getting a good amount of sleep. Last TSH was mildly elevated and pt wondering if it could be worse now. Seen at Urgent Care for UTI. Lisa Coffer but still feels like the sx have not completely resolved. Patient's medications, past medical, surgical, social, and family historieswere reviewed by me personally and updated as appropriate. Outpatient Medications Marked as Taking for the 9/19/22 encounter (Office Visit) with Alton Holcomb MD   Medication Sig Dispense Refill    Multiple Vitamin (MULTIVITAMIN PO) Take 1 tablet by mouth      acetaminophen (TYLENOL) 500 MG tablet Take 1 tablet by mouth 4 times daily as needed for Pain 120 tablet 0    ibuprofen (ADVIL;MOTRIN) 800 MG tablet Take 1 tablet by mouth 3 times daily as needed for Pain 90 tablet 0    lidocaine (LIDODERM) 5 % Place 1 patch onto the skin daily for 10 days 12 hours on, 12 hours off. 10 patch 0    ALPRAZolam (XANAX) 1 MG tablet TAKE 1-1.5 TABLETS BY MOUTH NIGHTLY AS NEEDED FOR SLEEP FOR UP TO 90 DAYS. 45 tablet 2    cyanocobalamin 1000 MCG/ML injection INJECT 1 ML INTO THE MUSCLE FOR 1 DOSE MONTHLY.  1 mL 11    Dulaglutide (TRULICITY) 3 GE/9.4RF SOPN Inject 3 mg into the skin once a week 6 mL 1    pantoprazole (PROTONIX) 40 MG tablet TAKE 1 TABLET BY MOUTH EVERY DAY BEFORE BREAKFAST 90 tablet 1    hydroCHLOROthiazide (HYDRODIURIL) 25 MG tablet TAKE 1 TABLET BY MOUTH EVERY DAY 90 tablet 1    sertraline (ZOLOFT) 25 MG tablet Take 1 tablet by mouth daily (in addition to a 50 mg pill daily) 90 tablet 3    sertraline (ZOLOFT) 50 MG tablet TAKE 1 TABLET BY MOUTH EVERY DAY 90 tablet 3    metFORMIN (GLUCOPHAGE-XR) 500 MG extended release tablet Take 4 tablets by mouth daily (with breakfast) 360 tablet 1    rosuvastatin (CRESTOR) 5 MG tablet TAKE 1 TABLET BY MOUTH EVERY DAY 30 tablet 5    Vitamins/Minerals TABS Take 1 tablet by mouth daily      blood glucose test strips (ASCENSIA AUTODISC VI;ONE TOUCH ULTRA TEST VI) strip Inject 1 each into the skin 4 times daily As needed. One Touch Ultra test strips. 150 each 11    Insulin Pen Needle (ULTICARE MINI PEN NEEDLES) 31G X 6 MM MISC USE 1 DAILY AS DIRECTED 100 each 5    aspirin 81 MG tablet Take 81 mg by mouth daily      Cholecalciferol (VITAMIN D3) 5000 UNITS CAPS Take 5,000 Int'l Units by mouth daily. Review of Systems  Review of Systems   Constitutional:  Positive for fatigue. Negative for fever and unexpected weight change. HENT:  Negative for congestion, ear pain, hearing loss, rhinorrhea, sinus pressure, sore throat and trouble swallowing. Eyes:  Negative for pain, discharge, redness and visual disturbance. Respiratory:  Negative for cough, chest tightness, shortness of breath and wheezing. Cardiovascular:  Negative for chest pain, palpitations and leg swelling. Gastrointestinal:  Negative for abdominal pain, constipation, diarrhea, nausea and vomiting. Endocrine: Negative for cold intolerance, heat intolerance, polydipsia, polyphagia and polyuria. Genitourinary:  Negative for dysuria, flank pain, menstrual problem, pelvic pain and vaginal discharge. Musculoskeletal:  Negative for arthralgias, back pain, gait problem, myalgias and neck pain. Skin:  Negative for rash and wound. Allergic/Immunologic: Negative for environmental allergies, food allergies and immunocompromised state. Neurological:  Negative for dizziness, seizures, syncope, weakness, numbness and headaches. Hematological:  Negative for adenopathy. Psychiatric/Behavioral:  Negative for agitation, confusion, dysphoric mood and sleep disturbance. The patient is not nervous/anxious.       Objective:   Physical Exam    Wt Readings from Last 3 Encounters:   09/19/22 208 lb (94.3 kg)   09/15/22 180 lb (81.6 kg)   03/15/22 202 lb (91.6 kg)       BP Readings from Last 3 Encounters:   09/19/22 122/78   09/15/22 118/72   03/15/22 126/84       Vitals:    09/19/22 0902   BP: 122/78   Pulse: 72   Temp: 97.2 °F (36.2 °C) SpO2: 98%       Physical Exam  Nursing note and vitals reviewed. Vitals:    09/19/22 0902   BP: 122/78   Site: Right Upper Arm   Position: Sitting   Cuff Size: Large Adult   Pulse: 72   Temp: 97.2 °F (36.2 °C)   TempSrc: Temporal   SpO2: 98%   Weight: 208 lb (94.3 kg)     Wt Readings from Last 3 Encounters:   09/19/22 208 lb (94.3 kg)   09/15/22 180 lb (81.6 kg)   03/15/22 202 lb (91.6 kg)     BP Readings from Last 3 Encounters:   09/19/22 122/78   09/15/22 118/72   03/15/22 126/84     Body mass index is 36.85 kg/m². Constitutional: Patient appears well-developed and well-nourished. No distress. Head: Normocephalic and atraumatic. Ears: Normal bilateral external ears, ear canals, and tympanic membranes    Oropharyngeal exam: mucous membranes moist, pharynx normal without lesions. Neck: Normal range of motion. Neck supple. No thyroidmegaly. No Carotid bruits. Cardiovascular: Normal rate, regular rhythm, normal heart sounds and intact distal pulses. Pulmonary/Chest: Effort normal and breath sounds normal. No stridor. No respiratory distress. No wheezes and no rales. Abdominal: Soft. Bowel sounds are normal. No distension and no mass. No tenderness. No rebound and no guarding. Musculoskeletal: No edema and no tenderness. Skin: No rash or erythema. Psychiatric: Normal mood and affect. Behavior is normal.   Feet: Sensory exam of the foot is normal, tested with the monofilament. Good pulses, no lesions or ulcers, good peripheral pulses. Assessment       Diagnosis Orders   1. Type 2 diabetes mellitus without complication, without long-term current use of insulin (HCC)  Comprehensive Metabolic Panel    Hemoglobin A1C     DIABETES FOOT EXAM    Microalbumin / Creatinine Urine Ratio      2. Hypertension, Essential  Comprehensive Metabolic Panel      3. Hyperlipidemia, Mixed  Comprehensive Metabolic Panel    Lipid Panel      4.  Acute cystitis without hematuria  ciprofloxacin (CIPRO) 500 MG tablet 5. Fatigue, unspecified type  TSH with Reflex      6. Elevated TSH  TSH with Reflex      7. Need for pneumococcal vaccination  Pneumococcal, PCV20, PREVNAR 21, (age 25 yrs+), IM, PF      8. Need for immunization against influenza  Influenza, FLUCELVAX, (age 10 mo+), IM, Preservative Free, 0.5 mL               Sarabjit Pimentel was seen today for thyroid problem. Diagnoses and all orders for this visit:    Type 2 diabetes mellitus without complication, without long-term current use of insulin (HCC)  -     Comprehensive Metabolic Panel; Future  -     Hemoglobin A1C; Future  -     HM DIABETES FOOT EXAM  -     Microalbumin / Creatinine Urine Ratio  Stable. Continue current medicines. Continue healthy lifestyle changes (diet/exercise/attempt weight loss). Labs as above. Patient education materials given in AVS re: counting carbs. Hypertension, Essential  -     Comprehensive Metabolic Panel; Future  Stable, well controlled. Continue current medicines. Continue healthy lifestyle changes (diet/exercise/attempt weight loss). Hyperlipidemia, Mixed  -     Comprehensive Metabolic Panel; Future  -     Lipid Panel; Future  Labs as above to check status. Continue healthy lifestyle changes (diet/exercise/attempt weight loss). Acute cystitis without hematuria  -     ciprofloxacin (CIPRO) 500 MG tablet; Take 1 tablet by mouth 2 times daily for 3 days  Still could be some residual infection per UA. Will treat with Cipro x3 days. Fatigue, unspecified type / Elevated TSH  -     TSH with Reflex; Future  Recheck TSH as above. Need for pneumococcal vaccination  -     Pneumococcal, PCV20, PREVNAR 21, (age 25 yrs+), IM, PF  Prevnar-20 given today    Need for immunization against influenza  -     Influenza, FLUCELVAX, (age 10 mo+), IM, Preservative Free, 0.5 mL  Flu shot given today. Return in about 6 months (around 3/19/2023) for Fasting recheck DM, BP, lipids (sooner if labs are abnormal).     Time spent on encounter: 30 minutes

## 2022-09-19 NOTE — ED PROVIDER NOTES
[facility] Emergency Department    CHIEF COMPLAINT  Chest Pain (Headache, neck pain, chest pain all started labor day weekend, took advil this am with no relief)      SHARED SERVICE VISIT  I have seen and evaluated this patient with my supervising physician, Dr. Jennie Gaming. HISTORY OF PRESENT ILLNESS  Neftaly Martinez is a 46 y.o. female who presents to the ED complaining of headache, neck pain, and chest pain. Patient states she is experiencing pain since Labor Day weekend. She does state that she began noticing neck pain with chest wall pain that began while she was playing tennis at [facility] on vacation. She has taken Advil which did not help relieve pain. She denies any association of chest pain with exertion. She denies any shortness of breath or dyspnea on exertion. She denies any radiation of chest pain. She is not experiencing any nausea or diaphoresis with chest pain. She been experiencing neck pain with rotational motions as well as lateral motions of her head. She describes her pain as more of a dull achy pain that is constant. She is also experiencing intermittent headaches that will last for approximately 2 to 3 days and then resolved. She is experienced 2 episodes of these headaches in the past 2 weeks. She denies any body aches, fevers, or chills. She denies any coughing or sneezing. She denies any vision changes. She denies any nausea, vomiting, diarrhea, or abdominal pain. She denies any urinary symptoms. She denies any numbness or tingling of extremities. No other complaints, modifying factors or associated symptoms. Nursing notes reviewed.    Past Medical History:   Diagnosis Date    Anxiety     B12 deficiency     Depression 9/12/2013    Diabetes mellitus type 2, uncontrolled ([facility] [address] 5/9/2012    Edema of hand     Edema of lower extremity     Hepatitis     Hyperglycemia     Hyperlipidemia     Hypertension     Insomnia     Obesity PUD (peptic ulcer disease)     Vertigo     Vitamin D insufficiency      Past Surgical History:   Procedure Laterality Date    BLADDER SURGERY  2000    bladder suspension    BREAST ENHANCEMENT SURGERY Bilateral 03/04/2021    Dr. Victoria Dimas, TOTAL ABDOMINAL (CERVIX REMOVED)  12/2004    cervix absent, ovaries intact    VENTRAL HERNIA REPAIR  10/2007     Family History   Problem Relation Age of Onset    High Cholesterol Mother     Heart Disease Father     High Blood Pressure Father     Diabetes Father     High Cholesterol Father     Thyroid Disease Sister     Cancer Maternal Aunt         breast    Breast Cancer Maternal Aunt 61    Heart Disease Paternal Uncle     Cancer Maternal Grandmother         breast    Breast Cancer Maternal Grandmother 72     Social History     Socioeconomic History    Marital status:      Spouse name: Elliot Montes De Oca    Number of children: 2    Years of education: college    Highest education level: Not on file   Occupational History    Occupation: Homemaker   Tobacco Use    Smoking status: Never    Smokeless tobacco: Never   Substance and Sexual Activity    Alcohol use: Yes     Alcohol/week: 0.0 standard drinks     Comment: SOCIALLY    Drug use: No    Sexual activity: Yes     Partners: Male   Other Topics Concern    Not on file   Social History Narrative    Not on file     Social Determinants of Health     Financial Resource Strain: High Risk    Difficulty of Paying Living Expenses: Very hard   Food Insecurity: No Food Insecurity    Worried About Running Out of Food in the Last Year: Never true    Ran Out of Food in the Last Year: Never true   Transportation Needs: Not on file   Physical Activity: Not on file   Stress: Not on file   Social Connections: Not on file   Intimate Partner Violence: Not on file   Housing Stability: Not on file     No current facility-administered medications for this encounter.      Current Outpatient Medications   Medication Sig Dispense Refill    acetaminophen (TYLENOL) 500 MG tablet Take 1 tablet by mouth 4 times daily as needed for Pain 120 tablet 0    ibuprofen (ADVIL;MOTRIN) 800 MG tablet Take 1 tablet by mouth 3 times daily as needed for Pain 90 tablet 0    lidocaine (LIDODERM) 5 % Place 1 patch onto the skin daily for 10 days 12 hours on, 12 hours off. 10 patch 0    Multiple Vitamin (MULTIVITAMIN PO) Take 1 tablet by mouth      ciprofloxacin (CIPRO) 500 MG tablet Take 1 tablet by mouth 2 times daily for 3 days 6 tablet 0    ALPRAZolam (XANAX) 1 MG tablet TAKE 1-1.5 TABLETS BY MOUTH NIGHTLY AS NEEDED FOR SLEEP FOR UP TO 90 DAYS. 45 tablet 2    cyanocobalamin 1000 MCG/ML injection INJECT 1 ML INTO THE MUSCLE FOR 1 DOSE MONTHLY. 1 mL 11    Dulaglutide (TRULICITY) 3 RD/3.3IM SOPN Inject 3 mg into the skin once a week 6 mL 1    pantoprazole (PROTONIX) 40 MG tablet TAKE 1 TABLET BY MOUTH EVERY DAY BEFORE BREAKFAST 90 tablet 1    hydroCHLOROthiazide (HYDRODIURIL) 25 MG tablet TAKE 1 TABLET BY MOUTH EVERY DAY 90 tablet 1    sertraline (ZOLOFT) 25 MG tablet Take 1 tablet by mouth daily (in addition to a 50 mg pill daily) 90 tablet 3    sertraline (ZOLOFT) 50 MG tablet TAKE 1 TABLET BY MOUTH EVERY DAY 90 tablet 3    metFORMIN (GLUCOPHAGE-XR) 500 MG extended release tablet Take 4 tablets by mouth daily (with breakfast) 360 tablet 1    rosuvastatin (CRESTOR) 5 MG tablet TAKE 1 TABLET BY MOUTH EVERY DAY 30 tablet 5    Vitamins/Minerals TABS Take 1 tablet by mouth daily      blood glucose test strips (ASCENSIA AUTODISC VI;ONE TOUCH ULTRA TEST VI) strip Inject 1 each into the skin 4 times daily As needed. One Touch Ultra test strips. 150 each 11    Insulin Pen Needle (ULTICARE MINI PEN NEEDLES) 31G X 6 MM MISC USE 1 DAILY AS DIRECTED 100 each 5    aspirin 81 MG tablet Take 81 mg by mouth daily      Cholecalciferol (VITAMIN D3) 5000 UNITS CAPS Take 5,000 Int'l Units by mouth daily.          Allergies   Allergen Reactions    Codeine Hives and Nausea And Vomiting    Vicodin [Hydrocodone-Acetaminophen] Nausea And Vomiting       REVIEW OF SYSTEMS  10 systems reviewed, pertinent positives per HPI otherwise noted to be negative    PHYSICAL EXAM  /72   Pulse 71   Temp 98 °F (36.7 °C) (Oral)   Resp 16   Ht 5' 3\" (1.6 m)   Wt 180 lb (81.6 kg)   LMP 01/06/2005   SpO2 97%   BMI 31.89 kg/m²   GENERAL APPEARANCE: Awake and alert. Cooperative. No acute distress. Nontoxic in appearance. HEAD: Normocephalic. Atraumatic. No childers signs or raccoon eyes. EYES: PERRL. EOM's grossly intact. No conjunctival injection or discharge. ENT: Mucous membranes are pink and moist.   NECK: Supple. Full range of motion with some discomfort with rotation of the lateral motions. No edema, erythema, or ecchymosis. No palpable deformities or crepitus. No step-off. No nuchal rigidity. No tracheal tenderness or deviation. No stridor. HEART: RRR. No murmurs, rubs or gallops. Normal S1-S2. No S3 or S4.  LUNGS: Respirations unlabored. CTAB. Good air exchange. Speaking comfortably in full sentences. ABDOMEN: Soft. Non-distended. Non-tender. No guarding or rebound. No masses. No organomegaly. EXTREMITIES: No peripheral edema. Moves all extremities equally. All extremities neurovascularly intact. SKIN: Warm and dry. No acute rashes. NEUROLOGICAL: Alert and oriented. CN's 2-12 intact. No slurred speech. No gross facial drooping. Strength 5/5, sensation intact. PSYCHIATRIC: Normal mood and affect. RADIOLOGY  XR CHEST PORTABLE    Result Date: 9/15/2022  EXAMINATION: ONE XRAY VIEW OF THE CHEST 9/15/2022 8:36 am COMPARISON: 10/30/2018 and 11/30/2014. HISTORY: ORDERING SYSTEM PROVIDED HISTORY: chest pain TECHNOLOGIST PROVIDED HISTORY: Reason for exam:->chest pain Reason for Exam: chest pain FINDINGS: The lungs and costophrenic angles are clear.  The cardiomediastinal silhouette and pulmonary vessels appear normal.     No radiographic evidence of an acute cardiopulmonary process. [facility] [place]ING [place]UG[place]ED BILATERAL    Result Date: 9/7/2022  EXAMINATION: BILATERAL DIGITAL SCREENING MAMMOGRAM, 9/7/2022 TECHNIQUE: CC and MLO views of the left and right breasts were obtained. Computer aided detection was utilized in the interpretation of this exam. COMPARISON: 08/11/2021 through 11/30/2012 HISTORY: Screening. Patient reports family history of breast cancer in maternal grandmother as well as an aunt. Bilateral silicone implants replaced in 2021. FINDINGS: Scattered fibroglandular densities are noted. Bilateral implants appear intact. Right breast CC view implant excluded view demonstrates new calcifications along the nipple line, middle to posterior third without definite visualization on the MLO views. Left breast demonstrates a small group of new calcifications inferiorly on MLO view near the nipple line on CC view. On the CC implant included view laterally adjacent to the implant along the chest wall is a subtle asymmetry. Bilateral diagnostic mammogram is recommended for calcifications. Recommend 90 degree imaging bilaterally full field as well as orthogonal magnified views. Tomosynthesis on the right may be useful to better localize the calcifications (as they are only definitely seen on one view). Also on the left, recommend obtaining spot compression laterally and posteriorly to evaluate for an asymmetry only visualized on the implant included view. Ultrasound may be requested follow-up as well. BIRADS: BIRADS - CATEGORY 0 Incomplete: Needs Additional Imaging Evaluation OVERALL ASSESSMENT - INCOMPLETE:NEED ADDITIONAL IMAGING EVALUATION. [facility] [place] DIGITAL DIAGNOSTIC BILATERAL    Result Date: 9/13/2022  EXAMINATION: DIAGNOSTIC DIGITAL BILATERAL BREASTS MAMMOGRAM WITH TOMOSYNTHESIS, 9/13/2022 7:28 am TECHNIQUE: Diagnostic mammography of the bilateral breasts was performed with tomosynthesis.   2D standard and 3D tomosynthesis combination imaging performed through both breasts. Computer aided detection was utilized in the interpretation of this exam. Views: Bilateral magnification CC, mL, left spot CC COMPARISON: [id number]  HISTORY: ORDERING SYSTEM PROVIDED HISTORY: Abnormal mammogram TECHNOLOGIST PROVIDED HISTORY: Is the patient pregnant?->No The patient at bilateral implant placement in March 2021 FINDINGS: Mammogram: There are scattered areas of fibroglandular density. Right breast: There is a 0.8 cm group of calcifications in the lower inner quadrant right breast at posterior depth which corresponds to the previous mammographic finding. There is a 0.3 cm group of calcifications in the central lower left breast at posterior depth which corresponds to previous mammographic finding. The previously identified asymmetry in the lateral left breast at posterior depth appears less prominent on additional diagnostic mammographic images. Bilateral probably benign calcifications in both breasts and left breast asymmetry. They may represent postsurgical changes related fat necrosis/calcifications. Follow-up bilateral diagnostic mammogram in 6 months is recommended demonstrate stability. The results were communicated with the patient time of the studies. BIRADS: BIRADS - CATEGORY 3 Probably Benign Findings. A short interval follow-up is recommended in 6 months. OVERALL ASSESSMENT - PROBABLY BENIGN. The results were communicated with the patient time of the studies. A letter of notification will be sent to the patient regarding the results. ED COURSE  49-year-old female presents to the ED for evaluation of chest/chest wall pain as well as headaches and slight neck pain. On exam she has a well-appearing female in no acute distress. She does have reproducible sternal and anterior chest wall tenderness consistent with costochondritis or other chest wall pains. Her exam is not consistent with ACS or PE dissection.   There is mild amount leukocyte esterase seen on UA. There is 69 white blood cells as well as 1+ bacteria and 11-20 epithelial cells. This amount of epithelial cells seen on microscopic UA suggestive of contamination versus infection. No abnormalities seen on CBC. She is slight decrease in sodium as well as chloride on CMP. She is hyperglycemic with a glucose of 140. Troponin was negative. Chest x-ray shows no radiographic evidence of acute cardiopulmonary processes. EKG was interpreted by Dr. Anirudh Mitchell Theresa is read as normal sinus rhythm. Patient received Reglan, Toradol, and Benadryl for pain, with good relief. Triage vitals /96, pulse of 80, respirations 16, temperature 98.0 °F, SPO2 99 % on room air. Blood pressure decreased and vital signs remained stable during course of ED stay. Risk management discussed and shared decision making had with patient and/or surrogate. All questions were answered. Patient will follow up with primary care provider for further evaluation/treatment. All questions answered. Patient will return to ED for new/worsening symptoms. Patient was sent home with a prescription for Tylenol, Motrin, and Lidoderm patches. CRITICAL CARE TIME  0 minutes of critical care time spent not including separately billable procedures.     MDM  Results for orders placed or performed during the hospital encounter of 09/15/22   CBC with Auto Differential   Result Value Ref Range    WBC 9.5 4.0 - 11.0 K/uL    RBC 4.96 4.00 - 5.20 M/uL    Hemoglobin 14.7 12.0 - 16.0 g/dL    Hematocrit 44.1 36.0 - 48.0 %    MCV 88.8 80.0 - 100.0 fL    MCH 29.5 26.0 - 34.0 pg    MCHC 33.3 31.0 - 36.0 g/dL    RDW 13.0 12.4 - 15.4 %    Platelets 695 625 - 197 K/uL    MPV 7.9 5.0 - 10.5 fL    Neutrophils % 65.4 %    Lymphocytes % 25.4 %    Monocytes % 7.1 %    Eosinophils % 1.3 %    Basophils % 0.8 %    Neutrophils Absolute 6.2 1.7 - 7.7 K/uL    Lymphocytes Absolute 2.4 1.0 - 5.1 K/uL    Monocytes Absolute 0.7 0.0 - 1.3 K/uL    Eosinophils Absolute 0.1 0.0 - 0.6 K/uL    Basophils Absolute 0.1 0.0 - 0.2 K/uL   Comprehensive Metabolic Panel   Result Value Ref Range    Sodium 133 (L) 136 - 145 mmol/L    Potassium 4.0 3.5 - 5.1 mmol/L    Chloride 91 (L) 99 - 110 mmol/L    CO2 28 21 - 32 mmol/L    Anion Gap 14 3 - 16    Glucose 140 (H) 70 - 99 mg/dL    BUN 14 7 - 20 mg/dL    Creatinine 0.7 0.6 - 1.1 mg/dL    GFR Non-African American >60 >60    GFR African American >60 >60    Calcium 10.0 8.3 - 10.6 mg/dL    Total Protein 8.1 6.4 - 8.2 g/dL    Albumin 5.2 (H) 3.4 - 5.0 g/dL    Albumin/Globulin Ratio 1.8 1.1 - 2.2    Total Bilirubin 0.3 0.0 - 1.0 mg/dL    Alkaline Phosphatase 102 40 - 129 U/L    ALT 15 10 - 40 U/L    AST 19 15 - 37 U/L   Troponin   Result Value Ref Range    Troponin <0.01 <0.01 ng/mL   Urinalysis with Reflex to Culture    Specimen: Urine, clean catch   Result Value Ref Range    Color, UA Yellow Straw/Yellow    Clarity, UA Clear Clear    Glucose, Ur Negative Negative mg/dL    Bilirubin Urine Negative Negative    Ketones, Urine Negative Negative mg/dL    Specific Gravity, UA <=1.005 1.005 - 1.030    Blood, Urine Negative Negative    pH, UA 6.0 5.0 - 8.0    Protein, UA Negative Negative mg/dL    Urobilinogen, Urine 0.2 <2.0 E.U./dL    Nitrite, Urine Negative Negative    Leukocyte Esterase, Urine MODERATE (A) Negative    Microscopic Examination YES     Urine Type NotGiven     Urine Reflex to Culture Not Indicated    Microscopic Urinalysis   Result Value Ref Range    WBC, UA 6-9 (A) 0 - 5 /HPF    RBC, UA None seen 0 - 4 /HPF    Epithelial Cells, UA 11-20 (A) 0 - 5 /HPF    Bacteria, UA 1+ (A) None Seen /HPF   EKG 12 Lead   Result Value Ref Range    Ventricular Rate 77 BPM    Atrial Rate 77 BPM    P-R Interval 178 ms    QRS Duration 72 ms    Q-T Interval 376 ms    QTc Calculation (Bazett) 425 ms    P Axis 54 degrees    R Axis 28 degrees    T Axis 48 degrees    Diagnosis       Normal sinus rhythmNormal ECGWhen compared with ECG of 30-OCT-2018 23: 05,No significant change was foundConfirmed by Caro Nicholas ([id number]) on 9/15/2022 1:55:12 PM       I estimate there is LOW risk for PULMONARY EMBOLISM, ACUTE CORONARY SYNDROME, OR THORACIC AORTIC DISSECTION, thus I consider the discharge disposition reasonable. Ortega Menezes and I have discussed the diagnosis and risks, and we agree with discharging home to follow-up with their primary doctor. We also discussed returning to the Emergency Department immediately if new or worsening symptoms occur. We have discussed the symptoms which are most concerning (e.g., bloody sputum, fever, worsening pain or shortness of breath, vomiting) that necessitate immediate return. FINAL Impression    1. Chest wall pain    2. Trapezius muscle strain, left, initial encounter    3. Nonintractable headache, unspecified chronicity pattern, unspecified headache type    4. Costochondritis        Blood pressure 118/72, pulse 71, temperature 98 °F (36.7 °C), temperature source Oral, resp. rate 16, height 5' 3\" (1.6 m), weight 180 lb (81.6 kg), last menstrual period 01/06/2005, SpO2 97 %, not currently breastfeeding. DISPOSITION  Patient was discharged to home in good condition.          Charline Jhaveri PA-C  09/19/22 [id number]

## 2022-09-20 DIAGNOSIS — R60.9 EDEMA, UNSPECIFIED TYPE: ICD-10-CM

## 2022-09-20 DIAGNOSIS — I10 ESSENTIAL HYPERTENSION: ICD-10-CM

## 2022-09-20 LAB
CREATININE URINE: 44 MG/DL (ref 28–259)
ESTIMATED AVERAGE GLUCOSE: 148.5 MG/DL
HBA1C MFR BLD: 6.8 %
MICROALBUMIN UR-MCNC: <1.2 MG/DL
MICROALBUMIN/CREAT UR-RTO: NORMAL MG/G (ref 0–30)

## 2022-09-21 RX ORDER — HYDROCHLOROTHIAZIDE 25 MG/1
TABLET ORAL
Qty: 90 TABLET | Refills: 1 | Status: SHIPPED | OUTPATIENT
Start: 2022-09-21 | End: 2022-11-02 | Stop reason: SDUPTHER

## 2022-09-21 NOTE — TELEPHONE ENCOUNTER
Refill request for hctz  medication.      Name of Pharmacy- [facility]       Last visit - 9-     Pending visit - 3-    Last refill -6-9-2022      Medication Contract signed -   Liborio contreras-         Additional Comments

## 2022-09-27 ENCOUNTER — PATIENT MESSAGE (OUTPATIENT)
Dept: INTERNAL MEDICINE CLINIC | Age: 52
End: 2022-09-27

## 2022-09-27 DIAGNOSIS — E78.2 MIXED HYPERLIPIDEMIA: ICD-10-CM

## 2022-09-27 DIAGNOSIS — E78.2 MIXED HYPERLIPIDEMIA: Primary | ICD-10-CM

## 2022-09-27 RX ORDER — ROSUVASTATIN CALCIUM 5 MG/1
5 TABLET, COATED ORAL DAILY
Qty: 30 TABLET | Refills: 1 | Status: SHIPPED | OUTPATIENT
Start: 2022-09-27 | End: 2022-11-02 | Stop reason: SDUPTHER

## 2022-09-27 RX ORDER — CYANOCOBALAMIN 1000 UG/ML
INJECTION INTRAMUSCULAR; SUBCUTANEOUS
Qty: 1 ML | Refills: 11 | Status: SHIPPED | OUTPATIENT
Start: 2022-09-27 | End: 2022-10-08 | Stop reason: SDUPTHER

## 2022-09-27 NOTE — TELEPHONE ENCOUNTER
Refill request for Crestor medication.      Name of Pharmacy- [facility]      Last visit - 9-19-22     Pending visit - 3-21-23    Last refill -9-21-21      Medication Contract signed -   Liborio contreras-         Additional Comments

## 2022-09-27 NOTE — TELEPHONE ENCOUNTER
Refill request for Cyanocobalamin medication.      Name of Pharmacy- [facility]      Last visit - 9-19-22     Pending visit - 3-21-23    Last refill -7-18-22      Medication Contract signed -   Last Mary Ann contreras-         Additional Comments

## 2022-09-27 NOTE — TELEPHONE ENCOUNTER
From: Corinna Lawson  To: Dr. Chaparro Sis: 9/27/2022 1:53 PM EDT  Subject: Sandie Burgos I keep missing your calls, I think I ran out of the crestor, can you call that back into the pharmacy for me? I promise I will take it regularly.  Thank you, Ragini Jose

## 2022-10-06 RX ORDER — METFORMIN HYDROCHLORIDE 500 MG/1
2000 TABLET, EXTENDED RELEASE ORAL
Qty: 360 TABLET | Refills: 1 | OUTPATIENT
Start: 2022-10-06

## 2022-10-06 NOTE — TELEPHONE ENCOUNTER
Medication:   Requested Prescriptions     Pending Prescriptions Disp Refills    metFORMIN (GLUCOPHAGE-XR) 500 MG extended release tablet [Pharmacy Med Name: METFORMIN HCL  MG TABLET] 360 tablet 1     Sig: TAKE 4 TABLETS BY MOUTH DAILY (WITH BREAKFAST).        Last Filled:      Patient Phone Number: [phone] (home) [phone] (work)    Last appt: 11/10/2021   Next appt: Visit date not found    Last Labs DM:   Lab Results   Component Value Date/Time    LABA1C 6.8 09/19/2022 10:03 AM

## 2022-10-10 ENCOUNTER — PATIENT MESSAGE (OUTPATIENT)
Dept: INTERNAL MEDICINE CLINIC | Age: 52
End: 2022-10-10

## 2022-10-10 RX ORDER — DULAGLUTIDE 3 MG/.5ML
3 INJECTION, SOLUTION SUBCUTANEOUS WEEKLY
Qty: 6 ML | Refills: 1 | Status: SHIPPED | OUTPATIENT
Start: 2022-10-10

## 2022-10-10 RX ORDER — CYANOCOBALAMIN 1000 UG/ML
INJECTION INTRAMUSCULAR; SUBCUTANEOUS
Qty: 1 ML | Refills: 11 | Status: SHIPPED | OUTPATIENT
Start: 2022-10-10

## 2022-10-10 NOTE — TELEPHONE ENCOUNTER
Refill request for dulaglutide  medication.      Name of Pharmacy- [facility]      Last visit - 9-     Pending visit - 3-    Last refill -7-      Medication Contract signed -PDMP Monitoring:    Last PDMP Beatris Charles as Reviewed:  Review User Review Instant Review Result   DOROTHEA SERRANO 8/9/2022 10:47 AM Reviewed PDMP [1]     @[facility] Center[facility]@  Urine Drug Screenings (1 yr)       Drug screen multi urine  Collected: 11/30/2014  6:15 PM (Final result)                  Medication Contract and Consent for Opioid Use Documents Filed       Patient Documents       Type of Document Status Date Received Received By Description    Medication Contract [Status Missing]  ASHUTOSH KAUR Medication Agreement 6/18/15                    Last Oarrs ran-   Additional Comments

## 2022-10-10 NOTE — TELEPHONE ENCOUNTER
Refill request for Cyanocobalamin medication.      Name of Pharmacy- [facility]      Last visit - 9-19-22     Pending visit - 3-21-23    Last refill -9-27-22      Medication Contract signed -   Last Oarrs ran-         Additional Comments PDMP Monitoring:    Last PDMP Chantelle Burton as Reviewed:  Review User Review Instant Review Result   DOROTHEA SERRANO 8/9/2022 10:47 AM Reviewed PDMP [1]     [unfilled]  Urine Drug Screenings (1 yr)       Drug screen multi urine  Collected: 11/30/2014  6:15 PM (Final result)                  Medication Contract and Consent for Opioid Use Documents Filed       Patient Documents       Type of Document Status Date Received Received By Description    Medication Contract [Status Missing]  ASHUTOSH KAUR Medication Agreement 6/18/15

## 2022-10-11 RX ORDER — METFORMIN HYDROCHLORIDE 500 MG/1
2000 TABLET, EXTENDED RELEASE ORAL
Qty: 360 TABLET | Refills: 1 | Status: SHIPPED | OUTPATIENT
Start: 2022-10-11

## 2022-10-11 NOTE — TELEPHONE ENCOUNTER
From: Lexx Cortes  To: Dr. Paz Edward: 10/10/2022 6:32 PM EDT  Subject: Metformin    Dr Cecilio Winston my metformin needs refilled, it was through dr Koki Valerio. I am out as of today. I tried through my chart but it wont allow it.  Thanks, Mary Perkins

## 2022-10-11 NOTE — TELEPHONE ENCOUNTER
Refill request for metformin  medication.      Name of Pharmacy- [facility]      Last visit - 9-     Pending visit - 3-    Last refill -11-      Medication Contract signed -   Liborio contreras-         Additional Comments

## 2022-11-02 DIAGNOSIS — I10 ESSENTIAL HYPERTENSION: ICD-10-CM

## 2022-11-02 DIAGNOSIS — R60.9 EDEMA, UNSPECIFIED TYPE: ICD-10-CM

## 2022-11-02 DIAGNOSIS — E78.2 MIXED HYPERLIPIDEMIA: ICD-10-CM

## 2022-11-02 RX ORDER — HYDROCHLOROTHIAZIDE 25 MG/1
TABLET ORAL
Qty: 90 TABLET | Refills: 1 | Status: SHIPPED | OUTPATIENT
Start: 2022-11-02

## 2022-11-02 RX ORDER — ROSUVASTATIN CALCIUM 5 MG/1
5 TABLET, COATED ORAL DAILY
Qty: 30 TABLET | Refills: 1 | Status: SHIPPED | OUTPATIENT
Start: 2022-11-02

## 2022-11-02 NOTE — TELEPHONE ENCOUNTER
Refill request for HCTZ, CRESTOR medication.      Name of Pharmacy- [facility]      Last visit - 9/19/22     Pending visit - 3/21/23    Last refill -9/21/22,  9/27/22      Medication Contract signed -   Last Oarrs ran-         Additional Comments

## 2022-12-02 NOTE — TELEPHONE ENCOUNTER
Refill request for SERTRALINE 50MG, 25MG medication.      Name of Pharmacy- [facility]      Last visit - 9/19/22     Pending visit - 3/21/23    Last refill -11/3/22      Medication Contract signed -   Last Oarrs ran-         Additional Comments

## 2022-12-06 RX ORDER — SERTRALINE HYDROCHLORIDE 25 MG/1
25 TABLET, FILM COATED ORAL DAILY
Qty: 90 TABLET | Refills: 3 | Status: SHIPPED | OUTPATIENT
Start: 2022-12-06

## 2022-12-12 ENCOUNTER — PATIENT MESSAGE (OUTPATIENT)
Dept: INTERNAL MEDICINE CLINIC | Age: 52
End: 2022-12-12

## 2022-12-12 DIAGNOSIS — F41.1 GAD (GENERALIZED ANXIETY DISORDER): ICD-10-CM

## 2022-12-13 RX ORDER — ALPRAZOLAM 1 MG/1
1-1.5 TABLET ORAL NIGHTLY PRN
Qty: 45 TABLET | Refills: 2 | Status: SHIPPED | OUTPATIENT
Start: 2022-12-13 | End: 2023-03-13

## 2022-12-13 NOTE — TELEPHONE ENCOUNTER
Rx sent. Controlled Substance Monitoring:    Acute and Chronic Pain Monitoring:   RX Monitoring 12/13/2022   Attestation -   Periodic Controlled Substance Monitoring No signs of potential drug abuse or diversion identified.

## 2022-12-13 NOTE — TELEPHONE ENCOUNTER
From: Heriberto Brownlee  To: Dr. Escobar Post: 12/12/2022 7:52 PM EST  Subject: Xanax    I need to refill my prescription of Xanax but dont see it on my list. Can you refill this?  Thank you, Ulysses Greenhouse

## 2022-12-13 NOTE — TELEPHONE ENCOUNTER
Refill request for alprazolam  medication.      Name of Pharmacy- [facility]      Last visit - 9-     Pending visit - 3-    Last refill -8-9-2022      Medication Contract signed -PDMP Monitoring:    Last PDMP Padmini Outlaw as Reviewed:  Review User Review Instant Review Result   DOROTHEA SERRANO 8/9/2022 10:47 AM Reviewed PDMP [1]     @[facility]@  Urine Drug Screenings (1 yr)       Drug screen multi urine  Collected: 11/30/2014  6:15 PM (Final result)                  Medication Contract and Consent for Opioid Use Documents Filed       Patient Documents       Type of Document Status Date Received Received By Description    Medication Contract [Status Missing]  ASHUTOSH KAUR Medication Agreement 6/18/15                     Last Oarrs ran-         Additional Comments

## 2022-12-30 DIAGNOSIS — E78.2 MIXED HYPERLIPIDEMIA: ICD-10-CM

## 2022-12-30 RX ORDER — ROSUVASTATIN CALCIUM 5 MG/1
TABLET, COATED ORAL
Qty: 30 TABLET | Refills: 1 | Status: SHIPPED | OUTPATIENT
Start: 2022-12-30

## 2022-12-30 NOTE — TELEPHONE ENCOUNTER
Refill request for CRESTOR medication.      Name of Pharmacy- [facility]      Last visit - 9/19/22     Pending visit - 3/21/22    Last refill -12/5/22      Medication Contract signed -   Last Oarrs ran-         Additional Comments

## 2023-01-12 RX ORDER — PANTOPRAZOLE SODIUM 40 MG/1
TABLET, DELAYED RELEASE ORAL
Qty: 90 TABLET | Refills: 1 | Status: SHIPPED | OUTPATIENT
Start: 2023-01-12

## 2023-01-12 NOTE — TELEPHONE ENCOUNTER
Refill request for PROTONIX medication.      Name of Pharmacy- [facility]      Last visit - 9/19/22     Pending visit - 3/21/23    Last refill -6/23/22      Medication Contract signed -   Last Oarrs ran-         Additional Comments

## 2023-01-16 RX ORDER — METFORMIN HYDROCHLORIDE 500 MG/1
2000 TABLET, EXTENDED RELEASE ORAL
Qty: 360 TABLET | Refills: 1 | Status: SHIPPED | OUTPATIENT
Start: 2023-01-16

## 2023-01-16 NOTE — TELEPHONE ENCOUNTER
Refill request for Metformin medication.      Name of Pharmacy- [facility]        Last visit - 9/19/22     Pending visit - 3/21/23    Last refill -10/11/22      Medication Contract signed -   Last Oarrs ran-         Additional Comments

## 2023-03-21 ENCOUNTER — OFFICE VISIT (OUTPATIENT)
Dept: INTERNAL MEDICINE CLINIC | Age: 53
End: 2023-03-21
Payer: COMMERCIAL

## 2023-03-21 ENCOUNTER — HOSPITAL ENCOUNTER (OUTPATIENT)
Age: 53
Discharge: HOME | End: 2023-03-21
Payer: COMMERCIAL

## 2023-03-21 DIAGNOSIS — H66.002 NON-RECURRENT ACUTE SUPPURATIVE OTITIS MEDIA OF LEFT EAR WITHOUT SPONTANEOUS RUPTURE OF TYMPANIC MEMBRANE: ICD-10-CM

## 2023-03-21 DIAGNOSIS — I10 ESSENTIAL HYPERTENSION: ICD-10-CM

## 2023-03-21 DIAGNOSIS — Z00.00 WELL ADULT EXAM: ICD-10-CM

## 2023-03-21 DIAGNOSIS — E06.3 HASHIMOTO'S THYROIDITIS: ICD-10-CM

## 2023-03-21 DIAGNOSIS — F32.A DEPRESSION, UNSPECIFIED DEPRESSION TYPE: ICD-10-CM

## 2023-03-21 DIAGNOSIS — E78.2 MIXED HYPERLIPIDEMIA: ICD-10-CM

## 2023-03-21 DIAGNOSIS — E66.01 CLASS 2 SEVERE OBESITY WITH SERIOUS COMORBIDITY AND BODY MASS INDEX (BMI) OF 36.0 TO 36.9 IN ADULT, UNSPECIFIED OBESITY TYPE (HCC): ICD-10-CM

## 2023-03-21 DIAGNOSIS — F42.9 OBSESSIVE-COMPULSIVE DISORDER, UNSPECIFIED TYPE: ICD-10-CM

## 2023-03-21 DIAGNOSIS — Z00.00 ENCOUNTER FOR WELL ADULT EXAM WITHOUT ABNORMAL FINDINGS: ICD-10-CM

## 2023-03-21 DIAGNOSIS — E11.9 TYPE 2 DIABETES MELLITUS WITHOUT COMPLICATION, WITHOUT LONG-TERM CURRENT USE OF INSULIN (HCC): ICD-10-CM

## 2023-03-21 DIAGNOSIS — F41.1 GAD (GENERALIZED ANXIETY DISORDER): ICD-10-CM

## 2023-03-21 DIAGNOSIS — G47.00 INSOMNIA, UNSPECIFIED TYPE: ICD-10-CM

## 2023-03-21 DIAGNOSIS — K75.4 AUTOIMMUNE HEPATITIS (HCC): ICD-10-CM

## 2023-03-21 DIAGNOSIS — Z00.00 WELL ADULT EXAM: Primary | ICD-10-CM

## 2023-03-21 PROBLEM — E04.9 GOITER: Status: RESOLVED | Noted: 2019-10-02 | Resolved: 2023-03-21

## 2023-03-21 PROBLEM — N95.1 SYMPTOMATIC MENOPAUSAL OR FEMALE CLIMACTERIC STATES: Status: RESOLVED | Noted: 2022-07-28 | Resolved: 2023-03-21

## 2023-03-21 PROBLEM — Z90.710 H/O TOTAL HYSTERECTOMY: Status: RESOLVED | Noted: 2018-02-05 | Resolved: 2023-03-21

## 2023-03-21 LAB
ALBUMIN SERPL-MCNC: 4.4 G/DL (ref 3.4–5)
ALBUMIN/GLOB SERPL: 1.4 {RATIO} (ref 1.1–2.2)
ALP SERPL-CCNC: 95 U/L (ref 40–129)
ALT SERPL-CCNC: 15 U/L (ref 10–40)
ANION GAP SERPL CALCULATED.3IONS-SCNC: 16 MMOL/L (ref 3–16)
AST SERPL-CCNC: 22 U/L (ref 15–37)
BASOPHILS # BLD: 0 K/UL (ref 0–0.2)
BASOPHILS NFR BLD: 0.3 %
BILIRUB SERPL-MCNC: 0.3 MG/DL (ref 0–1)
BUN SERPL-MCNC: 11 MG/DL (ref 7–20)
CALCIUM SERPL-MCNC: 9.9 MG/DL (ref 8.3–10.6)
CHLORIDE SERPL-SCNC: 100 MMOL/L (ref 99–110)
CHOLEST SERPL-MCNC: 308 MG/DL (ref 0–199)
CO2 SERPL-SCNC: 25 MMOL/L (ref 21–32)
CREAT SERPL-MCNC: 0.6 MG/DL (ref 0.6–1.1)
DEPRECATED RDW RBC AUTO: 13.1 % (ref 12.4–15.4)
EOSINOPHIL # BLD: 0.1 K/UL (ref 0–0.6)
EOSINOPHIL NFR BLD: 1 %
FSH SERPL-ACNC: 34 MIU/ML
GFR SERPLBLD CREATININE-BSD FMLA CKD-EPI: >60 ML/MIN/{1.73_M2}
GLUCOSE SERPL-MCNC: 132 MG/DL (ref 70–99)
HCT VFR BLD AUTO: 41.4 % (ref 36–48)
HDLC SERPL-MCNC: 76 MG/DL (ref 40–60)
HGB BLD-MCNC: 14 G/DL (ref 12–16)
LDLC SERPL CALC-MCNC: 185 MG/DL
LYMPHOCYTES # BLD: 2.1 K/UL (ref 1–5.1)
LYMPHOCYTES NFR BLD: 23.4 %
MCH RBC QN AUTO: 29.6 PG (ref 26–34)
MCHC RBC AUTO-ENTMCNC: 33.9 G/DL (ref 31–36)
MCV RBC AUTO: 87.3 FL (ref 80–100)
MONOCYTES # BLD: 0.6 K/UL (ref 0–1.3)
MONOCYTES NFR BLD: 6.5 %
NEUTROPHILS # BLD: 6.2 K/UL (ref 1.7–7.7)
NEUTROPHILS NFR BLD: 68.8 %
PLATELET # BLD AUTO: 377 K/UL (ref 135–450)
PMV BLD AUTO: 8 FL (ref 5–10.5)
POTASSIUM SERPL-SCNC: 4.3 MMOL/L (ref 3.5–5.1)
PROT SERPL-MCNC: 7.6 G/DL (ref 6.4–8.2)
RBC # BLD AUTO: 4.74 M/UL (ref 4–5.2)
SODIUM SERPL-SCNC: 141 MMOL/L (ref 136–145)
TRIGL SERPL-MCNC: 234 MG/DL (ref 0–150)
TSH SERPL DL<=0.005 MIU/L-ACNC: 2.09 UIU/ML (ref 0.27–4.2)
VLDLC SERPL CALC-MCNC: 47 MG/DL
WBC # BLD AUTO: 9 K/UL (ref 4–11)

## 2023-03-21 PROCEDURE — 84270 ASSAY OF SEX HORMONE GLOBUL: CPT

## 2023-03-21 PROCEDURE — 82671 ASSAY OF ESTROGENS: CPT

## 2023-03-21 PROCEDURE — 84443 ASSAY THYROID STIM HORMONE: CPT

## 2023-03-21 PROCEDURE — 85025 COMPLETE CBC W/AUTO DIFF WBC: CPT

## 2023-03-21 PROCEDURE — 80053 COMPREHEN METABOLIC PANEL: CPT

## 2023-03-21 PROCEDURE — 84403 ASSAY OF TOTAL TESTOSTERONE: CPT

## 2023-03-21 PROCEDURE — 83036 HEMOGLOBIN GLYCOSYLATED A1C: CPT

## 2023-03-21 PROCEDURE — 83001 ASSAY OF GONADOTROPIN (FSH): CPT

## 2023-03-21 PROCEDURE — 80061 LIPID PANEL: CPT

## 2023-03-21 PROCEDURE — 99396 PREV VISIT EST AGE 40-64: CPT | Performed by: NURSE PRACTITIONER

## 2023-03-21 PROCEDURE — 36415 COLL VENOUS BLD VENIPUNCTURE: CPT

## 2023-03-21 RX ORDER — ROSUVASTATIN CALCIUM 5 MG/1
5 TABLET, COATED ORAL DAILY
Qty: 90 TABLET | Refills: 3 | Status: SHIPPED | OUTPATIENT
Start: 2023-03-21 | End: 2023-03-21

## 2023-03-21 RX ORDER — CEFDINIR 300 MG/1
300 CAPSULE ORAL 2 TIMES DAILY
Qty: 20 CAPSULE | Refills: 0 | Status: SHIPPED | OUTPATIENT
Start: 2023-03-21 | End: 2023-03-31

## 2023-03-21 RX ORDER — ROSUVASTATIN CALCIUM 20 MG/1
20 TABLET, COATED ORAL DAILY
Qty: 90 TABLET | Refills: 1 | Status: SHIPPED | OUTPATIENT
Start: 2023-03-21

## 2023-03-21 SDOH — ECONOMIC STABILITY: INCOME INSECURITY: HOW HARD IS IT FOR YOU TO PAY FOR THE VERY BASICS LIKE FOOD, HOUSING, MEDICAL CARE, AND HEATING?: NOT HARD AT ALL

## 2023-03-21 SDOH — ECONOMIC STABILITY: FOOD INSECURITY: WITHIN THE PAST 12 MONTHS, THE FOOD YOU BOUGHT JUST DIDN'T LAST AND YOU DIDN'T HAVE MONEY TO GET MORE.: NEVER TRUE

## 2023-03-21 SDOH — ECONOMIC STABILITY: FOOD INSECURITY: WITHIN THE PAST 12 MONTHS, YOU WORRIED THAT YOUR FOOD WOULD RUN OUT BEFORE YOU GOT MONEY TO BUY MORE.: NEVER TRUE

## 2023-03-21 SDOH — ECONOMIC STABILITY: HOUSING INSECURITY
IN THE LAST 12 MONTHS, WAS THERE A TIME WHEN YOU DID NOT HAVE A STEADY PLACE TO SLEEP OR SLEPT IN A SHELTER (INCLUDING NOW)?: NO

## 2023-03-21 ASSESSMENT — PATIENT HEALTH QUESTIONNAIRE - PHQ9
10. IF YOU CHECKED OFF ANY PROBLEMS, HOW DIFFICULT HAVE THESE PROBLEMS MADE IT FOR YOU TO DO YOUR WORK, TAKE CARE OF THINGS AT HOME, OR GET ALONG WITH OTHER PEOPLE: 0
4. FEELING TIRED OR HAVING LITTLE ENERGY: 0
SUM OF ALL RESPONSES TO PHQ QUESTIONS 1-9: 0
1. LITTLE INTEREST OR PLEASURE IN DOING THINGS: 0
SUM OF ALL RESPONSES TO PHQ QUESTIONS 1-9: 0
SUM OF ALL RESPONSES TO PHQ QUESTIONS 1-9: 0
6. FEELING BAD ABOUT YOURSELF - OR THAT YOU ARE A FAILURE OR HAVE LET YOURSELF OR YOUR FAMILY DOWN: 0
7. TROUBLE CONCENTRATING ON THINGS, SUCH AS READING THE NEWSPAPER OR WATCHING TELEVISION: 0
8. MOVING OR SPEAKING SO SLOWLY THAT OTHER PEOPLE COULD HAVE NOTICED. OR THE OPPOSITE, BEING SO FIGETY OR RESTLESS THAT YOU HAVE BEEN MOVING AROUND A LOT MORE THAN USUAL: 0
SUM OF ALL RESPONSES TO PHQ QUESTIONS 1-9: 0
5. POOR APPETITE OR OVEREATING: 0
2. FEELING DOWN, DEPRESSED OR HOPELESS: 0
9. THOUGHTS THAT YOU WOULD BE BETTER OFF DEAD, OR OF HURTING YOURSELF: 0
3. TROUBLE FALLING OR STAYING ASLEEP: 0
SUM OF ALL RESPONSES TO PHQ9 QUESTIONS 1 & 2: 0

## 2023-03-21 ASSESSMENT — ANXIETY QUESTIONNAIRES
GAD7 TOTAL SCORE: 0
2. NOT BEING ABLE TO STOP OR CONTROL WORRYING: 0
4. TROUBLE RELAXING: 0
IF YOU CHECKED OFF ANY PROBLEMS ON THIS QUESTIONNAIRE, HOW DIFFICULT HAVE THESE PROBLEMS MADE IT FOR YOU TO DO YOUR WORK, TAKE CARE OF THINGS AT HOME, OR GET ALONG WITH OTHER PEOPLE: NOT DIFFICULT AT ALL
3. WORRYING TOO MUCH ABOUT DIFFERENT THINGS: 0
5. BEING SO RESTLESS THAT IT IS HARD TO SIT STILL: 0
1. FEELING NERVOUS, ANXIOUS, OR ON EDGE: 0
6. BECOMING EASILY ANNOYED OR IRRITABLE: 0
7. FEELING AFRAID AS IF SOMETHING AWFUL MIGHT HAPPEN: 0

## 2023-03-21 ASSESSMENT — ENCOUNTER SYMPTOMS
DIARRHEA: 0
VOMITING: 0
CHEST TIGHTNESS: 0
SINUS PRESSURE: 1
NAUSEA: 0
SHORTNESS OF BREATH: 0
CONSTIPATION: 0
SINUS PAIN: 1
ABDOMINAL DISTENTION: 0

## 2023-03-21 NOTE — PATIENT INSTRUCTIONS
care is a key part of your treatment and safety. Be sure to make and go to all appointments, and call your doctor if you are having problems. It's also a good idea to know your test results and keep a list of the medicines you take. How can you care for yourself at home? Following the DASH diet  Eat 4 to 5 servings of fruit each day. A serving is 1 medium-sized piece of fruit, ½ cup chopped or canned fruit, 1/4 cup dried fruit, or 4 ounces (½ cup) of fruit juice. Choose fruit more often than fruit juice. Eat 4 to 5 servings of vegetables each day. A serving is 1 cup of lettuce or raw leafy vegetables, ½ cup of chopped or cooked vegetables, or 4 ounces (½ cup) of vegetable juice. Choose vegetables more often than vegetable juice. Get 2 to 3 servings of low-fat and fat-free dairy each day. A serving is 8 ounces of milk, 1 cup of yogurt, or 1 ½ ounces of cheese. Eat 6 to 8 servings of grains each day. A serving is 1 slice of bread, 1 ounce of dry cereal, or ½ cup of cooked rice, pasta, or cooked cereal. Try to choose whole-grain products as much as possible. Limit lean meat, poultry, and fish to 2 servings each day. A serving is 3 ounces, about the size of a deck of cards. Eat 4 to 5 servings of nuts, seeds, and legumes (cooked dried beans, lentils, and split peas) each week. A serving is 1/3 cup of nuts, 2 tablespoons of seeds, or ½ cup of cooked beans or peas. Limit fats and oils to 2 to 3 servings each day. A serving is 1 teaspoon of vegetable oil or 2 tablespoons of salad dressing. Limit sweets and added sugars to 5 servings or less a week. A serving is 1 tablespoon jelly or jam, ½ cup sorbet, or 1 cup of lemonade. Eat less than 2,300 milligrams (mg) of sodium a day. If you limit your sodium to 1,500 mg a day, you can lower your blood pressure even more. Be aware that all of these are the suggested number of servings for people who eat 1,800 to 2,000 calories a day.  Your recommended number of servings

## 2023-03-21 NOTE — PROGRESS NOTES
ear normal.      Left Ear: Hearing and external ear normal. Tympanic membrane is erythematous. Nose: Nose normal.   Eyes:      General: Lids are normal. Lids are everted, no foreign bodies appreciated. Conjunctiva/sclera: Conjunctivae normal.      Pupils: Pupils are equal, round, and reactive to light. Neck:      Thyroid: No thyroid mass. Vascular: Normal carotid pulses. No carotid bruit or JVD. Cardiovascular:      Rate and Rhythm: Normal rate and regular rhythm. No extrasystoles are present. Chest Wall: PMI is not displaced. Pulses:           Radial pulses are 2+ on the right side and 2+ on the left side. Dorsalis pedis pulses are 2+ on the right side and 2+ on the left side. Heart sounds: Normal heart sounds, S1 normal and S2 normal. Heart sounds not distant. No murmur heard. No friction rub. No gallop. No S3 or S4 sounds. Pulmonary:      Effort: Pulmonary effort is normal. No respiratory distress. Breath sounds: Normal breath sounds. No decreased breath sounds, wheezing, rhonchi or rales. Abdominal:      General: Bowel sounds are normal. There is no distension. Palpations: Abdomen is soft. Tenderness: There is no abdominal tenderness. There is no rebound. Musculoskeletal:         General: Normal range of motion. Cervical back: Full passive range of motion without pain, normal range of motion and neck supple. Skin:     General: Skin is warm and dry. Neurological:      Cranial Nerves: No cranial nerve deficit. Psychiatric:         Speech: Speech normal.         Behavior: Behavior normal.         Thought Content: Thought content normal.         Judgment: Judgment normal.       Assessment & Plan: The following diagnoses and conditions are stable with no further orders unless indicated:    1. Well adult exam    2. Hyperlipidemia, Mixed    3.  Non-recurrent acute suppurative otitis media of left ear without spontaneous rupture of tympanic

## 2023-03-21 NOTE — PROGRESS NOTES
Reduce weight and determine a healthy BMI goal  Monitor blood pressure and treat if higher than 140/90 mmHg  Maintain blood total cholesterol levels under 5 mmol/l or 190 mg/dl  Maintain LDL cholesterol levels under 3.0 mmol/l or 115 mg/dl   Control blood glucose levels  Consider taking aspirin (75 mg daily), once blood pressure is controlled   Provided a follow up plan.   Time spent (minutes): ***

## 2023-03-22 DIAGNOSIS — E11.65 TYPE 2 DIABETES MELLITUS WITH HYPERGLYCEMIA, WITHOUT LONG-TERM CURRENT USE OF INSULIN (HCC): Primary | ICD-10-CM

## 2023-03-22 LAB
EST. AVERAGE GLUCOSE BLD GHB EST-MCNC: 188.6 MG/DL
HBA1C MFR BLD: 8.2 %

## 2023-03-23 ENCOUNTER — TELEPHONE (OUTPATIENT)
Dept: ADMINISTRATIVE | Age: 53
End: 2023-03-23

## 2023-03-23 LAB
ESTRADIOL SERPL HS-MCNC: 6.8 PG/ML
ESTROGEN SERPL CALC-MCNC: 39.5 PG/ML
ESTRONE SERPL-MCNC: 32.7 PG/ML
SHBG SERPL-SCNC: 35 NMOL/L (ref 30–135)
TESTOST FREE SERPL-MCNC: 15.7 PG/ML (ref 0.6–3.8)
TESTOST SERPL-MCNC: 89 NG/DL (ref 20–70)

## 2023-03-23 NOTE — TELEPHONE ENCOUNTER
Submitted PA for Trulicity 7.5OB/6.1ZV pen-injectors, Key: [id number]. Per plan - Your PA request has been closed. no pa required, test claim pays - JS, Rep I 03/23/2023      Please notify patient. Thank you.

## 2023-04-04 NOTE — TELEPHONE ENCOUNTER
Refill request for GLUCOSE TEST STRIPS medication.      Name of Pharmacy- [facility]      Last visit - 3/21/23     Pending visit - 9/19/23    Last refill -2/6/2019      Medication Contract signed -   Last Oarrs ran-         Additional Comments

## 2023-04-06 DIAGNOSIS — F41.1 GAD (GENERALIZED ANXIETY DISORDER): ICD-10-CM

## 2023-04-06 RX ORDER — ALPRAZOLAM 1 MG/1
1-1.5 TABLET ORAL NIGHTLY PRN
Qty: 45 TABLET | Refills: 2 | Status: SHIPPED | OUTPATIENT
Start: 2023-04-06 | End: 2023-07-05

## 2023-04-06 NOTE — TELEPHONE ENCOUNTER
Refill request for ALPRAZOLAM medication.      Name of Pharmacy- [facility]      Last visit - 3/21/23     Pending visit - 9/19/23    Last refill -2/27/23      Medication Contract signed -   Last Oarrs ran-         Additional Comments

## 2023-04-06 NOTE — TELEPHONE ENCOUNTER
----- Message from Yoselin Hannah sent at 4/6/2023  6:28 AM EDT -----  Regarding: Xanax refill   Contact: [phone]  Can you please refill my prescription for Xanax.   Thank you, Roman Knight

## 2023-04-06 NOTE — TELEPHONE ENCOUNTER
Refill request for Xanax. medication.      Name of Pharmacy- [facility]      Last visit - 3-     Pending visit - 9-    Last refill - 12-      Medication Contract signed - 12-  Last Rosanna Holiday ran- 12-        Additional Comments

## 2023-04-25 RX ORDER — PANTOPRAZOLE SODIUM 40 MG/1
TABLET, DELAYED RELEASE ORAL
Qty: 90 TABLET | Refills: 1 | Status: SHIPPED | OUTPATIENT
Start: 2023-04-25

## 2023-04-25 NOTE — TELEPHONE ENCOUNTER
Refill request for pantoprazole (PROTONIX) 40 MG tablet medication.      Name of Pharmacy- [facility]      Last visit - 3/21/23     Pending visit - 9/19/23    Last refill - 1/12/23      Medication Contract signed - PDMP Monitoring:    Last PDMP Jackson Dukes as Reviewed:  Review User Review Instant Review Result   DOROTHEA SERRANO 12/13/2022  7:30 AM Reviewed PDMP [1]     [unfilled]  Urine Drug Screenings (1 yr)       Drug screen multi urine  Collected: 11/30/2014  6:15 PM (Final result)                  Medication Contract and Consent for Opioid Use Documents Filed       Patient Documents       Type of Document Status Date Received Received By Description    Medication Contract [Status Missing]  ASHUTOSH KAUR Medication Agreement 6/18/15                   Last Oarrs ran-         Additional Comments

## 2023-05-22 DIAGNOSIS — R60.9 EDEMA, UNSPECIFIED TYPE: ICD-10-CM

## 2023-05-22 DIAGNOSIS — I10 ESSENTIAL HYPERTENSION: ICD-10-CM

## 2023-05-23 RX ORDER — HYDROCHLOROTHIAZIDE 25 MG/1
TABLET ORAL
Qty: 90 TABLET | Refills: 0 | Status: SHIPPED | OUTPATIENT
Start: 2023-05-23

## 2023-06-20 DIAGNOSIS — E11.65 TYPE 2 DIABETES MELLITUS WITH HYPERGLYCEMIA, WITHOUT LONG-TERM CURRENT USE OF INSULIN (HCC): ICD-10-CM

## 2023-06-20 RX ORDER — CYANOCOBALAMIN 1000 UG/ML
INJECTION, SOLUTION INTRAMUSCULAR; SUBCUTANEOUS
Qty: 3 ML | Refills: 3 | Status: SHIPPED | OUTPATIENT
Start: 2023-06-20

## 2023-06-20 NOTE — TELEPHONE ENCOUNTER
Refill request for Dulaglutide 4.5 MG/0.5ML SOPN medication.      Name of Pharmacy- [facility]      Last visit - 3-     Pending visit - 9-    Last refill - 3-      Medication Contract signed -   Last Oskar contreras-         Additional Comments

## 2023-06-20 NOTE — TELEPHONE ENCOUNTER
Refill request for cyanocobalamin 1000 MCG/ML injection medication.      Name of Pharmacy- [facility]      Last visit - 3-     Pending visit - N/A    Last refill - 5-      Medication Contract signed -   Liborio contreras-         Additional Comments

## 2023-06-20 NOTE — TELEPHONE ENCOUNTER
Refill request for CYANOCOBALAMIN 1,000 MCG/ML VL medication.      Name of Pharmacy- [facility]      Last visit - 3-     Pending visit - 9-    Last refill - 5-      Medication Contract signed -   Liborio contrears-         Additional Comments

## 2023-06-20 NOTE — TELEPHONE ENCOUNTER
Refill request for TRULICITY 4.5 HZ/2.9 ML PEN medication.      Name of Pharmacy- [facility]      Last visit - 3-     Pending visit - 9-    Last refill - 5-5-2022      Medication Contract signed -   Liborio contreras-         Additional Comments

## 2023-06-21 RX ORDER — DULAGLUTIDE 4.5 MG/.5ML
INJECTION, SOLUTION SUBCUTANEOUS
Qty: 2 ADJUSTABLE DOSE PRE-FILLED PEN SYRINGE | Refills: 2 | Status: SHIPPED | OUTPATIENT
Start: 2023-06-21

## 2023-06-21 RX ORDER — CYANOCOBALAMIN 1000 UG/ML
INJECTION, SOLUTION INTRAMUSCULAR; SUBCUTANEOUS
Qty: 1 ML | Refills: 11 | Status: SHIPPED | OUTPATIENT
Start: 2023-06-21

## 2023-07-28 NOTE — TELEPHONE ENCOUNTER
Refill request for METFORMIN medication.      Name of Pharmacy- [facility]      Last visit - 3/21/23     Pending visit - 9/19/23    Last refill -6/29/23      Medication Contract signed -   Last Oarrs ran-         Additional Comments

## 2023-07-31 RX ORDER — METFORMIN HYDROCHLORIDE 500 MG/1
2000 TABLET, EXTENDED RELEASE ORAL
Qty: 360 TABLET | Refills: 1 | Status: SHIPPED | OUTPATIENT
Start: 2023-07-31

## 2023-08-08 DIAGNOSIS — F41.1 GAD (GENERALIZED ANXIETY DISORDER): ICD-10-CM

## 2023-08-09 DIAGNOSIS — F41.1 GAD (GENERALIZED ANXIETY DISORDER): ICD-10-CM

## 2023-08-09 RX ORDER — ALPRAZOLAM 1 MG/1
1-1.5 TABLET ORAL NIGHTLY PRN
Qty: 45 TABLET | Refills: 2 | OUTPATIENT
Start: 2023-08-09 | End: 2023-11-07

## 2023-08-09 RX ORDER — ALPRAZOLAM 1 MG/1
1-1.5 TABLET ORAL NIGHTLY PRN
Qty: 45 TABLET | Refills: 2 | Status: SHIPPED | OUTPATIENT
Start: 2023-08-09 | End: 2023-11-07

## 2023-08-09 NOTE — TELEPHONE ENCOUNTER
----- Message from Norma Villagomez sent at 8/8/2023  7:20 PM EDT -----  Regarding: Xanax refill   Contact: [phone]  Can you please refill my prescription.   Thank you, Jessie Figueroa

## 2023-08-09 NOTE — TELEPHONE ENCOUNTER
Refill request for XANAX medication.      Name of Pharmacy- [facility]      Last visit - 3-     Pending visit - 9-    Last refill - 7-9-2023      Medication Contract signed -12-   Last Sanjeev contreras- 12-        Additional Comments

## 2023-08-09 NOTE — TELEPHONE ENCOUNTER
Refill request for ALPRAZOLAM medication.      Name of Pharmacy- [facility]      Last visit - 3/21/23     Pending visit - 9/19/23    Last refill -6/30/23      Medication Contract signed -   Last Oarrs ran-         Additional Comments

## 2023-09-12 ENCOUNTER — OFFICE VISIT (OUTPATIENT)
Dept: ENDOCRINOLOGY | Age: 53
End: 2023-09-12
Payer: COMMERCIAL

## 2023-09-12 VITALS
BODY MASS INDEX: 36.14 KG/M2 | SYSTOLIC BLOOD PRESSURE: 128 MMHG | RESPIRATION RATE: 16 BRPM | HEIGHT: 63 IN | HEART RATE: 75 BPM | WEIGHT: 204 LBS | DIASTOLIC BLOOD PRESSURE: 89 MMHG

## 2023-09-12 DIAGNOSIS — E11.65 TYPE 2 DIABETES MELLITUS WITH HYPERGLYCEMIA, WITHOUT LONG-TERM CURRENT USE OF INSULIN (HCC): ICD-10-CM

## 2023-09-12 DIAGNOSIS — Z13.29 SCREENING FOR THYROID DISORDER: ICD-10-CM

## 2023-09-12 DIAGNOSIS — Z13.29 SCREENING FOR THYROID DISORDER: Primary | ICD-10-CM

## 2023-09-12 DIAGNOSIS — R49.0 HOARSENESS OF VOICE: ICD-10-CM

## 2023-09-12 LAB
T4 FREE SERPL-MCNC: 1.2 NG/DL (ref 0.9–1.8)
TSH SERPL DL<=0.005 MIU/L-ACNC: 2.97 UIU/ML (ref 0.27–4.2)

## 2023-09-12 PROCEDURE — 3079F DIAST BP 80-89 MM HG: CPT | Performed by: INTERNAL MEDICINE

## 2023-09-12 PROCEDURE — 99214 OFFICE O/P EST MOD 30 MIN: CPT | Performed by: INTERNAL MEDICINE

## 2023-09-12 PROCEDURE — 3074F SYST BP LT 130 MM HG: CPT | Performed by: INTERNAL MEDICINE

## 2023-09-12 NOTE — PROGRESS NOTES
[address] [facility]        Chief Complaint   Patient presents with    New Patient     thyroid       Melvin Mcknight is a very pleasant 45y.o. year old female here for evaluation for possible thyroid dysfunction. Patient also has type 2 diabetes, hyperlipidemia, peptic ulcer disease, hypertension, AMANDA, BMI of 36 and prior history of autoimmune hepatitis. Patient reports family history of thyroid dysfunction in both sisters. 1 sister is diagnosed with Hashimoto's thyroiditis. Patient reports that for the past 3 months, she has been noticing raspy voice. In January 2023, she had ear infections and required a course of steroids. She denies any difficulty swallowing or breathing. Given changes in voice, she is here for evaluation for possible thyroid enlargement. She reports having a thyroid lump that was monitored in the past.  No FNA is previously required. Per chart review, thyroid ultrasound completed in October 2019 showed mild heterogeneous thyroid without evidence of focal abnormality, no nodules noted and no abnormal lymph nodes. TPO Ab was elevated at 39 in 2019. TSH checked in March 2023 was normal at 2.0. Patient also has type 2 diabetes, currently monitored by her PCP. She is currently on metformin and Trulicity. She has plan to follow-up with repeat A1c next week. Review of systems otherwise positive for weight loss, blurred vision, headaches, nausea and diarrhea. She also reports bone pain, excessive thirst and heat intolerance. Review of systems otherwise negative. Patient works as an . She does not smoke and drinks rarely. No illicit drug use. She travels over the wintertime to [place].     ALLERGIES:  Allergies   Allergen Reactions    Codeine Hives and Nausea And Vomiting    Vicodin [Hydrocodone-Acetaminophen] Nausea And Vomiting        MEDICATIONS:  Outpatient Medications Marked as Taking for the 9/12/23 encounter (Office Visit) with Abbey Dailey MD

## 2023-09-13 LAB
EST. AVERAGE GLUCOSE BLD GHB EST-MCNC: 171.4 MG/DL
HBA1C MFR BLD: 7.6 %

## 2023-09-14 DIAGNOSIS — E11.65 TYPE 2 DIABETES MELLITUS WITH HYPERGLYCEMIA, WITHOUT LONG-TERM CURRENT USE OF INSULIN (HCC): ICD-10-CM

## 2023-09-14 NOTE — TELEPHONE ENCOUNTER
Refill request for TRULICITY 4.5 BH/0.0 ML PEN medication.      Name of Pharmacy- [facility]      Last visit - 3-     Pending visit - 9-    Last refill - 6-      Medication Contract signed -   Last Miguel Ángel contreras-         Additional Comments

## 2023-09-14 NOTE — TELEPHONE ENCOUNTER
Refill request for Dulaglutide (TRULICITY) 4.5 LC/9.9RB SOPN medication.      Name of Pharmacy- [facility]      Last visit - 3-     Pending visit - 9-    Last refill - 6-      Medication Contract signed -   Liborio contreras-         Additional Comments

## 2023-09-15 RX ORDER — DULAGLUTIDE 4.5 MG/.5ML
INJECTION, SOLUTION SUBCUTANEOUS
Qty: 2 ADJUSTABLE DOSE PRE-FILLED PEN SYRINGE | Refills: 2 | OUTPATIENT
Start: 2023-09-15

## 2023-09-15 RX ORDER — DULAGLUTIDE 4.5 MG/.5ML
INJECTION, SOLUTION SUBCUTANEOUS
Qty: 4 ADJUSTABLE DOSE PRE-FILLED PEN SYRINGE | Refills: 2 | Status: SHIPPED | OUTPATIENT
Start: 2023-09-15

## 2023-10-11 RX ORDER — PANTOPRAZOLE SODIUM 40 MG/1
TABLET, DELAYED RELEASE ORAL
Qty: 30 TABLET | Refills: 5 | Status: SHIPPED | OUTPATIENT
Start: 2023-10-11

## 2023-10-11 NOTE — TELEPHONE ENCOUNTER
Refill request for PANTOPRAZOLE SOD  40 MG TAB medication.      Name of Pharmacy- [facility]      Last visit - 3-     Pending visit - N/A    Last refill - 9-      Medication Contract signed -   Liborio contreras-         Additional Comments

## 2023-10-12 ENCOUNTER — HOSPITAL ENCOUNTER (OUTPATIENT)
Dept: WOMENS IMAGING | Age: 53
Discharge: HOME | End: 2023-10-12
Payer: COMMERCIAL

## 2023-10-12 DIAGNOSIS — R92.1 BREAST CALCIFICATIONS ON MAMMOGRAM: ICD-10-CM

## 2023-10-12 PROBLEM — Z13.29 SCREENING FOR THYROID DISORDER: Status: RESOLVED | Noted: 2023-09-12 | Resolved: 2023-10-12

## 2023-10-12 PROCEDURE — G0279 TOMOSYNTHESIS, MAMMO: HCPCS

## 2023-10-13 DIAGNOSIS — R60.9 EDEMA, UNSPECIFIED TYPE: ICD-10-CM

## 2023-10-13 DIAGNOSIS — I10 ESSENTIAL HYPERTENSION: ICD-10-CM

## 2023-10-13 RX ORDER — HYDROCHLOROTHIAZIDE 25 MG/1
25 TABLET ORAL DAILY
Qty: 90 TABLET | Refills: 0 | Status: SHIPPED | OUTPATIENT
Start: 2023-10-13

## 2023-10-13 NOTE — TELEPHONE ENCOUNTER
Refill request for hydroCHLOROthiazide (HYDRODIURIL) 25 MG tablet medication.      Name of Pharmacy- [facility]      Last visit - 3-     Pending visit - N/A    Last refill - 5-      Medication Contract signed -   Liborio Madrid ran-         Additional Comments

## 2023-10-16 DIAGNOSIS — N64.59 ABNORMAL BREAST FINDING: Primary | ICD-10-CM

## 2023-11-13 ENCOUNTER — OFFICE VISIT (OUTPATIENT)
Dept: INTERNAL MEDICINE CLINIC | Age: 53
End: 2023-11-13
Payer: COMMERCIAL

## 2023-11-13 VITALS
OXYGEN SATURATION: 98 % | SYSTOLIC BLOOD PRESSURE: 125 MMHG | WEIGHT: 209.2 LBS | TEMPERATURE: 97.2 F | HEART RATE: 71 BPM | HEIGHT: 64 IN | DIASTOLIC BLOOD PRESSURE: 85 MMHG | BODY MASS INDEX: 35.71 KG/M2

## 2023-11-13 DIAGNOSIS — H65.191 OTHER NON-RECURRENT ACUTE NONSUPPURATIVE OTITIS MEDIA OF RIGHT EAR: Primary | ICD-10-CM

## 2023-11-13 PROCEDURE — 3079F DIAST BP 80-89 MM HG: CPT | Performed by: NURSE PRACTITIONER

## 2023-11-13 PROCEDURE — 99213 OFFICE O/P EST LOW 20 MIN: CPT | Performed by: NURSE PRACTITIONER

## 2023-11-13 PROCEDURE — 3074F SYST BP LT 130 MM HG: CPT | Performed by: NURSE PRACTITIONER

## 2023-11-13 RX ORDER — AMOXICILLIN 875 MG/1
875 TABLET, COATED ORAL 2 TIMES DAILY
Qty: 14 TABLET | Refills: 0 | Status: SHIPPED | OUTPATIENT
Start: 2023-11-13 | End: 2023-11-20

## 2023-11-13 RX ORDER — IBUPROFEN 800 MG/1
800 TABLET ORAL 3 TIMES DAILY PRN
Qty: 90 TABLET | Refills: 0 | Status: SHIPPED | OUTPATIENT
Start: 2023-11-13

## 2023-11-13 RX ORDER — ACETAMINOPHEN 500 MG
500 TABLET ORAL 4 TIMES DAILY PRN
Qty: 120 TABLET | Refills: 0 | Status: SHIPPED | OUTPATIENT
Start: 2023-11-13

## 2023-11-13 ASSESSMENT — ENCOUNTER SYMPTOMS
CONSTIPATION: 0
DIARRHEA: 0
CHEST TIGHTNESS: 0
VOMITING: 0
NAUSEA: 0
ABDOMINAL DISTENTION: 0
SHORTNESS OF BREATH: 0

## 2023-11-13 NOTE — PROGRESS NOTES
[address] [facility]  [address] [facility]  Malcolm Levine, [address] [facility]  Tel:[phone]    María Elena Thomason is a 52 y.o. female who presents today for her medical conditions/complaints as noted below. María Elena Thomason is c/o of Annual Exam (Physical-dizziness (comes and goes))      Chief Complaint   Patient presents with    Annual Exam     Physical-dizziness (comes and goes)       HPI:     Dizziness: Patient complains of a 3 week history of vertigo - patient feels like she is spinning and a sense of imbalance. Symptoms are intermittent, occuring a few times per week, and lasting several seconds per episode. Overall, the symptoms have been unchanged over time. Symptoms are exacerbated by motion. Associated symptoms:  otalgia- right ear, ear pressure- right ear, and sinus congestion. She denies any other symptoms. Relevant PMH: no pertinent PMH. Recent medication changes:  none. She has been treated with nothing. Previous work up:  none. Of note she has traveled quite a bit in the past several months and has felt consistent sinus pressure overall.              Past Medical History:   Diagnosis Date    Anxiety     B12 deficiency     Cholelithiasis 11/30/2014    Depression 9/12/2013    Diabetes mellitus type 2, uncontrolled 5/9/2012    Edema 5/9/2012    Edema of hand     Edema of lower extremity     Fatigue 11/30/2014    Goiter 10/2/2019    H/O total hysterectomy 2/5/2018    Hepatitis     Hyperglycemia     Hyperlipidemia     Hypertension     Insomnia     Leg edema 6/18/2015    Obesity     PUD (peptic ulcer disease)     Symptomatic menopausal or female climacteric states 7/28/2022    Vertigo     Vitamin D deficiency 6/18/2015    Vitamin D insufficiency         Past Surgical History:   Procedure Laterality Date    BLADDER SURGERY  2000    bladder suspension    BREAST ENHANCEMENT SURGERY Bilateral 03/04/2021    Dr. Mason Babcock, [address]

## 2023-12-05 DIAGNOSIS — E11.65 TYPE 2 DIABETES MELLITUS WITH HYPERGLYCEMIA, WITHOUT LONG-TERM CURRENT USE OF INSULIN (HCC): ICD-10-CM

## 2023-12-05 RX ORDER — DULAGLUTIDE 4.5 MG/.5ML
INJECTION, SOLUTION SUBCUTANEOUS
Qty: 4 ADJUSTABLE DOSE PRE-FILLED PEN SYRINGE | Refills: 2 | Status: SHIPPED | OUTPATIENT
Start: 2023-12-05

## 2023-12-05 NOTE — TELEPHONE ENCOUNTER
Refill request for Dulaglutide (TRULICITY) 4.5 LJ/5.1MB SOPN medication.      Name of Pharmacy- [facility]      Last visit - 11-     Pending visit - 5-    Last refill - 9-      Medication Contract signed -   Liborio contreras-         Additional Comments

## 2023-12-12 DIAGNOSIS — F41.1 GAD (GENERALIZED ANXIETY DISORDER): ICD-10-CM

## 2023-12-12 RX ORDER — ALPRAZOLAM 1 MG/1
1-1.5 TABLET ORAL NIGHTLY PRN
Qty: 45 TABLET | Refills: 2 | Status: SHIPPED | OUTPATIENT
Start: 2023-12-12 | End: 2024-03-11

## 2023-12-12 NOTE — TELEPHONE ENCOUNTER
Refill request for  medication.      ALPRAZOLAM 1 MG     Name of Pharmacy- [facility]      Last visit - 11/13/23     Pending visit - NONE     Last refill -8/9/23      PDMP Monitoring:    Last PDMP Harley Nunez as Reviewed:  Review User Review Instant Review Result   DOROTHEA SERRANO 12/13/2022  7:30 AM Reviewed PDMP [1]     [unfilled]  Urine Drug Screenings (1 yr)       Drug screen multi urine  Collected: 11/30/2014  6:15 PM (Final result)                  Medication Contract and Consent for Opioid Use Documents Filed       Patient Documents       Type of Document Status Date Received Received By Description    Medication Contract [Status Missing]  ASHUTOSH KAUR Medication Agreement 6/18/15                         Additional Comments

## 2024-01-04 DIAGNOSIS — I10 ESSENTIAL HYPERTENSION: ICD-10-CM

## 2024-01-04 DIAGNOSIS — R60.9 EDEMA, UNSPECIFIED TYPE: ICD-10-CM

## 2024-01-04 RX ORDER — HYDROCHLOROTHIAZIDE 25 MG/1
25 TABLET ORAL DAILY
Qty: 90 TABLET | Refills: 1 | Status: SHIPPED | OUTPATIENT
Start: 2024-01-04

## 2024-01-04 NOTE — TELEPHONE ENCOUNTER
Refill request for HCTZ medication.     Name of Pharmacy- [facility]      Last visit - 11/13/23     Pending visit - NONE    Last refill -10/13/23      Medication Contract signed -   Last Oarrs ran-         Additional Comments

## 2024-01-24 RX ORDER — SERTRALINE HYDROCHLORIDE 25 MG/1
25 TABLET, FILM COATED ORAL DAILY
Qty: 90 TABLET | Refills: 3 | OUTPATIENT
Start: 2024-01-24

## 2024-01-24 RX ORDER — SERTRALINE HYDROCHLORIDE 25 MG/1
25 TABLET, FILM COATED ORAL DAILY
Qty: 90 TABLET | Refills: 3 | Status: SHIPPED | OUTPATIENT
Start: 2024-01-24

## 2024-01-24 RX ORDER — CYANOCOBALAMIN 1000 UG/ML
INJECTION, SOLUTION INTRAMUSCULAR; SUBCUTANEOUS
Qty: 3 ML | Refills: 3 | Status: SHIPPED | OUTPATIENT
Start: 2024-01-24

## 2024-01-24 NOTE — TELEPHONE ENCOUNTER
Refill request for SERTRALINE 50MG 25MB B 12 INJECTION medication.     Name of Pharmacy- [facility]      Last visit - 11/13/23     Pending visit - NONE    Last refill -12/6/22, 6/20/23      Medication Contract signed -   Last Oarrs ran-         Additional Comments

## 2024-02-13 ENCOUNTER — PATIENT MESSAGE (OUTPATIENT)
Dept: INTERNAL MEDICINE CLINIC | Age: 54
End: 2024-02-13

## 2024-02-13 DIAGNOSIS — B37.9 YEAST INFECTION: Primary | ICD-10-CM

## 2024-02-13 RX ORDER — FLUCONAZOLE 150 MG/1
150 TABLET ORAL ONCE
Qty: 1 TABLET | Refills: 0 | Status: SHIPPED | OUTPATIENT
Start: 2024-02-13 | End: 2024-02-13

## 2024-02-13 NOTE — TELEPHONE ENCOUNTER
Patient requesting a prescription for Diflucan.      Pended Order   
Spontaneous, unlabored and symmetrical

## 2024-02-19 DIAGNOSIS — E11.65 TYPE 2 DIABETES MELLITUS WITH HYPERGLYCEMIA, WITHOUT LONG-TERM CURRENT USE OF INSULIN (HCC): ICD-10-CM

## 2024-02-19 RX ORDER — DULAGLUTIDE 4.5 MG/.5ML
INJECTION, SOLUTION SUBCUTANEOUS
Qty: 4 ADJUSTABLE DOSE PRE-FILLED PEN SYRINGE | Refills: 2 | Status: SHIPPED | OUTPATIENT
Start: 2024-02-19

## 2024-02-19 NOTE — TELEPHONE ENCOUNTER
Refill request for Dulaglutide (TRULICITY) 4.5 MG/0.5ML SOPN medication.     Name of Pharmacy- [facility]      Last visit - 11/13/23     Pending visit - none    Last refill - 12/5/23      Medication Contract signed - PDMP Monitoring:    Last PDMP Ángel as Reviewed:  Review User Review Instant Review Result   ALBERTO DUPONT 12/12/2023  9:44 AM Reviewed PDMP [1]     [unfilled]  Urine Drug Screenings (1 yr)       Drug screen multi urine  Collected: 11/30/2014  6:15 PM (Final result)                  Medication Contract and Consent for Opioid Use Documents Filed       Patient Documents       Type of Document Status Date Received Received By Description    Medication Contract [Status Missing]  ASHUTOSH KAUR Medication Agreement 6/18/15                    Last Oarrs ran-         Additional Comments

## 2024-02-21 ENCOUNTER — PATIENT MESSAGE (OUTPATIENT)
Dept: INTERNAL MEDICINE CLINIC | Age: 54
End: 2024-02-21

## 2024-02-21 NOTE — TELEPHONE ENCOUNTER
From: Eugenia De Leon  To: Kenneth Kim  Sent: 2/21/2024 10:48 AM EST  Subject: Appointment Request    Appointment Request From: Eugenia De Leon    With Provider: JAMEL Gurrola CNP [[facility]]    Preferred Date Range: Any date 3/7/2024 or later    Preferred Times: Any Time    Reason for visit: Office Visit    Comments:  Leg pain weird stomach sensation

## 2024-03-22 DIAGNOSIS — Z00.00 WELL ADULT EXAM: Primary | ICD-10-CM

## 2024-03-22 RX ORDER — METFORMIN HYDROCHLORIDE 500 MG/1
2000 TABLET, EXTENDED RELEASE ORAL
Qty: 360 TABLET | Refills: 1 | Status: SHIPPED | OUTPATIENT
Start: 2024-03-22

## 2024-03-22 NOTE — TELEPHONE ENCOUNTER
Called, spoke to patient  let her know that Metformin script has been sent to pharmacy.  Patient will call office once she checks her calendar to make a 40 minute appointment with Kenneth in June

## 2024-03-22 NOTE — TELEPHONE ENCOUNTER
Refill request for metFORMIN (GLUCOPHAGE-XR) 500 MG extended release tablet medication.     Name of [facility]acy- [facility]      Last visit - 11/13/23     Pending visit - None     Last refill - 7/31/23      Medication Contract signed - PDMP Monitoring:    Last PDMP Ángel as Reviewed:  Review User Review Instant Review Result   EVONLAURO BONNERESH 12/12/2023  9:44 AM Reviewed PDMP [1]     [unfilled]  Urine Drug Screenings (1 yr)       Drug screen multi urine  Collected: 11/30/2014  6:15 PM (Final result)                  Medication Contract and Consent for Opioid Use Documents Filed       Patient Documents       Type of Document Status Date Received Received By Description    Medication Contract [Status Missing]  ASHUTOSH KAUR Medication Agreement 6/18/15                   Last Oarrs ran-         Additional Comments

## 2024-04-08 DIAGNOSIS — F41.1 GAD (GENERALIZED ANXIETY DISORDER): ICD-10-CM

## 2024-04-08 RX ORDER — ALPRAZOLAM 1 MG/1
1 TABLET ORAL NIGHTLY PRN
Qty: 30 TABLET | Refills: 2 | Status: CANCELLED | OUTPATIENT
Start: 2024-04-08 | End: 2024-07-07

## 2024-04-08 NOTE — TELEPHONE ENCOUNTER
----- Message from Eugenia De Leon sent at 4/8/2024  7:34 AM EDT -----  Regarding: Xanax refill   Contact: [phone]  Can you please call in my prescription for Xanax? Thank you, Eugenia

## 2024-04-08 NOTE — TELEPHONE ENCOUNTER
Refill request for Xanax medication.     Name of Pharmacy- [facility]      Last visit - 11-     Pending visit - 5-1-2024    Last refill - 8-9-2023      Medication Contract signed - 12-  Last Oarrs ran- 12-        Additional Comments

## 2024-04-09 RX ORDER — ALPRAZOLAM 1 MG/1
1-1.5 TABLET ORAL NIGHTLY PRN
Qty: 45 TABLET | Refills: 2 | Status: SHIPPED | OUTPATIENT
Start: 2024-04-09 | End: 2024-07-08

## 2024-04-09 RX ORDER — ALPRAZOLAM 1 MG/1
1-1.5 TABLET ORAL NIGHTLY PRN
Qty: 45 TABLET | Refills: 2 | OUTPATIENT
Start: 2024-04-09 | End: 2024-07-08

## 2024-04-09 NOTE — TELEPHONE ENCOUNTER
Refill request for Alprazolam ( XANAX ) 1 mg  medication.     Name of Pharmacy- [facility]      Last visit - 11/12/23     Pending visit - 5/1/24    Last refill -5/10/12      Medication Contract signed -   Last Oarrs ran-         Additional Comments

## 2024-04-16 RX ORDER — NITROFURANTOIN 25; 75 MG/1; MG/1
100 CAPSULE ORAL 2 TIMES DAILY
Qty: 10 CAPSULE | Refills: 0 | Status: SHIPPED | OUTPATIENT
Start: 2024-04-16 | End: 2024-04-21

## 2024-04-29 RX ORDER — IBUPROFEN 800 MG/1
TABLET ORAL
Qty: 90 TABLET | Refills: 0 | Status: SHIPPED | OUTPATIENT
Start: 2024-04-29

## 2024-05-01 ENCOUNTER — OFFICE VISIT (OUTPATIENT)
Dept: INTERNAL MEDICINE CLINIC | Age: 54
End: 2024-05-01
Payer: COMMERCIAL

## 2024-05-01 ENCOUNTER — HOSPITAL ENCOUNTER (OUTPATIENT)
Age: 54
Discharge: HOME | End: 2024-05-01
Payer: COMMERCIAL

## 2024-05-01 VITALS
SYSTOLIC BLOOD PRESSURE: 138 MMHG | TEMPERATURE: 97.2 F | HEIGHT: 64 IN | DIASTOLIC BLOOD PRESSURE: 80 MMHG | OXYGEN SATURATION: 98 % | HEART RATE: 83 BPM | BODY MASS INDEX: 35.58 KG/M2 | WEIGHT: 208.4 LBS

## 2024-05-01 DIAGNOSIS — E78.2 MIXED HYPERLIPIDEMIA: ICD-10-CM

## 2024-05-01 DIAGNOSIS — E11.65 TYPE 2 DIABETES MELLITUS WITH HYPERGLYCEMIA, WITHOUT LONG-TERM CURRENT USE OF INSULIN (HCC): ICD-10-CM

## 2024-05-01 DIAGNOSIS — F41.1 GAD (GENERALIZED ANXIETY DISORDER): ICD-10-CM

## 2024-05-01 DIAGNOSIS — Z00.00 WELL ADULT EXAM: ICD-10-CM

## 2024-05-01 DIAGNOSIS — I10 ESSENTIAL HYPERTENSION: ICD-10-CM

## 2024-05-01 DIAGNOSIS — E06.3 HASHIMOTO'S THYROIDITIS: ICD-10-CM

## 2024-05-01 DIAGNOSIS — G47.00 INSOMNIA, UNSPECIFIED TYPE: ICD-10-CM

## 2024-05-01 DIAGNOSIS — Z12.11 SCREENING FOR COLON CANCER: Primary | ICD-10-CM

## 2024-05-01 LAB
25(OH)D3 SERPL-MCNC: 31.1 NG/ML
ALBUMIN SERPL-MCNC: 4.5 G/DL (ref 3.4–5)
ALBUMIN/GLOB SERPL: 1.6 {RATIO} (ref 1.1–2.2)
ALP SERPL-CCNC: 73 U/L (ref 40–129)
ALT SERPL-CCNC: 13 U/L (ref 10–40)
ANION GAP SERPL CALCULATED.3IONS-SCNC: 15 MMOL/L (ref 3–16)
AST SERPL-CCNC: 20 U/L (ref 15–37)
BASOPHILS # BLD: 0 K/UL (ref 0–0.2)
BASOPHILS NFR BLD: 0.5 %
BILIRUB SERPL-MCNC: 0.3 MG/DL (ref 0–1)
BUN SERPL-MCNC: 11 MG/DL (ref 7–20)
CALCIUM SERPL-MCNC: 9.8 MG/DL (ref 8.3–10.6)
CHLORIDE SERPL-SCNC: 100 MMOL/L (ref 99–110)
CHOLEST SERPL-MCNC: 215 MG/DL (ref 0–199)
CO2 SERPL-SCNC: 23 MMOL/L (ref 21–32)
CREAT SERPL-MCNC: 0.5 MG/DL (ref 0.6–1.1)
CREAT UR-MCNC: 90.3 MG/DL (ref 28–259)
DEPRECATED RDW RBC AUTO: 13.1 % (ref 12.4–15.4)
EOSINOPHIL # BLD: 0.1 K/UL (ref 0–0.6)
EOSINOPHIL NFR BLD: 1 %
GFR SERPLBLD CREATININE-BSD FMLA CKD-EPI: >90 ML/MIN/{1.73_M2}
GLUCOSE SERPL-MCNC: 150 MG/DL (ref 70–99)
HCT VFR BLD AUTO: 39.7 % (ref 36–48)
HDLC SERPL-MCNC: 59 MG/DL (ref 40–60)
HGB BLD-MCNC: 13.5 G/DL (ref 12–16)
LDLC SERPL CALC-MCNC: 130 MG/DL
LYMPHOCYTES # BLD: 2.1 K/UL (ref 1–5.1)
LYMPHOCYTES NFR BLD: 27.8 %
MCH RBC QN AUTO: 29.7 PG (ref 26–34)
MCHC RBC AUTO-ENTMCNC: 34.1 G/DL (ref 31–36)
MCV RBC AUTO: 87.3 FL (ref 80–100)
MICROALBUMIN UR DL<=1MG/L-MCNC: <1.2 MG/DL
MICROALBUMIN/CREAT UR: NORMAL MG/G (ref 0–30)
MONOCYTES # BLD: 0.6 K/UL (ref 0–1.3)
MONOCYTES NFR BLD: 7.9 %
NEUTROPHILS # BLD: 4.8 K/UL (ref 1.7–7.7)
NEUTROPHILS NFR BLD: 62.8 %
PLATELET # BLD AUTO: 299 K/UL (ref 135–450)
PMV BLD AUTO: 8 FL (ref 5–10.5)
POTASSIUM SERPL-SCNC: 4.1 MMOL/L (ref 3.5–5.1)
PROT SERPL-MCNC: 7.3 G/DL (ref 6.4–8.2)
RBC # BLD AUTO: 4.54 M/UL (ref 4–5.2)
SODIUM SERPL-SCNC: 138 MMOL/L (ref 136–145)
TRIGL SERPL-MCNC: 128 MG/DL (ref 0–150)
TSH SERPL DL<=0.005 MIU/L-ACNC: 1.8 UIU/ML (ref 0.27–4.2)
VLDLC SERPL CALC-MCNC: 26 MG/DL
WBC # BLD AUTO: 7.7 K/UL (ref 4–11)

## 2024-05-01 PROCEDURE — 99214 OFFICE O/P EST MOD 30 MIN: CPT | Performed by: NURSE PRACTITIONER

## 2024-05-01 PROCEDURE — 84443 ASSAY THYROID STIM HORMONE: CPT

## 2024-05-01 PROCEDURE — 83036 HEMOGLOBIN GLYCOSYLATED A1C: CPT

## 2024-05-01 PROCEDURE — 36415 COLL VENOUS BLD VENIPUNCTURE: CPT

## 2024-05-01 PROCEDURE — 80061 LIPID PANEL: CPT

## 2024-05-01 PROCEDURE — 80053 COMPREHEN METABOLIC PANEL: CPT

## 2024-05-01 PROCEDURE — 3079F DIAST BP 80-89 MM HG: CPT | Performed by: NURSE PRACTITIONER

## 2024-05-01 PROCEDURE — 82043 UR ALBUMIN QUANTITATIVE: CPT

## 2024-05-01 PROCEDURE — 82306 VITAMIN D 25 HYDROXY: CPT

## 2024-05-01 PROCEDURE — 82570 ASSAY OF URINE CREATININE: CPT

## 2024-05-01 PROCEDURE — 85025 COMPLETE CBC W/AUTO DIFF WBC: CPT

## 2024-05-01 PROCEDURE — 3075F SYST BP GE 130 - 139MM HG: CPT | Performed by: NURSE PRACTITIONER

## 2024-05-01 RX ORDER — INSULIN LISPRO 100 [IU]/ML
5 INJECTION, SOLUTION INTRAVENOUS; SUBCUTANEOUS
Qty: 1 ADJUSTABLE DOSE PRE-FILLED PEN SYRINGE | Refills: 1 | Status: SHIPPED | OUTPATIENT
Start: 2024-05-01

## 2024-05-01 RX ORDER — DULAGLUTIDE 4.5 MG/.5ML
INJECTION, SOLUTION SUBCUTANEOUS
Qty: 12 ADJUSTABLE DOSE PRE-FILLED PEN SYRINGE | Refills: 3 | Status: SHIPPED | OUTPATIENT
Start: 2024-05-01

## 2024-05-01 SDOH — ECONOMIC STABILITY: FOOD INSECURITY: WITHIN THE PAST 12 MONTHS, YOU WORRIED THAT YOUR FOOD WOULD RUN OUT BEFORE YOU GOT MONEY TO BUY MORE.: NEVER TRUE

## 2024-05-01 SDOH — ECONOMIC STABILITY: FOOD INSECURITY: WITHIN THE PAST 12 MONTHS, THE FOOD YOU BOUGHT JUST DIDN'T LAST AND YOU DIDN'T HAVE MONEY TO GET MORE.: NEVER TRUE

## 2024-05-01 SDOH — ECONOMIC STABILITY: INCOME INSECURITY: HOW HARD IS IT FOR YOU TO PAY FOR THE VERY BASICS LIKE FOOD, HOUSING, MEDICAL CARE, AND HEATING?: NOT HARD AT ALL

## 2024-05-01 ASSESSMENT — PATIENT HEALTH QUESTIONNAIRE - PHQ9
8. MOVING OR SPEAKING SO SLOWLY THAT OTHER PEOPLE COULD HAVE NOTICED. OR THE OPPOSITE, BEING SO FIGETY OR RESTLESS THAT YOU HAVE BEEN MOVING AROUND A LOT MORE THAN USUAL: NOT AT ALL
9. THOUGHTS THAT YOU WOULD BE BETTER OFF DEAD, OR OF HURTING YOURSELF: NOT AT ALL
6. FEELING BAD ABOUT YOURSELF - OR THAT YOU ARE A FAILURE OR HAVE LET YOURSELF OR YOUR FAMILY DOWN: NOT AT ALL
SUM OF ALL RESPONSES TO PHQ QUESTIONS 1-9: 0
SUM OF ALL RESPONSES TO PHQ QUESTIONS 1-9: 0
3. TROUBLE FALLING OR STAYING ASLEEP: NOT AT ALL
SUM OF ALL RESPONSES TO PHQ QUESTIONS 1-9: 0
SUM OF ALL RESPONSES TO PHQ QUESTIONS 1-9: 0
1. LITTLE INTEREST OR PLEASURE IN DOING THINGS: NOT AT ALL
10. IF YOU CHECKED OFF ANY PROBLEMS, HOW DIFFICULT HAVE THESE PROBLEMS MADE IT FOR YOU TO DO YOUR WORK, TAKE CARE OF THINGS AT HOME, OR GET ALONG WITH OTHER PEOPLE: NOT DIFFICULT AT ALL
4. FEELING TIRED OR HAVING LITTLE ENERGY: NOT AT ALL
7. TROUBLE CONCENTRATING ON THINGS, SUCH AS READING THE NEWSPAPER OR WATCHING TELEVISION: NOT AT ALL
2. FEELING DOWN, DEPRESSED OR HOPELESS: NOT AT ALL
SUM OF ALL RESPONSES TO PHQ9 QUESTIONS 1 & 2: 0
5. POOR APPETITE OR OVEREATING: NOT AT ALL

## 2024-05-01 ASSESSMENT — ENCOUNTER SYMPTOMS
CONSTIPATION: 0
NAUSEA: 0
CHEST TIGHTNESS: 0
SHORTNESS OF BREATH: 0
ABDOMINAL DISTENTION: 0
DIARRHEA: 0
VOMITING: 0

## 2024-05-01 NOTE — PROGRESS NOTES
[facility]  [facility]  [address] , [place] [address]  Tel:[phone]    Eugenia De Leon is a 53 y.o. female who presents today for her medical conditions/complaints as noted below.  Eugenia De Leon is c/o of Diabetes (Diabetes check )      Chief Complaint   Patient presents with    Diabetes     Diabetes check        HPI:       Ms. Marizol De Leon presents for her routine annual check up. She states she is overall well. 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 She states she is quite busy but doing well overall    Treatment Adherence:   Medication compliance:  compliant all of the time  Diet compliance:  compliant all of the time  Weight trend: stable  Current exercise: walks 4 time(s) per week  Barriers: none    Diabetes Mellitus Type 2: Current symptoms/problems include none.    Home blood sugar records: patient tests 1 time(s) per day  Any episodes of hypoglycemia? no  Eye exam current (within one year): yes  Tobacco history: She  reports that she has never smoked. She has never used smokeless tobacco.   Daily Aspirin? No    Hypertension:  Home blood pressure monitoring: Yes - intermittently.  She is adherent to a low sodium diet. Patient denies chest pain, shortness of breath, headache, lightheadedness, blurred vision, peripheral edema, palpitations, dry cough, and fatigue.  Antihypertensive medication side effects: no medication side effects noted.  Use of agents associated with hypertension: none.     Hyperlipidemia:  Controls via diet/exercise. Recent lipids elevated. Plans to repeat soon.     Lab Results       Component                Value               Date                       LABA1C                   7.6                 09/12/2023                 LABA1C                   8.2                 03/21/2023                 LABA1C                   6.8                 09/19/2022            Lab

## 2024-05-02 LAB
EST. AVERAGE GLUCOSE BLD GHB EST-MCNC: 157.1 MG/DL
HBA1C MFR BLD: 7.1 %

## 2024-05-04 DIAGNOSIS — I10 ESSENTIAL HYPERTENSION: ICD-10-CM

## 2024-05-04 DIAGNOSIS — R60.9 EDEMA, UNSPECIFIED TYPE: ICD-10-CM

## 2024-05-06 RX ORDER — HYDROCHLOROTHIAZIDE 25 MG/1
25 TABLET ORAL DAILY
Qty: 90 TABLET | Refills: 1 | Status: SHIPPED | OUTPATIENT
Start: 2024-05-06

## 2024-05-06 NOTE — TELEPHONE ENCOUNTER
Refill request for Hydrochlorothiazide 25 mg  medication.     Name of Pharmacy- [facility]      Last visit - 5/1/24     Pending visit - 11/4/24    Last refill -1/4/24      Medication Contract signed -   Last Oarrs ran-         Additional Comments

## 2024-05-08 ENCOUNTER — TELEPHONE (OUTPATIENT)
Dept: INTERNAL MEDICINE CLINIC | Age: 54
End: 2024-05-08

## 2024-05-08 RX ORDER — SEMAGLUTIDE 1.34 MG/ML
1 INJECTION, SOLUTION SUBCUTANEOUS
Qty: 4 ADJUSTABLE DOSE PRE-FILLED PEN SYRINGE | Refills: 0 | Status: SHIPPED | OUTPATIENT
Start: 2024-05-08

## 2024-05-08 NOTE — TELEPHONE ENCOUNTER
The patient is calling today stating she cannot find Trulicity at the pharmacy.  Her pharmacist asked her to call her PCP to see if she could be prescribed Ozempic with the equivalent of Trulicity 4.5mg       [phone] (home) [phone] (work)    Ph[facility]

## 2024-05-08 NOTE — TELEPHONE ENCOUNTER
Phoned the pharmacy and the manufacturers have not come out with a conversion at this time.      The patient is on the highest dose of Trulicity. The pharmacist did state the patient is less likely to have side effects d/t her current dosage of Trulicity if she starts Ozempic 1mg      Provider placed order.     Informed patient

## 2024-05-08 NOTE — TELEPHONE ENCOUNTER
Please contact them and see what dose they recommend as equivalent? I usually transition to 1 mg and increase to 2 mg if necessary

## 2024-05-09 ENCOUNTER — TELEPHONE (OUTPATIENT)
Dept: ADMINISTRATIVE | Age: 54
End: 2024-05-09

## 2024-05-09 NOTE — TELEPHONE ENCOUNTER
Submitted PA for Ozempic (1 MG/DOSE) 4MG/3ML pen-injectors  Via CMM Key: [id number] STATUS: Your PA request has been approved. Additional information will be provided in the approval communication. (Message 1148). Authorization Expiration Date: May 9, 2027.     Please notify patient. Thank you.

## 2024-06-18 ENCOUNTER — TELEPHONE (OUTPATIENT)
Dept: INTERNAL MEDICINE CLINIC | Age: 54
End: 2024-06-18

## 2024-06-18 NOTE — TELEPHONE ENCOUNTER
Called patient re: [facility] that patient has not had her 6 month follow up mammogram.  Left voice message requesting return call.  Per HIPAA unable to leave any details on voice mail

## 2024-06-18 NOTE — TELEPHONE ENCOUNTER
Patient called back to the office.  She was given the information that she is past due for a follow-up mammogram.  She stated she made the appt and had to cancel.  She was strongly encouraged to do this ASAP    She voiced understanding.

## 2024-07-23 ENCOUNTER — HOSPITAL ENCOUNTER (OUTPATIENT)
Dept: WOMENS IMAGING | Age: 54
Discharge: HOME | End: 2024-07-23
Payer: COMMERCIAL

## 2024-07-23 VITALS — BODY MASS INDEX: 33.31 KG/M2 | HEIGHT: 63 IN | WEIGHT: 188 LBS

## 2024-07-23 DIAGNOSIS — N64.59 ABNORMAL BREAST FINDING: ICD-10-CM

## 2024-07-23 PROCEDURE — G0279 TOMOSYNTHESIS, MAMMO: HCPCS

## 2024-08-07 ENCOUNTER — PATIENT MESSAGE (OUTPATIENT)
Dept: INTERNAL MEDICINE CLINIC | Age: 54
End: 2024-08-07

## 2024-08-07 DIAGNOSIS — F41.1 GAD (GENERALIZED ANXIETY DISORDER): ICD-10-CM

## 2024-08-07 NOTE — TELEPHONE ENCOUNTER
From: Eugenia De Leon  To: Kenneth Kim  Sent: 8/7/2024 1:50 PM EDT  Subject: Xanax    Hi can you please refill my prescription? Thank you, Eugenia

## 2024-08-08 RX ORDER — ALPRAZOLAM 1 MG/1
1-1.5 TABLET ORAL NIGHTLY PRN
Qty: 45 TABLET | Refills: 2 | Status: SHIPPED | OUTPATIENT
Start: 2024-08-08 | End: 2024-11-06

## 2024-09-18 RX ORDER — SEMAGLUTIDE 2.68 MG/ML
INJECTION, SOLUTION SUBCUTANEOUS
Qty: 500 ML | Refills: 1 | Status: SHIPPED | OUTPATIENT
Start: 2024-09-18 | End: 2024-09-19

## 2024-09-18 RX ORDER — METFORMIN HCL 500 MG
2000 TABLET, EXTENDED RELEASE 24 HR ORAL
Qty: 360 TABLET | Refills: 1 | Status: SHIPPED | OUTPATIENT
Start: 2024-09-18

## 2024-09-19 ENCOUNTER — TELEPHONE (OUTPATIENT)
Dept: INTERNAL MEDICINE CLINIC | Age: 54
End: 2024-09-19

## 2024-11-04 ENCOUNTER — OFFICE VISIT (OUTPATIENT)
Dept: INTERNAL MEDICINE CLINIC | Age: 54
End: 2024-11-04

## 2024-11-04 VITALS
BODY MASS INDEX: 35.72 KG/M2 | SYSTOLIC BLOOD PRESSURE: 126 MMHG | HEART RATE: 82 BPM | WEIGHT: 201.6 LBS | DIASTOLIC BLOOD PRESSURE: 76 MMHG | OXYGEN SATURATION: 98 % | HEIGHT: 63 IN | TEMPERATURE: 97.3 F

## 2024-11-04 DIAGNOSIS — E11.9 TYPE 2 DIABETES MELLITUS WITHOUT COMPLICATION, WITHOUT LONG-TERM CURRENT USE OF INSULIN (HCC): ICD-10-CM

## 2024-11-04 DIAGNOSIS — F42.9 OBSESSIVE-COMPULSIVE DISORDER, UNSPECIFIED TYPE: ICD-10-CM

## 2024-11-04 DIAGNOSIS — F32.A DEPRESSION, UNSPECIFIED DEPRESSION TYPE: ICD-10-CM

## 2024-11-04 DIAGNOSIS — Z23 NEED FOR INFLUENZA VACCINATION: Primary | ICD-10-CM

## 2024-11-04 DIAGNOSIS — K75.4 AUTOIMMUNE HEPATITIS (HCC): ICD-10-CM

## 2024-11-04 DIAGNOSIS — F41.1 GAD (GENERALIZED ANXIETY DISORDER): ICD-10-CM

## 2024-11-04 DIAGNOSIS — E78.2 MIXED HYPERLIPIDEMIA: ICD-10-CM

## 2024-11-04 DIAGNOSIS — I10 ESSENTIAL HYPERTENSION: ICD-10-CM

## 2024-11-04 DIAGNOSIS — E06.3 HASHIMOTO'S THYROIDITIS: ICD-10-CM

## 2024-11-04 ASSESSMENT — ENCOUNTER SYMPTOMS
CONSTIPATION: 0
NAUSEA: 0
ABDOMINAL DISTENTION: 0
CHEST TIGHTNESS: 0
SHORTNESS OF BREATH: 0
VOMITING: 0
DIARRHEA: 0

## 2024-11-04 NOTE — PROGRESS NOTES
HYSTERECTOMY, TOTAL ABDOMINAL (CERVIX REMOVED)  12/2004    cervix absent, ovaries intact    VENTRAL HERNIA REPAIR  10/2007       Family History   Problem Relation Age of Onset    High Cholesterol Mother     Heart Disease Father     High Blood Pressure Father     Diabetes Father     High Cholesterol Father     Thyroid Disease Sister     Cancer Maternal Aunt         breast    Breast Cancer Maternal Aunt 60    Heart Disease Paternal Uncle     Cancer Maternal Grandmother         breast    Breast Cancer Maternal Grandmother 65       Social History     Tobacco Use    Smoking status: Never    Smokeless tobacco: Never   Substance Use Topics    Alcohol use: Yes     Alcohol/week: 0.0 standard drinks of alcohol     Comment: SOCIALLY        Current Outpatient Medications   Medication Sig Dispense Refill    semaglutide, 2 MG/DOSE, (OZEMPIC) 8 MG/3ML SOPN sc injection Inject 2 mg into the skin every 7 days 9 mL 1    metFORMIN (GLUCOPHAGE-XR) 500 MG extended release tablet TAKE 4 TABLETS BY MOUTH DAILY (WITH BREAKFAST). 360 tablet 1    ALPRAZolam (XANAX) 1 MG tablet Take 1-1.5 tablets by mouth nightly as needed for Sleep for up to 90 days. 45 tablet 2    hydroCHLOROthiazide (HYDRODIURIL) 25 MG tablet TAKE 1 TABLET BY MOUTH EVERY DAY 90 tablet 1    insulin lispro, 1 Unit Dial, (HUMALOG KWIKPEN) 100 UNIT/ML SOPN Inject 5 Units into the skin 3 times daily (before meals) 1 Adjustable Dose Pre-filled Pen Syringe 1    ibuprofen (ADVIL;MOTRIN) 800 MG tablet TAKE 1 TABLET BY MOUTH THREE TIMES A DAY AS NEEDED FOR PAIN 90 tablet 0    sertraline (ZOLOFT) 50 MG tablet Take 1 tablet by mouth daily 90 tablet 3    sertraline (ZOLOFT) 25 MG tablet Take 1 tablet by mouth daily (in addition to a 50 mg pill daily) 90 tablet 3    cyanocobalamin 1000 MCG/ML injection INJECT 1 ML INTO THE MUSCLE FOR 1 DOSE MONTHLY. 3 mL 3    acetaminophen (TYLENOL) 500 MG tablet Take 1 tablet by mouth 4 times daily as needed for Pain 120 tablet 0    pantoprazole

## 2024-11-07 ENCOUNTER — HOSPITAL ENCOUNTER (OUTPATIENT)
Age: 54
Discharge: HOME | End: 2024-11-07
Payer: COMMERCIAL

## 2024-11-07 DIAGNOSIS — E11.9 TYPE 2 DIABETES MELLITUS WITHOUT COMPLICATION, WITHOUT LONG-TERM CURRENT USE OF INSULIN (HCC): ICD-10-CM

## 2024-11-07 DIAGNOSIS — F41.1 GAD (GENERALIZED ANXIETY DISORDER): ICD-10-CM

## 2024-11-07 LAB
ALBUMIN SERPL-MCNC: 4.5 G/DL (ref 3.4–5)
ALBUMIN/GLOB SERPL: 1.6 {RATIO} (ref 1.1–2.2)
ALP SERPL-CCNC: 92 U/L (ref 40–129)
ALT SERPL-CCNC: 12 U/L (ref 10–40)
AMPHETAMINES UR QL SCN>1000 NG/ML: ABNORMAL
ANION GAP SERPL CALCULATED.3IONS-SCNC: 13 MMOL/L (ref 3–16)
AST SERPL-CCNC: 19 U/L (ref 15–37)
BARBITURATES UR QL SCN>200 NG/ML: ABNORMAL
BENZODIAZ UR QL SCN>200 NG/ML: POSITIVE
BILIRUB SERPL-MCNC: 0.3 MG/DL (ref 0–1)
BUN SERPL-MCNC: 18 MG/DL (ref 7–20)
CALCIUM SERPL-MCNC: 9.7 MG/DL (ref 8.3–10.6)
CANNABINOIDS UR QL SCN>50 NG/ML: ABNORMAL
CHLORIDE SERPL-SCNC: 100 MMOL/L (ref 99–110)
CHOLEST SERPL-MCNC: 257 MG/DL (ref 0–199)
CO2 SERPL-SCNC: 23 MMOL/L (ref 21–32)
COCAINE UR QL SCN: ABNORMAL
CREAT SERPL-MCNC: 0.6 MG/DL (ref 0.6–1.1)
CREAT UR-MCNC: 76.5 MG/DL (ref 28–259)
DRUG SCREEN COMMENT UR-IMP: ABNORMAL
FENTANYL SCREEN, URINE: ABNORMAL
GFR SERPLBLD CREATININE-BSD FMLA CKD-EPI: >90 ML/MIN/{1.73_M2}
GLUCOSE SERPL-MCNC: 161 MG/DL (ref 70–99)
HDLC SERPL-MCNC: 60 MG/DL (ref 40–60)
LDLC SERPL CALC-MCNC: 171 MG/DL
METHADONE UR QL SCN>300 NG/ML: ABNORMAL
MICROALBUMIN UR DL<=1MG/L-MCNC: <1.2 MG/DL
MICROALBUMIN/CREAT UR: NORMAL MG/G (ref 0–30)
OPIATES UR QL SCN>300 NG/ML: ABNORMAL
OXYCODONE UR QL SCN: ABNORMAL
PCP UR QL SCN>25 NG/ML: ABNORMAL
PH UR STRIP: 6 [PH]
POTASSIUM SERPL-SCNC: 3.9 MMOL/L (ref 3.5–5.1)
PROT SERPL-MCNC: 7.3 G/DL (ref 6.4–8.2)
SODIUM SERPL-SCNC: 136 MMOL/L (ref 136–145)
TRIGL SERPL-MCNC: 128 MG/DL (ref 0–150)
VLDLC SERPL CALC-MCNC: 26 MG/DL

## 2024-11-07 PROCEDURE — 83036 HEMOGLOBIN GLYCOSYLATED A1C: CPT

## 2024-11-07 PROCEDURE — 80061 LIPID PANEL: CPT

## 2024-11-07 PROCEDURE — 80307 DRUG TEST PRSMV CHEM ANLYZR: CPT

## 2024-11-07 PROCEDURE — 82043 UR ALBUMIN QUANTITATIVE: CPT

## 2024-11-07 PROCEDURE — 80053 COMPREHEN METABOLIC PANEL: CPT

## 2024-11-07 PROCEDURE — 36415 COLL VENOUS BLD VENIPUNCTURE: CPT

## 2024-11-07 PROCEDURE — 82570 ASSAY OF URINE CREATININE: CPT

## 2024-11-08 LAB
EST. AVERAGE GLUCOSE BLD GHB EST-MCNC: 171.4 MG/DL
HBA1C MFR BLD: 7.6 %

## 2024-11-08 RX ORDER — ATORVASTATIN CALCIUM 20 MG/1
20 TABLET, FILM COATED ORAL DAILY
Qty: 90 TABLET | Refills: 1 | Status: SHIPPED | OUTPATIENT
Start: 2024-11-08

## 2024-12-05 NOTE — ASSESSMENT & PLAN NOTE
Patient has history of elevated TPO antibody with family history of Hashimoto's thyroiditis. She had been biochemically euthyroid in the past.  Most recently, she had been reporting raspy voice. Thyroid exam appears to be benign though we will update thyroid ultrasound. We will also update TSH and free T4. Discussed with patient that the risk of development hypothyroidism in the setting of elevated TPO antibody is around 2 to 5 %/year therefore would recommend to screen for thyroid dysfunction with TSH with reflex free T4 on an annual basis. If ultrasound is benign then might be a reasonable thing to take a course of PPI for few months and reassess voice changes. Patient reports understanding. Pt discharged home with family via POV. No significant detrimental changes prior to discharge. Neuro/skin/respiratory grossly WDL. Belongings with pt/family.      Nicolas Howe RN  12/05/24 [phone]

## 2024-12-10 DIAGNOSIS — F41.1 GAD (GENERALIZED ANXIETY DISORDER): ICD-10-CM

## 2024-12-10 RX ORDER — IBUPROFEN 800 MG/1
TABLET, FILM COATED ORAL
Qty: 90 TABLET | Refills: 0 | Status: SHIPPED | OUTPATIENT
Start: 2024-12-10

## 2024-12-10 RX ORDER — ALPRAZOLAM 1 MG/1
1-1.5 TABLET ORAL NIGHTLY PRN
Qty: 45 TABLET | Refills: 2 | Status: SHIPPED | OUTPATIENT
Start: 2024-12-10 | End: 2025-03-10

## 2024-12-10 NOTE — TELEPHONE ENCOUNTER
Refill request for ibuprofen (ADVIL;MOTRIN) 800 MG tablet   ALPRAZolam (XANAX) 1 MG tablet  medication.     Name of Pharmacy- [facility]      Last visit - 11/4/24     Pending visit - 5/6/25    Last refill -4/29/24,8/8/24      Medication Contract signed -   Last Oarrs ran-         Additional Comments

## 2024-12-18 DIAGNOSIS — R60.9 EDEMA, UNSPECIFIED TYPE: ICD-10-CM

## 2024-12-18 DIAGNOSIS — I10 ESSENTIAL HYPERTENSION: ICD-10-CM

## 2024-12-18 RX ORDER — HYDROCHLOROTHIAZIDE 25 MG/1
25 TABLET ORAL DAILY
Qty: 90 TABLET | Refills: 1 | Status: SHIPPED | OUTPATIENT
Start: 2024-12-18

## 2024-12-18 RX ORDER — HYDROCHLOROTHIAZIDE 25 MG/1
25 TABLET ORAL DAILY
Qty: 90 TABLET | Refills: 1 | OUTPATIENT
Start: 2024-12-18

## 2024-12-18 NOTE — TELEPHONE ENCOUNTER
Refill request for HCTZ medication.     Name of Pharmacy- [facility]      Last visit - 11/4/24     Pending visit - 5/6/25    Last refill -5/6/24      Medication Contract signed -   Last Oarrs ran-         Additional Comments

## 2024-12-20 RX ORDER — METFORMIN HYDROCHLORIDE 500 MG/1
2000 TABLET, EXTENDED RELEASE ORAL
Qty: 360 TABLET | Refills: 1 | Status: SHIPPED | OUTPATIENT
Start: 2024-12-20

## 2024-12-20 NOTE — TELEPHONE ENCOUNTER
Refill request for metFORMIN (GLUCOPHAGE-XR) 500 MG extended release tablet  medication.     Name of Pharmacy- [facility]      Last visit - 11/4/24     Pending visit - 5/6/25    Last refill -9/18/24      Medication Contract signed -   Last Oarrs ran-         Additional Comments

## 2024-12-23 RX ORDER — CYANOCOBALAMIN 1000 UG/ML
INJECTION, SOLUTION INTRAMUSCULAR; SUBCUTANEOUS
Qty: 3 ML | Refills: 3 | Status: CANCELLED | OUTPATIENT
Start: 2024-12-23

## 2024-12-24 ENCOUNTER — PATIENT MESSAGE (OUTPATIENT)
Dept: INTERNAL MEDICINE CLINIC | Age: 54
End: 2024-12-24

## 2024-12-26 RX ORDER — CYANOCOBALAMIN 1000 UG/ML
INJECTION, SOLUTION INTRAMUSCULAR; SUBCUTANEOUS
Qty: 3 ML | Refills: 3 | Status: SHIPPED | OUTPATIENT
Start: 2024-12-26

## 2024-12-26 RX ORDER — CYANOCOBALAMIN 1000 UG/ML
1000 INJECTION, SOLUTION INTRAMUSCULAR; SUBCUTANEOUS ONCE
Status: CANCELLED | OUTPATIENT
Start: 2024-12-26 | End: 2024-12-26

## 2024-12-26 RX ORDER — CYANOCOBALAMIN 1000 UG/ML
INJECTION, SOLUTION INTRAMUSCULAR; SUBCUTANEOUS
Qty: 3 ML | Refills: 3 | OUTPATIENT
Start: 2024-12-26

## 2024-12-26 NOTE — TELEPHONE ENCOUNTER
Refill request for cyanocobalamin 1000 MCG/ML injection  medication.     Name of Pharmacy- [facility]      Last visit - [id number]     Pending visit - non3    Last refill -[id number]      Medication Contract signed -  Last Oarrs ran-         Additional Comments

## 2025-01-02 ENCOUNTER — TELEPHONE (OUTPATIENT)
Dept: INTERNAL MEDICINE CLINIC | Age: 55
End: 2025-01-02

## 2025-01-02 ENCOUNTER — E-VISIT (OUTPATIENT)
Dept: INTERNAL MEDICINE CLINIC | Age: 55
End: 2025-01-02
Payer: COMMERCIAL

## 2025-01-02 DIAGNOSIS — R05.1 ACUTE COUGH: Primary | ICD-10-CM

## 2025-01-02 DIAGNOSIS — J01.90 ACUTE NON-RECURRENT SINUSITIS, UNSPECIFIED LOCATION: ICD-10-CM

## 2025-01-02 PROCEDURE — 99421 OL DIG E/M SVC 5-10 MIN: CPT | Performed by: NURSE PRACTITIONER

## 2025-01-02 RX ORDER — PREDNISONE 20 MG/1
40 TABLET ORAL DAILY
Qty: 8 TABLET | Refills: 0 | Status: SHIPPED | OUTPATIENT
Start: 2025-01-02 | End: 2025-01-06

## 2025-01-02 ASSESSMENT — LIFESTYLE VARIABLES: SMOKING_STATUS: NO, I'VE NEVER SMOKED

## 2025-01-02 NOTE — TELEPHONE ENCOUNTER
Patient is calling because she is in [place]. She is requesting an antibiotic and a steroid.    Patient states she gets them all the time when she travels.     Ear pain and clogged  Fluid in her ears  Eyes hurt,  Sinus pressure  Possible fever with chills  Sneezing  Runny nose    She has tried ear drops allergy medication no relief.     Please advise      ([facility] in [place], lisa wisdom)

## 2025-03-05 RX ORDER — SEMAGLUTIDE 2.68 MG/ML
INJECTION, SOLUTION SUBCUTANEOUS
Qty: 3 ML | Refills: 5 | Status: SHIPPED | OUTPATIENT
Start: 2025-03-05

## 2025-03-05 NOTE — TELEPHONE ENCOUNTER
Refill request for OZEMPIC medication.     Name of Pharmacy- [facility]      Last visit - 1/2/25     Pending visit - 5/6/25    Last refill -2/9/25      Medication Contract signed -   Last Oarrs ran-         Additional Comments

## 2025-03-10 RX ORDER — SERTRALINE HYDROCHLORIDE 25 MG/1
25 TABLET, FILM COATED ORAL DAILY
Qty: 90 TABLET | Refills: 3 | Status: SHIPPED | OUTPATIENT
Start: 2025-03-10

## 2025-04-15 ENCOUNTER — PATIENT MESSAGE (OUTPATIENT)
Dept: INTERNAL MEDICINE CLINIC | Age: 55
End: 2025-04-15

## 2025-04-15 DIAGNOSIS — G47.00 INSOMNIA, UNSPECIFIED TYPE: Primary | ICD-10-CM

## 2025-04-15 DIAGNOSIS — F41.1 GAD (GENERALIZED ANXIETY DISORDER): ICD-10-CM

## 2025-04-15 RX ORDER — IBUPROFEN 800 MG/1
800 TABLET, FILM COATED ORAL EVERY 8 HOURS PRN
Qty: 90 TABLET | Refills: 2 | Status: SHIPPED | OUTPATIENT
Start: 2025-04-15

## 2025-04-15 NOTE — TELEPHONE ENCOUNTER
Refill request for ALPRAZolam (XANAX) 1 MG  medication.     Name of Pharmacy- [facility]      Last visit - [id number]     Pending visit - [id number]    Last refill -[id number]      Medication Contract signed -  Last Oarrs ran-     PDMP Monitoring:    Last PDMP Ángel as Reviewed:  Review User Review Instant Review Result   KRISTOFER DOMINGUEZ 12/10/2024  1:00 PM Reviewed PDMP [1]     [unfilled]  Urine Drug Screenings (1 yr)       DRUG SCREEN MULTI URINE  Collected: 11/7/2024  9:09 AM (Final result)              Drug screen multi urine  Collected: 11/30/2014  6:15 PM (Final result)                  Medication Contract and Consent for Opioid Use Documents Filed       Patient Documents       Type of Document Status Date Received Received By Description    Medication Contract [Status Missing]  ASHUTOSH KAUR Medication Agreement 6/18/15                      Additional Comments

## 2025-04-15 NOTE — TELEPHONE ENCOUNTER
Refill request for Advil medication.     Name of Pharmacy- [facility]      Last visit - 11/4/24     Pending visit - 5/6/25    Last refill -12/10/24

## 2025-04-15 NOTE — TELEPHONE ENCOUNTER
Refill request for Xanax medication.     Name of Pharmacy- [facility]      Last visit - 11/4/24     Pending visit - 5/6/25    Last refill -12/10/24

## 2025-04-16 RX ORDER — ALPRAZOLAM 1 MG/1
1-1.5 TABLET ORAL NIGHTLY PRN
Qty: 45 TABLET | Refills: 2 | OUTPATIENT
Start: 2025-04-16 | End: 2025-07-15

## 2025-04-16 RX ORDER — ALPRAZOLAM 1 MG/1
1 TABLET ORAL NIGHTLY PRN
Qty: 30 TABLET | Refills: 2 | Status: SHIPPED | OUTPATIENT
Start: 2025-04-16 | End: 2025-07-15

## 2025-05-02 DIAGNOSIS — F41.1 GAD (GENERALIZED ANXIETY DISORDER): ICD-10-CM

## 2025-05-02 RX ORDER — ALPRAZOLAM 1 MG/1
1-1.5 TABLET ORAL NIGHTLY PRN
Qty: 45 TABLET | Refills: 2 | Status: CANCELLED | OUTPATIENT
Start: 2025-05-02 | End: 2025-07-31

## 2025-05-05 ASSESSMENT — PATIENT HEALTH QUESTIONNAIRE - PHQ9
SUM OF ALL RESPONSES TO PHQ QUESTIONS 1-9: 2
9. THOUGHTS THAT YOU WOULD BE BETTER OFF DEAD, OR OF HURTING YOURSELF: NOT AT ALL
4. FEELING TIRED OR HAVING LITTLE ENERGY: SEVERAL DAYS
10. IF YOU CHECKED OFF ANY PROBLEMS, HOW DIFFICULT HAVE THESE PROBLEMS MADE IT FOR YOU TO DO YOUR WORK, TAKE CARE OF THINGS AT HOME, OR GET ALONG WITH OTHER PEOPLE: NOT DIFFICULT AT ALL
3. TROUBLE FALLING OR STAYING ASLEEP: SEVERAL DAYS
7. TROUBLE CONCENTRATING ON THINGS, SUCH AS READING THE NEWSPAPER OR WATCHING TELEVISION: NOT AT ALL
7. TROUBLE CONCENTRATING ON THINGS, SUCH AS READING THE NEWSPAPER OR WATCHING TELEVISION: NOT AT ALL
9. THOUGHTS THAT YOU WOULD BE BETTER OFF DEAD, OR OF HURTING YOURSELF: NOT AT ALL
SUM OF ALL RESPONSES TO PHQ QUESTIONS 1-9: 2
1. LITTLE INTEREST OR PLEASURE IN DOING THINGS: NOT AT ALL
6. FEELING BAD ABOUT YOURSELF - OR THAT YOU ARE A FAILURE OR HAVE LET YOURSELF OR YOUR FAMILY DOWN: NOT AT ALL
3. TROUBLE FALLING OR STAYING ASLEEP: SEVERAL DAYS
SUM OF ALL RESPONSES TO PHQ QUESTIONS 1-9: 2
5. POOR APPETITE OR OVEREATING: NOT AT ALL
5. POOR APPETITE OR OVEREATING: NOT AT ALL
6. FEELING BAD ABOUT YOURSELF - OR THAT YOU ARE A FAILURE OR HAVE LET YOURSELF OR YOUR FAMILY DOWN: NOT AT ALL
8. MOVING OR SPEAKING SO SLOWLY THAT OTHER PEOPLE COULD HAVE NOTICED. OR THE OPPOSITE, BEING SO FIGETY OR RESTLESS THAT YOU HAVE BEEN MOVING AROUND A LOT MORE THAN USUAL: NOT AT ALL
2. FEELING DOWN, DEPRESSED OR HOPELESS: NOT AT ALL
8. MOVING OR SPEAKING SO SLOWLY THAT OTHER PEOPLE COULD HAVE NOTICED. OR THE OPPOSITE - BEING SO FIDGETY OR RESTLESS THAT YOU HAVE BEEN MOVING AROUND A LOT MORE THAN USUAL: NOT AT ALL
SUM OF ALL RESPONSES TO PHQ QUESTIONS 1-9: 2
1. LITTLE INTEREST OR PLEASURE IN DOING THINGS: NOT AT ALL
SUM OF ALL RESPONSES TO PHQ QUESTIONS 1-9: 2
10. IF YOU CHECKED OFF ANY PROBLEMS, HOW DIFFICULT HAVE THESE PROBLEMS MADE IT FOR YOU TO DO YOUR WORK, TAKE CARE OF THINGS AT HOME, OR GET ALONG WITH OTHER PEOPLE: NOT DIFFICULT AT ALL
2. FEELING DOWN, DEPRESSED OR HOPELESS: NOT AT ALL
4. FEELING TIRED OR HAVING LITTLE ENERGY: SEVERAL DAYS

## 2025-05-06 ENCOUNTER — OFFICE VISIT (OUTPATIENT)
Dept: INTERNAL MEDICINE CLINIC | Age: 55
End: 2025-05-06
Payer: COMMERCIAL

## 2025-05-06 VITALS
TEMPERATURE: 97.3 F | BODY MASS INDEX: 35.3 KG/M2 | SYSTOLIC BLOOD PRESSURE: 132 MMHG | HEIGHT: 63 IN | DIASTOLIC BLOOD PRESSURE: 82 MMHG | HEART RATE: 81 BPM | WEIGHT: 199.2 LBS | OXYGEN SATURATION: 98 %

## 2025-05-06 DIAGNOSIS — Z12.11 SCREENING FOR COLON CANCER: ICD-10-CM

## 2025-05-06 DIAGNOSIS — R35.0 URINARY FREQUENCY: Primary | ICD-10-CM

## 2025-05-06 DIAGNOSIS — F42.9 OBSESSIVE-COMPULSIVE DISORDER, UNSPECIFIED TYPE: ICD-10-CM

## 2025-05-06 DIAGNOSIS — F32.A DEPRESSION, UNSPECIFIED DEPRESSION TYPE: ICD-10-CM

## 2025-05-06 DIAGNOSIS — G47.00 INSOMNIA, UNSPECIFIED TYPE: ICD-10-CM

## 2025-05-06 DIAGNOSIS — F41.1 GAD (GENERALIZED ANXIETY DISORDER): ICD-10-CM

## 2025-05-06 DIAGNOSIS — E78.2 MIXED HYPERLIPIDEMIA: ICD-10-CM

## 2025-05-06 DIAGNOSIS — Z00.00 WELL ADULT EXAM: ICD-10-CM

## 2025-05-06 DIAGNOSIS — E11.65 TYPE 2 DIABETES MELLITUS WITH HYPERGLYCEMIA, WITHOUT LONG-TERM CURRENT USE OF INSULIN (HCC): ICD-10-CM

## 2025-05-06 DIAGNOSIS — E06.3 HASHIMOTO'S THYROIDITIS: ICD-10-CM

## 2025-05-06 DIAGNOSIS — I10 ESSENTIAL HYPERTENSION: ICD-10-CM

## 2025-05-06 LAB
BILIRUBIN, POC: NORMAL
BLOOD URINE, POC: NORMAL
CLARITY, POC: NORMAL
COLOR, POC: YELLOW
GLUCOSE URINE, POC: NORMAL MG/DL
KETONES, POC: NORMAL MG/DL
LEUKOCYTE EST, POC: NORMAL
NITRITE, POC: POSITIVE
PH, POC: 6
PROTEIN, POC: NORMAL MG/DL
SPECIFIC GRAVITY, POC: 1.02
UROBILINOGEN, POC: 0.2 MG/DL

## 2025-05-06 PROCEDURE — 81002 URINALYSIS NONAUTO W/O SCOPE: CPT | Performed by: NURSE PRACTITIONER

## 2025-05-06 PROCEDURE — 3079F DIAST BP 80-89 MM HG: CPT | Performed by: NURSE PRACTITIONER

## 2025-05-06 PROCEDURE — 99214 OFFICE O/P EST MOD 30 MIN: CPT | Performed by: NURSE PRACTITIONER

## 2025-05-06 PROCEDURE — 3075F SYST BP GE 130 - 139MM HG: CPT | Performed by: NURSE PRACTITIONER

## 2025-05-06 RX ORDER — ALPRAZOLAM 1 MG/1
1-1.5 TABLET ORAL NIGHTLY PRN
Qty: 45 TABLET | Refills: 2 | Status: SHIPPED | OUTPATIENT
Start: 2025-05-06 | End: 2025-08-04

## 2025-05-06 SDOH — ECONOMIC STABILITY: FOOD INSECURITY: WITHIN THE PAST 12 MONTHS, YOU WORRIED THAT YOUR FOOD WOULD RUN OUT BEFORE YOU GOT MONEY TO BUY MORE.: NEVER TRUE

## 2025-05-06 SDOH — ECONOMIC STABILITY: FOOD INSECURITY: WITHIN THE PAST 12 MONTHS, THE FOOD YOU BOUGHT JUST DIDN'T LAST AND YOU DIDN'T HAVE MONEY TO GET MORE.: NEVER TRUE

## 2025-05-06 ASSESSMENT — ENCOUNTER SYMPTOMS
SHORTNESS OF BREATH: 0
NAUSEA: 0
CONSTIPATION: 0
DIARRHEA: 0
ABDOMINAL DISTENTION: 0
CHEST TIGHTNESS: 0
VOMITING: 0

## 2025-05-06 NOTE — PROGRESS NOTES
Associated Diagnoses:  --     Multiple Vitamin (MULTIVITAMIN PO)  --  --     Associated Diagnoses:  --     pantoprazole (PROTONIX) 40 MG tablet  10/11/23  --     TAKE 1 TABLET BY MOUTH EVERY DAY BEFORE BREAKFAST     Associated Diagnoses:  --     semaglutide, 2 MG/DOSE, (OZEMPIC, 2 MG/DOSE,) 8 MG/3ML SOPN sc injection  03/05/25  --     INJECT 2 MG INTO THE SKIN EVERY 7 DAYS     Associated Diagnoses:  --     sertraline (ZOLOFT) 25 MG tablet  03/10/25  --     TAKE 1 TABLET BY MOUTH DAILY (IN ADDITION TO A 50 MG PILL DAILY)     Associated Diagnoses:  --     sertraline (ZOLOFT) 25 MG tablet  03/10/25  --     Take 1 tablet by mouth daily (in addition to a 50 mg pill daily)     Patient not taking: Reported on 5/6/2025     Associated Diagnoses:  --     sertraline (ZOLOFT) 50 MG tablet  01/17/25  --     Take 1 tablet by mouth daily     Associated Diagnoses:  --             Patient Active Problem List   Diagnosis Code    AMANDA (generalized anxiety disorder) F41.1    Insomnia G47.00    Hypertension, Essential I10    PUD (peptic ulcer disease) K27.9    Obesity E66.9    Hyperlipidemia, Mixed E78.2    Type 2 diabetes mellitus, without long-term current use of insulin (HCC) E11.9    B12 deficiency E53.8    Depression F32.A    OCD (obsessive compulsive disorder) F42.9    Hashimoto's thyroiditis E06.3    Hoarseness of voice R49.0        Review of Systems - negative except as listed above in the HPI    Objective   Blood pressure 132/82, pulse 81, temperature 97.3 °F (36.3 °C), temperature source Temporal, height 1.6 m (5' 2.99\"), weight 90.4 kg (199 lb 3.2 oz), last menstrual period 01/06/2005, SpO2 98%, not currently breastfeeding.  Physical Exam  Constitutional:       Appearance: Normal appearance. She is well-developed.   HENT:      Head: Normocephalic.      Right Ear: Hearing and external ear normal.      Left Ear: Hearing and external ear normal.      Nose: Nose normal.   Eyes:      General: Lids are normal. Lids are everted, no

## 2025-05-07 ENCOUNTER — PATIENT MESSAGE (OUTPATIENT)
Dept: INTERNAL MEDICINE CLINIC | Age: 55
End: 2025-05-07

## 2025-05-07 RX ORDER — FLUCONAZOLE 150 MG/1
150 TABLET ORAL ONCE
Qty: 1 TABLET | Refills: 0 | Status: SHIPPED | OUTPATIENT
Start: 2025-05-07 | End: 2025-05-07

## 2025-05-07 NOTE — TELEPHONE ENCOUNTER
Additional Comments PENDED      Refill request for  medication.     DIFLUCAN 150 MG     Name of Pharmacy- [facility]      Last visit - 05/06/2025     Pending visit - 11/06/2025    Last refill -NEW REQUEST               Additional Comments

## 2025-05-08 ENCOUNTER — RESULTS FOLLOW-UP (OUTPATIENT)
Dept: INTERNAL MEDICINE CLINIC | Age: 55
End: 2025-05-08

## 2025-05-09 LAB
BACTERIA UR CULT: ABNORMAL
ORGANISM: ABNORMAL

## 2025-05-12 RX ORDER — CIPROFLOXACIN 250 MG/1
250 TABLET, FILM COATED ORAL 2 TIMES DAILY
Qty: 10 TABLET | Refills: 0 | Status: SHIPPED | OUTPATIENT
Start: 2025-05-12 | End: 2025-05-17

## 2025-06-05 DIAGNOSIS — R60.9 EDEMA, UNSPECIFIED TYPE: ICD-10-CM

## 2025-06-05 DIAGNOSIS — I10 ESSENTIAL HYPERTENSION: ICD-10-CM

## 2025-06-05 RX ORDER — HYDROCHLOROTHIAZIDE 25 MG/1
25 TABLET ORAL DAILY
Qty: 90 TABLET | Refills: 1 | OUTPATIENT
Start: 2025-06-05

## 2025-06-05 RX ORDER — HYDROCHLOROTHIAZIDE 25 MG/1
25 TABLET ORAL DAILY
Qty: 90 TABLET | Refills: 1 | Status: SHIPPED | OUTPATIENT
Start: 2025-06-05

## 2025-06-05 NOTE — TELEPHONE ENCOUNTER
Refill request for HCTZ medication.     Name of Pharmacy- [facility]      Last visit - 5/6/25     Pending visit - 11/6/25    Last refill -12/18/24      Medication Contract signed -   Last Oarrs ran-         Additional Comments

## 2025-06-06 RX ORDER — CIPROFLOXACIN 250 MG/1
250 TABLET, FILM COATED ORAL 2 TIMES DAILY
Qty: 14 TABLET | Refills: 0 | Status: SHIPPED | OUTPATIENT
Start: 2025-06-06 | End: 2025-06-13

## 2025-07-09 RX ORDER — METFORMIN HYDROCHLORIDE 500 MG/1
2000 TABLET, EXTENDED RELEASE ORAL
Qty: 360 TABLET | Refills: 1 | Status: SHIPPED | OUTPATIENT
Start: 2025-07-09

## 2025-07-09 NOTE — TELEPHONE ENCOUNTER
Refill request for METFORMIN medication.     Name of Pharmacy- [facility]      Last visit - 5/6/25     Pending visit - 11/6/25    Last refill -4/14/25      Medication Contract signed -   Last Oarrs ran-         Additional Comments

## 2025-07-14 ENCOUNTER — HOSPITAL ENCOUNTER (EMERGENCY)
Age: 55
Discharge: HOME | End: 2025-07-14
Attending: EMERGENCY MEDICINE
Payer: COMMERCIAL

## 2025-07-14 VITALS
HEIGHT: 63 IN | RESPIRATION RATE: 18 BRPM | HEART RATE: 75 BPM | DIASTOLIC BLOOD PRESSURE: 83 MMHG | BODY MASS INDEX: 34.73 KG/M2 | WEIGHT: 196 LBS | SYSTOLIC BLOOD PRESSURE: 137 MMHG | TEMPERATURE: 98.3 F | OXYGEN SATURATION: 100 %

## 2025-07-14 DIAGNOSIS — L02.91 ABSCESS: Primary | ICD-10-CM

## 2025-07-14 DIAGNOSIS — F41.1 GAD (GENERALIZED ANXIETY DISORDER): ICD-10-CM

## 2025-07-14 PROCEDURE — 99283 EMERGENCY DEPT VISIT LOW MDM: CPT

## 2025-07-14 PROCEDURE — 6370000000 HC RX 637 (ALT 250 FOR IP): Performed by: EMERGENCY MEDICINE

## 2025-07-14 RX ORDER — FLUCONAZOLE 100 MG/1
200 TABLET ORAL ONCE
Status: COMPLETED | OUTPATIENT
Start: 2025-07-14 | End: 2025-07-14

## 2025-07-14 RX ORDER — CEPHALEXIN 500 MG/1
500 CAPSULE ORAL 3 TIMES DAILY
Qty: 30 CAPSULE | Refills: 0 | Status: SHIPPED | OUTPATIENT
Start: 2025-07-14 | End: 2025-07-24

## 2025-07-14 RX ORDER — OXYCODONE AND ACETAMINOPHEN 5; 325 MG/1; MG/1
1 TABLET ORAL ONCE
Refills: 0 | Status: COMPLETED | OUTPATIENT
Start: 2025-07-14 | End: 2025-07-14

## 2025-07-14 RX ORDER — DOXYCYCLINE HYCLATE 100 MG
100 TABLET ORAL 2 TIMES DAILY
Qty: 20 TABLET | Refills: 0 | Status: SHIPPED | OUTPATIENT
Start: 2025-07-14 | End: 2025-07-24

## 2025-07-14 RX ORDER — DOXYCYCLINE HYCLATE 100 MG
100 TABLET ORAL ONCE
Status: COMPLETED | OUTPATIENT
Start: 2025-07-14 | End: 2025-07-14

## 2025-07-14 RX ORDER — FLUCONAZOLE 100 MG/1
100 TABLET ORAL DAILY
Qty: 3 TABLET | Refills: 0 | Status: SHIPPED | OUTPATIENT
Start: 2025-07-14 | End: 2025-07-17

## 2025-07-14 RX ORDER — OXYCODONE AND ACETAMINOPHEN 5; 325 MG/1; MG/1
1 TABLET ORAL EVERY 6 HOURS PRN
Qty: 12 TABLET | Refills: 0 | Status: SHIPPED | OUTPATIENT
Start: 2025-07-14 | End: 2025-07-17

## 2025-07-14 RX ADMIN — FLUCONAZOLE 200 MG: 100 TABLET ORAL at 08:58

## 2025-07-14 RX ADMIN — CEPHALEXIN 500 MG: 250 CAPSULE ORAL at 08:58

## 2025-07-14 RX ADMIN — OXYCODONE AND ACETAMINOPHEN 1 TABLET: 5; 325 TABLET ORAL at 08:58

## 2025-07-14 RX ADMIN — DOXYCYCLINE HYCLATE 100 MG: 100 TABLET, COATED ORAL at 08:58

## 2025-07-14 ASSESSMENT — PAIN DESCRIPTION - PAIN TYPE: TYPE: ACUTE PAIN

## 2025-07-14 ASSESSMENT — PAIN SCALES - GENERAL: PAINLEVEL_OUTOF10: 7

## 2025-07-14 ASSESSMENT — PAIN DESCRIPTION - ORIENTATION: ORIENTATION: LEFT

## 2025-07-14 ASSESSMENT — LIFESTYLE VARIABLES
HOW MANY STANDARD DRINKS CONTAINING ALCOHOL DO YOU HAVE ON A TYPICAL DAY: 3 OR 4
HOW OFTEN DO YOU HAVE A DRINK CONTAINING ALCOHOL: 2-4 TIMES A MONTH

## 2025-07-14 ASSESSMENT — PAIN DESCRIPTION - LOCATION: LOCATION: ARM

## 2025-07-14 ASSESSMENT — PAIN - FUNCTIONAL ASSESSMENT: PAIN_FUNCTIONAL_ASSESSMENT: 0-10

## 2025-07-14 NOTE — ED PROVIDER NOTES
Emergency Department Attending Physician Note  Location: [facility] EMERGENCY DEPARTMENT  7/14/2025       Pt Name: Eugenia De Leon  MRN: [id number]  Birthdate 1970    Date of evaluation: 7/14/2025  Provider: BLACK WARE DO  PCP: Kenneth Kim APRN - CNP    Note Started: 8:40 AM EDT 7/14/25    CHIEF COMPLAINT  Chief Complaint   Patient presents with    Arm Pain     Patient got back from [facility] on 7/8. Patient noticed two lumps under left arm pit.  One of them drained but the other lump has not. Patient does report the lumps being very painful and radiating up arm.   Recent IV infiltration on 7/9.        ROOM: 5    HISTORY OF PRESENT ILLNESS:  History obtained by patient. Limitations to history : None.     Eugenia De Leon is a 54 y.o. female with a significant PMHx of depression, diabetes, and other comorbidities listed below, here in the emergency department today with swelling, pain, under the left arm.  She said there was a small area of draining of brown fluid from her armpit, but that stopped, now it is getting bigger.  She says the pain is starting to become worse, prompting her to come to the ER.  Says she think she needs antibiotics.  Was at [facility], but had no injuries, denies any other wounds or abscesses.  Denies any falls or injuries.  No fevers or chills      Nursing Notes were all reviewed and agreed with or any disagreements were addressed in the HPI.      MEDICAL HISTORY  Past Medical History:   Diagnosis Date    Anxiety     Autoimmune hepatitis (HCC) 11/30/2014    ER/admission 11/30/14      B12 deficiency     Cholelithiasis 11/30/2014    Depression 09/12/2013    Diabetes mellitus type 2, uncontrolled 05/09/2012    Edema 05/09/2012    Edema of hand     Edema of lower extremity     Fatigue 11/30/2014    Goiter 10/02/2019    H/O total hysterectomy 02/05/2018    Hepatitis     Hyperglycemia     Hyperlipidemia     Hypertension     Insomnia     Leg edema

## 2025-07-15 RX ORDER — IBUPROFEN 800 MG/1
800 TABLET, FILM COATED ORAL EVERY 8 HOURS PRN
Qty: 90 TABLET | Refills: 1 | Status: SHIPPED | OUTPATIENT
Start: 2025-07-15

## 2025-07-15 NOTE — TELEPHONE ENCOUNTER
Refill request for IBUPROGEN medication.     Name of Pharmacy- [facility]      Last visit - 5/6/25     Pending visit - 11/6/25    Last refill -6/15/25      Medication Contract signed -   Last Oarrs ran-         Additional Comments

## 2025-07-16 ASSESSMENT — ENCOUNTER SYMPTOMS
BACK PAIN: 0
CHEST TIGHTNESS: 0
ABDOMINAL PAIN: 0
SHORTNESS OF BREATH: 0
DIARRHEA: 0
VOMITING: 0
COUGH: 0
RHINORRHEA: 0

## 2025-08-12 RX ORDER — NITROFURANTOIN 25; 75 MG/1; MG/1
100 CAPSULE ORAL 2 TIMES DAILY
Qty: 10 CAPSULE | Refills: 0 | Status: SHIPPED | OUTPATIENT
Start: 2025-08-12 | End: 2025-08-17

## 2025-08-19 ENCOUNTER — OFFICE VISIT (OUTPATIENT)
Dept: INTERNAL MEDICINE CLINIC | Age: 55
End: 2025-08-19
Payer: COMMERCIAL

## 2025-08-19 DIAGNOSIS — Z00.00 WELL ADULT EXAM: ICD-10-CM

## 2025-08-19 DIAGNOSIS — E78.2 MIXED HYPERLIPIDEMIA: ICD-10-CM

## 2025-08-19 DIAGNOSIS — E06.3 HASHIMOTO'S THYROIDITIS: ICD-10-CM

## 2025-08-19 DIAGNOSIS — R00.2 PALPITATIONS: Primary | ICD-10-CM

## 2025-08-19 DIAGNOSIS — I10 ESSENTIAL HYPERTENSION: ICD-10-CM

## 2025-08-19 DIAGNOSIS — E11.65 TYPE 2 DIABETES MELLITUS WITH HYPERGLYCEMIA, WITHOUT LONG-TERM CURRENT USE OF INSULIN (HCC): ICD-10-CM

## 2025-08-19 LAB
BASOPHILS # BLD: 0 K/UL (ref 0–0.2)
BASOPHILS NFR BLD: 0.5 %
DEPRECATED RDW RBC AUTO: 13.3 % (ref 12.4–15.4)
EOSINOPHIL # BLD: 0.1 K/UL (ref 0–0.6)
EOSINOPHIL NFR BLD: 1.4 %
HCT VFR BLD AUTO: 40.9 % (ref 36–48)
HGB BLD-MCNC: 14 G/DL (ref 12–16)
LYMPHOCYTES # BLD: 1.8 K/UL (ref 1–5.1)
LYMPHOCYTES NFR BLD: 28.8 %
MCH RBC QN AUTO: 30.1 PG (ref 26–34)
MCHC RBC AUTO-ENTMCNC: 34.2 G/DL (ref 31–36)
MCV RBC AUTO: 88 FL (ref 80–100)
MONOCYTES # BLD: 0.5 K/UL (ref 0–1.3)
MONOCYTES NFR BLD: 8.1 %
NEUTROPHILS # BLD: 3.9 K/UL (ref 1.7–7.7)
NEUTROPHILS NFR BLD: 61.2 %
PLATELET # BLD AUTO: 265 K/UL (ref 135–450)
PMV BLD AUTO: 8 FL (ref 5–10.5)
RBC # BLD AUTO: 4.65 M/UL (ref 4–5.2)
WBC # BLD AUTO: 6.4 K/UL (ref 4–11)

## 2025-08-19 PROCEDURE — 3077F SYST BP >= 140 MM HG: CPT | Performed by: NURSE PRACTITIONER

## 2025-08-19 PROCEDURE — 3079F DIAST BP 80-89 MM HG: CPT | Performed by: NURSE PRACTITIONER

## 2025-08-19 PROCEDURE — 93000 ELECTROCARDIOGRAM COMPLETE: CPT | Performed by: NURSE PRACTITIONER

## 2025-08-19 PROCEDURE — 99214 OFFICE O/P EST MOD 30 MIN: CPT | Performed by: NURSE PRACTITIONER

## 2025-08-19 ASSESSMENT — ENCOUNTER SYMPTOMS
WHEEZING: 0
CONSTIPATION: 0
CHEST TIGHTNESS: 0
ABDOMINAL DISTENTION: 0
SHORTNESS OF BREATH: 0
COUGH: 1
DIARRHEA: 0
VOMITING: 0
NAUSEA: 0

## 2025-08-20 ENCOUNTER — TELEPHONE (OUTPATIENT)
Dept: INTERNAL MEDICINE CLINIC | Age: 55
End: 2025-08-20

## 2025-08-20 VITALS
OXYGEN SATURATION: 98 % | DIASTOLIC BLOOD PRESSURE: 82 MMHG | HEART RATE: 68 BPM | SYSTOLIC BLOOD PRESSURE: 128 MMHG | WEIGHT: 199.8 LBS | TEMPERATURE: 96.3 F | BODY MASS INDEX: 35.39 KG/M2

## 2025-08-20 DIAGNOSIS — E11.65 TYPE 2 DIABETES MELLITUS WITH HYPERGLYCEMIA, WITHOUT LONG-TERM CURRENT USE OF INSULIN (HCC): Primary | ICD-10-CM

## 2025-08-20 LAB
25(OH)D3 SERPL-MCNC: 28 NG/ML
ALBUMIN SERPL-MCNC: 4.4 G/DL (ref 3.4–5)
ALBUMIN/GLOB SERPL: 1.8 {RATIO} (ref 1.1–2.2)
ALP SERPL-CCNC: 80 U/L (ref 40–129)
ALT SERPL-CCNC: 13 U/L (ref 10–40)
ANION GAP SERPL CALCULATED.3IONS-SCNC: 13 MMOL/L (ref 3–16)
AST SERPL-CCNC: 20 U/L (ref 15–37)
BILIRUB SERPL-MCNC: 0.4 MG/DL (ref 0–1)
BUN SERPL-MCNC: 13 MG/DL (ref 7–20)
CALCIUM SERPL-MCNC: 9.6 MG/DL (ref 8.3–10.6)
CHLORIDE SERPL-SCNC: 99 MMOL/L (ref 99–110)
CHOLEST SERPL-MCNC: 277 MG/DL (ref 0–199)
CO2 SERPL-SCNC: 27 MMOL/L (ref 21–32)
CREAT SERPL-MCNC: 0.7 MG/DL (ref 0.6–1.1)
EST. AVERAGE GLUCOSE BLD GHB EST-MCNC: 182.9 MG/DL
GFR SERPLBLD CREATININE-BSD FMLA CKD-EPI: >90 ML/MIN/{1.73_M2}
GLUCOSE SERPL-MCNC: 156 MG/DL (ref 70–99)
HBA1C MFR BLD: 8 %
HDLC SERPL-MCNC: 70 MG/DL (ref 40–60)
LDLC SERPL CALC-MCNC: 159 MG/DL
POTASSIUM SERPL-SCNC: 4.4 MMOL/L (ref 3.5–5.1)
PROT SERPL-MCNC: 6.8 G/DL (ref 6.4–8.2)
SODIUM SERPL-SCNC: 139 MMOL/L (ref 136–145)
TRIGL SERPL-MCNC: 239 MG/DL (ref 0–150)
TSH SERPL DL<=0.005 MIU/L-ACNC: 2.84 UIU/ML (ref 0.27–4.2)
VLDLC SERPL CALC-MCNC: 48 MG/DL

## 2025-08-25 RX ORDER — SEMAGLUTIDE 2.68 MG/ML
INJECTION, SOLUTION SUBCUTANEOUS
Qty: 3 ML | Refills: 5 | Status: SHIPPED | OUTPATIENT
Start: 2025-08-25

## 2025-08-26 ENCOUNTER — HOSPITAL ENCOUNTER (OUTPATIENT)
Dept: WOMENS IMAGING | Age: 55
Discharge: HOME | End: 2025-08-26
Payer: COMMERCIAL

## 2025-08-26 VITALS — WEIGHT: 185 LBS | BODY MASS INDEX: 32.78 KG/M2 | HEIGHT: 63 IN

## 2025-08-26 DIAGNOSIS — Z12.31 SCREENING MAMMOGRAM, ENCOUNTER FOR: ICD-10-CM

## 2025-08-26 PROCEDURE — 77063 BREAST TOMOSYNTHESIS BI: CPT
